# Patient Record
Sex: FEMALE | Race: BLACK OR AFRICAN AMERICAN | Employment: OTHER | URBAN - METROPOLITAN AREA
[De-identification: names, ages, dates, MRNs, and addresses within clinical notes are randomized per-mention and may not be internally consistent; named-entity substitution may affect disease eponyms.]

---

## 2017-01-17 ENCOUNTER — HOSPITAL ENCOUNTER (OUTPATIENT)
Dept: LAB | Age: 72
Discharge: HOME | End: 2017-01-17

## 2017-01-17 ENCOUNTER — LAB ONLY (OUTPATIENT)
Dept: INTERNAL MEDICINE CLINIC | Age: 72
End: 2017-01-17

## 2017-01-17 PROCEDURE — 99001 SPECIMEN HANDLING PT-LAB: CPT | Performed by: FAMILY MEDICINE

## 2017-01-25 ENCOUNTER — OFFICE VISIT (OUTPATIENT)
Dept: INTERNAL MEDICINE CLINIC | Age: 72
End: 2017-01-25

## 2017-01-25 VITALS
DIASTOLIC BLOOD PRESSURE: 81 MMHG | OXYGEN SATURATION: 98 % | TEMPERATURE: 96.7 F | BODY MASS INDEX: 25.94 KG/M2 | RESPIRATION RATE: 16 BRPM | WEIGHT: 146.4 LBS | HEART RATE: 63 BPM | HEIGHT: 63 IN | SYSTOLIC BLOOD PRESSURE: 124 MMHG

## 2017-01-25 DIAGNOSIS — E55.9 VITAMIN D DEFICIENCY: ICD-10-CM

## 2017-01-25 DIAGNOSIS — E66.3 OVERWEIGHT (BMI 25.0-29.9): ICD-10-CM

## 2017-01-25 DIAGNOSIS — E78.00 PURE HYPERCHOLESTEROLEMIA: ICD-10-CM

## 2017-01-25 DIAGNOSIS — K21.9 GASTROESOPHAGEAL REFLUX DISEASE WITHOUT ESOPHAGITIS: ICD-10-CM

## 2017-01-25 DIAGNOSIS — I10 ESSENTIAL HYPERTENSION, BENIGN: Primary | ICD-10-CM

## 2017-01-25 PROBLEM — Z90.710 ACQUIRED ABSENCE OF BOTH CERVIX AND UTERUS: Status: ACTIVE | Noted: 2017-01-25

## 2017-01-25 RX ORDER — HYDROCHLOROTHIAZIDE 25 MG/1
12.5 TABLET ORAL DAILY
Qty: 90 TAB | Refills: 1 | Status: SHIPPED | OUTPATIENT
Start: 2017-01-25 | End: 2017-04-26 | Stop reason: DRUGHIGH

## 2017-01-25 NOTE — MR AVS SNAPSHOT
Visit Information Date & Time Provider Department Dept. Phone Encounter #  
 1/25/2017 10:45 AM Willie Ormond, DO [facility] [employer] [phone] Follow-up Instructions Return for 3 month follow up w/ labs. Upcoming Health Maintenance Date Due DTaP/Tdap/Td series (1 - Tdap) 9/12/1968 Pneumococcal 65+ Low/Medium Risk (1 of 2 - PCV13) 9/12/2012 GLAUCOMA SCREENING Q2Y 3/3/2016 MEDICARE YEARLY EXAM 4/26/2017 BREAST CANCER SCRN MAMMOGRAM 5/20/2018 COLONOSCOPY 5/1/2025 Allergies as of 1/25/2017  Review Complete On: 1/25/2017 By: Alanna Chaves LPN Severity Noted Reaction Type Reactions Naproxen  04/06/2010    Palpitations Current Immunizations  Reviewed on 6/26/2014 No immunizations on file. Not reviewed this visit You Were Diagnosed With   
  
 Codes Comments Essential hypertension, benign    -  Primary ICD-10-CM: I10 
ICD-9-CM: 401.1 Gastroesophageal reflux disease without esophagitis     ICD-10-CM: K21.9 ICD-9-CM: 530.81 Overweight (BMI 25.0-29. 9)     ICD-10-CM: X98.6 ICD-9-CM: 278.02 Vitamin D deficiency     ICD-10-CM: E55.9 ICD-9-CM: 268.9 Pure hypercholesterolemia     ICD-10-CM: E78.00 ICD-9-CM: 272.0 Vitals BP Pulse Temp Resp Height(growth percentile) Weight(growth percentile) 124/81 63 96.7 °F (35.9 °C) (Oral) 16 5' 3\" (1.6 m) 146 lb 6.4 oz (66.4 kg) SpO2 BMI OB Status Smoking Status 98% 25.93 kg/m2 Hysterectomy Former Smoker Vitals History BMI and BSA Data Body Mass Index Body Surface Area  
 25.93 kg/m 2 1.72 m 2 Preferred Pharmacy Pharmacy Name Phone [facility] [address] Your Updated Medication List  
  
   
This list is accurate as of: 1/25/17 12:04 PM.  Always use your most recent med list.  
  
  
  
  
 aspirin delayed-release 81 mg tablet Take 81 mg by mouth every other day. atenolol 100 mg tablet Commonly known as:  TENORMIN Take 1 Tab by mouth daily. FISH OIL 1,000 mg Cap Generic drug:  omega-3 fatty acids-vitamin e Take 1 Cap by mouth daily. hydroCHLOROthiazide 25 mg tablet Commonly known as:  HYDRODIURIL Take 0.5 Tabs by mouth daily. PriLOSEC 20 mg capsule Generic drug:  omeprazole Take 20 mg by mouth daily. simvastatin 20 mg tablet Commonly known as:  ZOCOR  
TAKE 1 TABLET  ONE  TIME EVERY NIGHT  
  
 VITAMIN D 2,000 unit Cap capsule Generic drug:  Cholecalciferol (Vitamin D3) Take  by mouth daily. Prescriptions Sent to Pharmacy Refills  
 hydroCHLOROthiazide (HYDRODIURIL) 25 mg tablet 1 Sig: Take 0.5 Tabs by mouth daily. Class: Normal  
 Pharmacy: [id number] #: [phone] Route: Oral  
  
Follow-up Instructions Return for 3 month follow up w/ labs. Patient Instructions Learning About High Blood Pressure What is high blood pressure? Blood pressure is a measure of how hard the blood pushes against the walls of your arteries. It's normal for blood pressure to go up and down throughout the day, but if it stays up, you have high blood pressure. Another name for high blood pressure is hypertension. Two numbers tell you your blood pressure. The first number is the systolic pressure. It shows how hard the blood pushes when your heart is pumping. The second number is the diastolic pressure. It shows how hard the blood pushes between heartbeats, when your heart is relaxed and filling with blood. A blood pressure of less than 120/80 (say \"120 over 80\") is ideal for an adult. High blood pressure is 140/90 or higher. You have high blood pressure if your top number is 140 or higher or your bottom number is 90 or higher, or both.  Many people fall into the category in between, called prehypertension. People with prehypertension need to make lifestyle changes to bring their blood pressure down and help prevent or delay high blood pressure. What happens when you have high blood pressure? · Blood flows through your arteries with too much force. Over time, this damages the walls of your arteries. But you can't feel it. High blood pressure usually doesn't cause symptoms. · Fat and calcium start to build up in your arteries. This buildup is called plaque. Plaque makes your arteries narrower and stiffer. Blood can't flow through them as easily. · This lack of good blood flow starts to damage some of the organs in your body. This can lead to problems such as coronary artery disease and heart attack, heart failure, stroke, kidney failure, and eye damage. How can you prevent high blood pressure? · Stay at a healthy weight. · Try to limit how much sodium you eat to less than 2,300 milligrams (mg) a day. If you limit your sodium to 1,500 mg a day, you can lower your blood pressure even more. ¨ Buy foods that are labeled \"unsalted,\" \"sodium-free,\" or \"low-sodium. \" Foods labeled \"reduced-sodium\" and \"light sodium\" may still have too much sodium. ¨ Flavor your food with garlic, lemon juice, onion, vinegar, herbs, and spices instead of salt. Do not use soy sauce, steak sauce, onion salt, garlic salt, mustard, or ketchup on your food. ¨ Use less salt (or none) when recipes call for it. You can often use half the salt a recipe calls for without losing flavor. · Be physically active. Get at least 30 minutes of exercise on most days of the week. Walking is a good choice. You also may want to do other activities, such as running, swimming, cycling, or playing tennis or team sports. · Limit alcohol to 2 drinks a day for men and 1 drink a day for women. · Eat plenty of fruits, vegetables, and low-fat dairy products. Eat less saturated and total fats. How is high blood pressure treated? · Your doctor will suggest making lifestyle changes. For example, your doctor may ask you to eat healthy foods, quit smoking, lose extra weight, and be more active. · If lifestyle changes don't help enough or your blood pressure is very high, you will have to take medicine every day. Follow-up care is a key part of your treatment and safety. Be sure to make and go to all appointments, and call your doctor if you are having problems. It's also a good idea to know your test results and keep a list of the medicines you take. Where can you learn more? Go to http://rad-batool.info/. Enter P501 in the search box to learn more about \"Learning About High Blood Pressure. \" Current as of: March 23, 2016 Content Version: 11.1 © 5147-2133 [employer]. Care instructions adapted under license by [employer] (which disclaims liability or warranty for this information). If you have questions about a medical condition or this instruction, always ask your healthcare professional. [place] [address] any warranty or liability for your use of this information. Introducing [facility]! Dear Ren Craft: Thank you for requesting a Ataxion account. Our records indicate that you already have an active Ataxion account. You can access your account anytime at [email or web address] Did you know that you can access your hospital and ER discharge instructions at any time in Ataxion? You can also review all of your test results from your hospital stay or ER visit. Additional Information If you have questions, please visit the Frequently Asked Questions section of the Ataxion website at [email or web address] Remember, Ataxion is NOT to be used for urgent needs. For medical emergencies, dial 911. Now available from your iPhone and Android! Please provide this summary of care documentation to your next provider. Your primary care clinician is listed as Glez Diss. If you have any questions after today's visit, please call [phone].

## 2017-01-25 NOTE — PROGRESS NOTES
Pt is here for 3 month follow up. HTN, Gerd, Chol, Vit D Def, Labs done    Do you have an advance directive yes  Request Pt bring a copy of advance directive for scanning. Elza Zaragoza Pt aware to bring copy of ACD to be scanned in.       1. Have you been to the ER, urgent care clinic since your last visit? Hospitalized since your last visit? No    2. Have you seen or consulted any other health care providers outside of the [address] [place] [address] since your last visit? Include any pap smears or colon screening.  No

## 2017-01-25 NOTE — ACP (ADVANCE CARE PLANNING)
Pt has ACP but has not brought copy in to be scanned into her chart. Pt has been reminded previously byt has forgotten.  States she will mail the ACP for us to have scanned & then we can mail back her orginial.

## 2017-01-25 NOTE — PATIENT INSTRUCTIONS
Learning About High Blood Pressure  What is high blood pressure? Blood pressure is a measure of how hard the blood pushes against the walls of your arteries. It's normal for blood pressure to go up and down throughout the day, but if it stays up, you have high blood pressure. Another name for high blood pressure is hypertension. Two numbers tell you your blood pressure. The first number is the systolic pressure. It shows how hard the blood pushes when your heart is pumping. The second number is the diastolic pressure. It shows how hard the blood pushes between heartbeats, when your heart is relaxed and filling with blood. A blood pressure of less than 120/80 (say \"120 over 80\") is ideal for an adult. High blood pressure is 140/90 or higher. You have high blood pressure if your top number is 140 or higher or your bottom number is 90 or higher, or both. Many people fall into the category in between, called prehypertension. People with prehypertension need to make lifestyle changes to bring their blood pressure down and help prevent or delay high blood pressure. What happens when you have high blood pressure? · Blood flows through your arteries with too much force. Over time, this damages the walls of your arteries. But you can't feel it. High blood pressure usually doesn't cause symptoms. · Fat and calcium start to build up in your arteries. This buildup is called plaque. Plaque makes your arteries narrower and stiffer. Blood can't flow through them as easily. · This lack of good blood flow starts to damage some of the organs in your body. This can lead to problems such as coronary artery disease and heart attack, heart failure, stroke, kidney failure, and eye damage. How can you prevent high blood pressure? · Stay at a healthy weight. · Try to limit how much sodium you eat to less than 2,300 milligrams (mg) a day.  If you limit your sodium to 1,500 mg a day, you can lower your blood pressure even more.  ¨ Buy foods that are labeled \"unsalted,\" \"sodium-free,\" or \"low-sodium. \" Foods labeled \"reduced-sodium\" and \"light sodium\" may still have too much sodium. ¨ Flavor your food with garlic, lemon juice, onion, vinegar, herbs, and spices instead of salt. Do not use soy sauce, steak sauce, onion salt, garlic salt, mustard, or ketchup on your food. ¨ Use less salt (or none) when recipes call for it. You can often use half the salt a recipe calls for without losing flavor. · Be physically active. Get at least 30 minutes of exercise on most days of the week. Walking is a good choice. You also may want to do other activities, such as running, swimming, cycling, or playing tennis or team sports. · Limit alcohol to 2 drinks a day for men and 1 drink a day for women. · Eat plenty of fruits, vegetables, and low-fat dairy products. Eat less saturated and total fats. How is high blood pressure treated? · Your doctor will suggest making lifestyle changes. For example, your doctor may ask you to eat healthy foods, quit smoking, lose extra weight, and be more active. · If lifestyle changes don't help enough or your blood pressure is very high, you will have to take medicine every day. Follow-up care is a key part of your treatment and safety. Be sure to make and go to all appointments, and call your doctor if you are having problems. It's also a good idea to know your test results and keep a list of the medicines you take. Where can you learn more? Go to http://rad-batool.info/. Enter P501 in the search box to learn more about \"Learning About High Blood Pressure. \"  Current as of: March 23, 2016  Content Version: 11.1  © 6737-4183 [employer]. Care instructions adapted under license by [employer] (which disclaims liability or warranty for this information).  If you have questions about a medical condition or this instruction, always ask your healthcare professional. [employer] disclaims any warranty or liability for your use of this information.

## 2017-01-31 NOTE — PROGRESS NOTES
HISTORY OF PRESENT ILLNESS  Huber Clark is a 73 y.o. female. HPI Comments: 79-year-old female in today for three-month followup for hypertension, GERD, hypercholesterolemia and vitamin D deficiency. The patient declined the Tdap and pneumococcal vaccine. Patient says she walks 1.5 miles daily for the past few months. She has not been following a low salt diet, she says she has been indulging in fried chicken. 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 She is taking her medications with no adverse side effects, she is requesting refills today. She denies CP, SOB, dyspnea, edema, N/T or myalgias. Allergies   Allergen Reactions    Naproxen Palpitations       Past Medical History   Diagnosis Date    Fibrocystic disease of breast     GERD (gastroesophageal reflux disease)     Hx of dizziness     Hypercholesterolemia     Hypertension     PND (paroxysmal nocturnal dyspnea)      Evaluated by Dr. Stephanie Park (March 2012)    Vaginitis atrophic 7-29-09    Vitamin D deficiency 4-2009       Family History   Problem Relation Age of Onset    Hypertension Mother     Arthritis-osteo Mother     Heart Attack Father     Hypertension Sister     High Cholesterol Sister     Hypertension Brother        Social History   Substance Use Topics    Smoking status: Former Smoker     Packs/day: 0.50     Years: 15.00     Quit date: 12/13/1979    Smokeless tobacco: Never Used    Alcohol use No        Current Outpatient Prescriptions   Medication Sig    hydroCHLOROthiazide (HYDRODIURIL) 25 mg tablet Take 0.5 Tabs by mouth daily.  atenolol (TENORMIN) 100 mg tablet Take 1 Tab by mouth daily.  simvastatin (ZOCOR) 20 mg tablet TAKE 1 TABLET  ONE  TIME EVERY NIGHT    omeprazole (PRILOSEC) 20 mg capsule Take 20 mg by mouth daily.     aspirin delayed-release 81 mg tablet Take 81 mg by mouth every other day.  omega-3 fatty acids-vitamin e (FISH OIL) 1,000 mg Cap Take 1 Cap by mouth daily.  Cholecalciferol, Vitamin D3, (VITAMIN D) 2,000 unit Cap Take  by mouth daily. No current facility-administered medications for this visit. Past Surgical History   Procedure Laterality Date    Pr colonoscopy flx dx w/collj spec when pfrmd  2004     normal ; Dr. Charol Bence Hx hysterectomy  1626'U       ROSConstitutional: Negative for fever and chills. HENT: Negative for tinnitus. Eyes: Negative for blurred vision and double vision. Respiratory: Negative for cough and shortness of breath. Cardiovascular: Negative for chest pain and leg swelling. Gastrointestinal: Negative for nausea, vomiting and diarrhea. Genitourinary: Negative for dysuria and flank pain. Musculoskeletal: Negative for myalgias and joint pain. Neurological: Negative for dizziness, tremors, sensory change, speech change, focal weakness and headaches. Psychiatric/Behavioral: Negative for depression and suicidal ideas. Visit Vitals    /81    Pulse 63    Temp 96.7 °F (35.9 °C) (Oral)    Resp 16    Ht 5' 3\" (1.6 m)    Wt 146 lb 6.4 oz (66.4 kg)    SpO2 98%    BMI 25.93 kg/m2     Physical Exam  Constitutional: She is oriented to person, place, and time and well-developed, well-nourished, and in no distress. Head: Normocephalic and atraumatic. Right Ear: External ear normal.   Left Ear: External ear normal.   Eyes: Pupils are equal, round, and reactive to light. Neck: Normal range of motion. Cardiovascular: Normal rate, regular rhythm, normal heart sounds and intact distal pulses. No murmur heard. Pulmonary/Chest: Effort normal and breath sounds normal. No respiratory distress. Musculoskeletal: Normal range of motion. She exhibits no edema. Neurological: She is alert and oriented to person, place, and time. Gait normal.   Skin: Skin is warm and dry.    Psychiatric: Mood, memory, affect and judgment normal.     Lab Results   Component Value Date/Time    WBC 7.8 01/17/2017 08:08 AM    HGB 12.3 01/17/2017 08:08 AM    HCT 38.6 01/17/2017 08:08 AM    PLATELET 072 28/09/2505 08:08 AM    MCV 84 01/17/2017 08:08 AM     Lab Results   Component Value Date/Time    Sodium 140 01/17/2017 08:08 AM    Potassium 3.5 01/17/2017 08:08 AM    Chloride 96 01/17/2017 08:08 AM    CO2 26 01/17/2017 08:08 AM    Anion gap 6 04/07/2016 06:00 PM    Glucose 71 01/17/2017 08:08 AM    BUN 13 01/17/2017 08:08 AM    Creatinine 0.82 01/17/2017 08:08 AM    BUN/Creatinine ratio 16 01/17/2017 08:08 AM    GFR est AA 84 01/17/2017 08:08 AM    GFR est non-AA 73 01/17/2017 08:08 AM    Calcium 9.1 01/17/2017 08:08 AM    Bilirubin, total 0.3 01/17/2017 08:08 AM    ALT 9 01/17/2017 08:08 AM    AST 17 01/17/2017 08:08 AM    Alk. phosphatase 42 01/17/2017 08:08 AM    Protein, total 6.4 01/17/2017 08:08 AM    Albumin 3.9 01/17/2017 08:08 AM    Globulin 3.5 03/11/2010 08:44 AM    A-G Ratio 1.6 01/17/2017 08:08 AM     Lab Results   Component Value Date/Time    Cholesterol, total 200 01/17/2017 08:08 AM    HDL Cholesterol 55 01/17/2017 08:08 AM    LDL, calculated 132 01/17/2017 08:08 AM    VLDL, calculated 13 01/17/2017 08:08 AM    Triglyceride 64 01/17/2017 08:08 AM    CHOL/HDL Ratio 3.0 08/04/2010 06:07 PM     Lab Results   Component Value Date/Time    TSH 1.110 01/17/2017 08:08 AM    T4, Free 1.14 06/19/2014 08:11 AM     Lab Results   Component Value Date/Time    Vitamin D 25-Hydroxy 62.1 09/06/2011 09:03 AM    VITAMIN D, 25-HYDROXY 61.6 01/17/2017 08:08 AM                        ASSESSMENT and PLAN    ICD-10-CM ICD-9-CM    1. Essential hypertension, benign I10 401.1 hydroCHLOROthiazide (HYDRODIURIL) 25 mg tablet      CBC WITH AUTOMATED DIFF      METABOLIC PANEL, COMPREHENSIVE      TSH 3RD GENERATION      MICROALBUMIN, UR, RAND W/ MICROALBUMIN/CREA RATIO      URINALYSIS W/MICROSCOPIC   2.  Gastroesophageal reflux disease without esophagitis K21.9 530.81 CBC WITH AUTOMATED DIFF      METABOLIC PANEL, COMPREHENSIVE   3. Overweight (BMI 25.0-29. 9) E66.3 278.02    4. Vitamin D deficiency E55.9 268.9 VITAMIN D, 25 HYDROXY   5. Pure hypercholesterolemia E78.00 272.0 LIPID PANEL     -Labs reviewed with patient in detail.  -Ctn current therapeutic treatment regimen. No medication changes currently. -RTC prn for well woman exam.    -RTC 3 months w/ labs    -Patient classified as overweight  -Advised continued exercise and dietary modifications.    -Patient agrees with assessment and plan    According to the CDC an increased BMI can lead to \"all-causes of death (mortality), High blood pressure (Hypertension), High LDL cholesterol, low HDL cholesterol, or high levels of triglycerides (Dyslipidemia), Type 2 diabetes, Coronary heart disease, Stroke, Gallbladder disease, Osteoarthritis (a breakdown of cartilage and bone within a joint), Sleep apnea and breathing problems, Chronic inflammation and increased oxidative stress, some cancers (endometrial, breast, colon, kidney, gallbladder, and liver), Low quality of life, Mental illness such as clinical depression, anxiety, and other mental disorders and Body pain and difficulty with physical functioning. \"    BMI Weight Status   Below 18.5 Underweight   18.5  24.9 Normal or Healthy Weight   25.0  29.9 Overweight   30.0 and Above Obese   40.0 and Above Morbid Obesity     Additional Instructions: The patient understands that they should contact the office at any time if any questions or concerns develop. They are also aware that they can call our main office number at [phone] at any time if they would like to address any concerns with the physician. They also understand that they should dial 911 if any acute emergency arises. The patient understands that they should give us a minimum of 48 hours to complete prescription refills once they are requested.   The patient has also been instructed to contact us by calling the main office number if they have not received feedback within 2 weeks of having any tests completed. The patient is a aware that they should read all package insert information when picking up the medications and that they should consult the pharmacist of a physician if they have any questions or concerns regarding the prescribed medications. Discussed with the patient new medications given and patient instructed to read pharmacy literature regarding side effects and drug interactions. Instructions for taking the medications were provided to the patient and the consequences of not taking it. Follow-up Disposition:   Return if symptoms worsen or fail to improve. Risk and benefits of new medication discussed in detail, patient was given the opportunity to ask questions  AVS provided  reviewed diet, exercise and weight control   Alarm signals discussed. ER precautions   Plan of care reviewed with patient. Understanding verbalized and they are in agreement with plan of care. Please note that this document was created with voice recognition software.     Anton Levy DO

## 2017-04-17 ENCOUNTER — LAB ONLY (OUTPATIENT)
Dept: INTERNAL MEDICINE CLINIC | Age: 72
End: 2017-04-17

## 2017-04-17 ENCOUNTER — HOSPITAL ENCOUNTER (OUTPATIENT)
Dept: LAB | Age: 72
Discharge: HOME | End: 2017-04-17

## 2017-04-17 PROCEDURE — 99001 SPECIMEN HANDLING PT-LAB: CPT | Performed by: FAMILY MEDICINE

## 2017-04-18 LAB
25(OH)D3+25(OH)D2 SERPL-MCNC: 78.6 NG/ML (ref 30–100)
ALBUMIN SERPL-MCNC: 4.1 G/DL (ref 3.6–4.8)
ALBUMIN/CREAT UR: <2.5 MG/G CREAT (ref 0–30)
ALBUMIN/GLOB SERPL: 1.6 {RATIO} (ref 1.2–2.2)
ALP SERPL-CCNC: 40 IU/L (ref 39–117)
ALT SERPL-CCNC: 10 IU/L (ref 0–32)
APPEARANCE UR: CLEAR
AST SERPL-CCNC: 13 IU/L (ref 0–40)
BASOPHILS # BLD AUTO: 0 X10E3/UL (ref 0–0.2)
BASOPHILS NFR BLD AUTO: 0 %
BILIRUB SERPL-MCNC: 0.3 MG/DL (ref 0–1.2)
BILIRUB UR QL STRIP: NEGATIVE
BUN SERPL-MCNC: 21 MG/DL (ref 8–27)
BUN/CREAT SERPL: 21 (ref 12–28)
CALCIUM SERPL-MCNC: 9.1 MG/DL (ref 8.7–10.3)
CHLORIDE SERPL-SCNC: 102 MMOL/L (ref 96–106)
CHOLEST SERPL-MCNC: 181 MG/DL (ref 100–199)
CO2 SERPL-SCNC: 25 MMOL/L (ref 18–29)
COLOR UR: YELLOW
CREAT SERPL-MCNC: 1.01 MG/DL (ref 0.57–1)
CREAT UR-MCNC: 120.6 MG/DL
EOSINOPHIL # BLD AUTO: 0.1 X10E3/UL (ref 0–0.4)
EOSINOPHIL NFR BLD AUTO: 2 %
ERYTHROCYTE [DISTWIDTH] IN BLOOD BY AUTOMATED COUNT: 14.3 % (ref 12.3–15.4)
GLOBULIN SER CALC-MCNC: 2.5 G/DL (ref 1.5–4.5)
GLUCOSE SERPL-MCNC: 80 MG/DL (ref 65–99)
GLUCOSE UR QL: NEGATIVE
HCT VFR BLD AUTO: 39.9 % (ref 34–46.6)
HDLC SERPL-MCNC: 60 MG/DL
HGB BLD-MCNC: 12.6 G/DL (ref 11.1–15.9)
HGB UR QL STRIP: NEGATIVE
IMM GRANULOCYTES # BLD: 0 X10E3/UL (ref 0–0.1)
IMM GRANULOCYTES NFR BLD: 0 %
INTERPRETATION, 910389: NORMAL
INTERPRETATION: NORMAL
KETONES UR QL STRIP: NEGATIVE
LDLC SERPL CALC-MCNC: 114 MG/DL (ref 0–99)
LEUKOCYTE ESTERASE UR QL STRIP: NEGATIVE
LYMPHOCYTES # BLD AUTO: 2 X10E3/UL (ref 0.7–3.1)
LYMPHOCYTES NFR BLD AUTO: 29 %
MCH RBC QN AUTO: 25.9 PG (ref 26.6–33)
MCHC RBC AUTO-ENTMCNC: 31.6 G/DL (ref 31.5–35.7)
MCV RBC AUTO: 82 FL (ref 79–97)
MICRO URNS: NORMAL
MICROALBUMIN UR-MCNC: <3 UG/ML
MONOCYTES # BLD AUTO: 0.5 X10E3/UL (ref 0.1–0.9)
MONOCYTES NFR BLD AUTO: 7 %
NEUTROPHILS # BLD AUTO: 4.4 X10E3/UL (ref 1.4–7)
NEUTROPHILS NFR BLD AUTO: 62 %
NITRITE UR QL STRIP: NEGATIVE
PDF IMAGE, 910387: NORMAL
PH UR STRIP: 6 [PH] (ref 5–7.5)
PLATELET # BLD AUTO: 300 X10E3/UL (ref 150–379)
POTASSIUM SERPL-SCNC: 3.5 MMOL/L (ref 3.5–5.2)
PROT SERPL-MCNC: 6.6 G/DL (ref 6–8.5)
PROT UR QL STRIP: NEGATIVE
RBC # BLD AUTO: 4.87 X10E6/UL (ref 3.77–5.28)
SODIUM SERPL-SCNC: 144 MMOL/L (ref 134–144)
SP GR UR: 1.02 (ref 1–1.03)
TRIGL SERPL-MCNC: 36 MG/DL (ref 0–149)
TSH SERPL DL<=0.005 MIU/L-ACNC: 1.4 UIU/ML (ref 0.45–4.5)
UROBILINOGEN UR STRIP-MCNC: 0.2 MG/DL (ref 0.2–1)
VLDLC SERPL CALC-MCNC: 7 MG/DL (ref 5–40)
WBC # BLD AUTO: 7 X10E3/UL (ref 3.4–10.8)

## 2017-04-26 ENCOUNTER — OFFICE VISIT (OUTPATIENT)
Dept: INTERNAL MEDICINE CLINIC | Age: 72
End: 2017-04-26

## 2017-04-26 VITALS
RESPIRATION RATE: 16 BRPM | HEIGHT: 63 IN | OXYGEN SATURATION: 100 % | BODY MASS INDEX: 26.29 KG/M2 | DIASTOLIC BLOOD PRESSURE: 84 MMHG | SYSTOLIC BLOOD PRESSURE: 132 MMHG | TEMPERATURE: 97.8 F | WEIGHT: 148.4 LBS | HEART RATE: 56 BPM

## 2017-04-26 DIAGNOSIS — R06.00 PND (PAROXYSMAL NOCTURNAL DYSPNEA): Primary | Chronic | ICD-10-CM

## 2017-04-26 DIAGNOSIS — I10 ESSENTIAL HYPERTENSION, BENIGN: ICD-10-CM

## 2017-04-26 DIAGNOSIS — E78.00 PURE HYPERCHOLESTEROLEMIA: ICD-10-CM

## 2017-04-26 DIAGNOSIS — E55.9 VITAMIN D DEFICIENCY: ICD-10-CM

## 2017-04-26 DIAGNOSIS — K21.9 GASTROESOPHAGEAL REFLUX DISEASE WITHOUT ESOPHAGITIS: ICD-10-CM

## 2017-04-26 DIAGNOSIS — E66.3 OVERWEIGHT (BMI 25.0-29.9): ICD-10-CM

## 2017-04-26 RX ORDER — HYDROCHLOROTHIAZIDE 12.5 MG/1
12.5 TABLET ORAL DAILY
Qty: 90 TAB | Refills: 1 | Status: SHIPPED | OUTPATIENT
Start: 2017-04-26 | End: 2017-10-06 | Stop reason: SDUPTHER

## 2017-04-26 NOTE — PATIENT INSTRUCTIONS

## 2017-04-26 NOTE — MR AVS SNAPSHOT
Visit Information Date & Time Provider Department Dept. Phone Encounter #  
 4/26/2017  8:45 AM Turner Mendez DO [facility] 8 [id number] Follow-up Instructions Return for 6 month follow up w/ labs. Upcoming Health Maintenance Date Due DTaP/Tdap/Td series (1 - Tdap) 9/12/1968 GLAUCOMA SCREENING Q2Y 3/3/2016 MEDICARE YEARLY EXAM 4/26/2017 Pneumococcal 65+ Low/Medium Risk (2 of 2 - PPSV23) 4/26/2018 BREAST CANCER SCRN MAMMOGRAM 5/20/2018 COLONOSCOPY 5/1/2025 Allergies as of 4/26/2017  Review Complete On: 4/26/2017 By: Turner Mendez DO Severity Noted Reaction Type Reactions Naproxen  04/06/2010    Palpitations Current Immunizations  Reviewed on 6/26/2014 No immunizations on file. Not reviewed this visit You Were Diagnosed With   
  
 Codes Comments PND (paroxysmal nocturnal dyspnea)    -  Primary ICD-10-CM: R06.00 
ICD-9-CM: 786.09 Overweight (BMI 25.0-29. 9)     ICD-10-CM: H07.9 ICD-9-CM: 278.02 Vitamin D deficiency     ICD-10-CM: E55.9 ICD-9-CM: 268.9 Essential hypertension, benign     ICD-10-CM: I10 
ICD-9-CM: 401.1 Pure hypercholesterolemia     ICD-10-CM: E78.00 ICD-9-CM: 272.0 Gastroesophageal reflux disease without esophagitis     ICD-10-CM: K21.9 ICD-9-CM: 530.81 Vitals BP Pulse Temp Resp Height(growth percentile) Weight(growth percentile) 132/84 (!) 56 97.8 °F (36.6 °C) (Oral) 16 5' 3\" (1.6 m) 148 lb 6.4 oz (67.3 kg) SpO2 BMI OB Status Smoking Status 100% 26.29 kg/m2 Hysterectomy Former Smoker Vitals History BMI and BSA Data Body Mass Index Body Surface Area  
 26.29 kg/m 2 1.73 m 2 Preferred Pharmacy Pharmacy Name Phone [facility] [address] [place], [address] Your Updated Medication List  
  
   
This list is accurate as of: 4/26/17  9:53 AM.  Always use your most recent med list.  
  
  
  
  
 aspirin delayed-release 81 mg tablet Take 81 mg by mouth every other day. atenolol 100 mg tablet Commonly known as:  TENORMIN  
TAKE ONE TABLET BY MOUTH ONCE DAILY FISH OIL 1,000 mg Cap Generic drug:  omega-3 fatty acids-vitamin e Take 1 Cap by mouth daily. hydroCHLOROthiazide 12.5 mg tablet Commonly known as:  HYDRODIURIL Take 1 Tab by mouth daily. PriLOSEC 20 mg capsule Generic drug:  omeprazole Take 20 mg by mouth daily. simvastatin 20 mg tablet Commonly known as:  ZOCOR  
TAKE 1 TABLET  ONE  TIME EVERY NIGHT  
  
 VITAMIN D 2,000 unit Cap capsule Generic drug:  Cholecalciferol (Vitamin D3) Take  by mouth daily. Prescriptions Sent to Pharmacy Refills  
 hydroCHLOROthiazide (HYDRODIURIL) 12.5 mg tablet 1 Sig: Take 1 Tab by mouth daily. Class: Normal  
 Pharmacy: [id number] [address] [place] [address] #: [phone] Route: Oral  
  
Follow-up Instructions Return for 6 month follow up w/ labs. Patient Instructions DASH Diet: Care Instructions Your Care Instructions The DASH diet is an eating plan that can help lower your blood pressure. DASH stands for Dietary Approaches to Stop Hypertension. Hypertension is high blood pressure. The DASH diet focuses on eating foods that are high in calcium, potassium, and magnesium. These nutrients can lower blood pressure. The foods that are highest in these nutrients are fruits, vegetables, low-fat dairy products, nuts, seeds, and legumes. But taking calcium, potassium, and magnesium supplements instead of eating foods that are high in those nutrients does not have the same effect. The DASH diet also includes whole grains, fish, and poultry. The DASH diet is one of several lifestyle changes your doctor may recommend to lower your high blood pressure.  Your doctor may also want you to decrease the amount of sodium in your diet. Lowering sodium while following the DASH diet can lower blood pressure even further than just the DASH diet alone. Follow-up care is a key part of your treatment and safety. Be sure to make and go to all appointments, and call your doctor if you are having problems. It's also a good idea to know your test results and keep a list of the medicines you take. How can you care for yourself at home? Following the DASH diet · Eat 4 to 5 servings of fruit each day. A serving is 1 medium-sized piece of fruit, ½ cup chopped or canned fruit, 1/4 cup dried fruit, or 4 ounces (½ cup) of fruit juice. Choose fruit more often than fruit juice. · Eat 4 to 5 servings of vegetables each day. A serving is 1 cup of lettuce or raw leafy vegetables, ½ cup of chopped or cooked vegetables, or 4 ounces (½ cup) of vegetable juice. Choose vegetables more often than vegetable juice. · Get 2 to 3 servings of low-fat and fat-free dairy each day. A serving is 8 ounces of milk, 1 cup of yogurt, or 1 ½ ounces of cheese. · Eat 6 to 8 servings of grains each day. A serving is 1 slice of bread, 1 ounce of dry cereal, or ½ cup of cooked rice, pasta, or cooked cereal. Try to choose whole-grain products as much as possible. · Limit lean meat, poultry, and fish to 2 servings each day. A serving is 3 ounces, about the size of a deck of cards. · Eat 4 to 5 servings of nuts, seeds, and legumes (cooked dried beans, lentils, and split peas) each week. A serving is 1/3 cup of nuts, 2 tablespoons of seeds, or ½ cup of cooked beans or peas. · Limit fats and oils to 2 to 3 servings each day. A serving is 1 teaspoon of vegetable oil or 2 tablespoons of salad dressing. · Limit sweets and added sugars to 5 servings or less a week. A serving is 1 tablespoon jelly or jam, ½ cup sorbet, or 1 cup of lemonade. · Eat less than 2,300 milligrams (mg) of sodium a day.  If you limit your sodium to 1,500 mg a day, you can lower your blood pressure even more. Tips for success · Start small. Do not try to make dramatic changes to your diet all at once. You might feel that you are missing out on your favorite foods and then be more likely to not follow the plan. Make small changes, and stick with them. Once those changes become habit, add a few more changes. · Try some of the following: ¨ Make it a goal to eat a fruit or vegetable at every meal and at snacks. This will make it easy to get the recommended amount of fruits and vegetables each day. ¨ Try yogurt topped with fruit and nuts for a snack or healthy dessert. ¨ Add lettuce, tomato, cucumber, and onion to sandwiches. ¨ Combine a ready-made pizza crust with low-fat mozzarella cheese and lots of vegetable toppings. Try using tomatoes, squash, spinach, broccoli, carrots, cauliflower, and onions. ¨ Have a variety of cut-up vegetables with a low-fat dip as an appetizer instead of chips and dip. ¨ Sprinkle sunflower seeds or chopped almonds over salads. Or try adding chopped walnuts or almonds to cooked vegetables. ¨ Try some vegetarian meals using beans and peas. Add garbanzo or kidney beans to salads. Make burritos and tacos with mashed machuca beans or black beans. Where can you learn more? Go to http://rad-batool.info/. Enter R683 in the search box to learn more about \"DASH Diet: Care Instructions. \" Current as of: March 23, 2016 Content Version: 11.2 © 0314-9370 [employer]. Care instructions adapted under license by [employer] (which disclaims liability or warranty for this information). If you have questions about a medical condition or this instruction, always ask your healthcare professional. Radha Black any warranty or liability for your use of this information. Introducing [facility]! Dear Haseeb Minus: Thank you for requesting a [employer] account. Our records indicate that you already have an active [employer] account. You can access your account anytime at [email or web address] Did you know that you can access your hospital and ER discharge instructions at any time in [employer]? You can also review all of your test results from your hospital stay or ER visit. Additional Information If you have questions, please visit the Frequently Asked Questions section of the [employer] website at [email or web address] Remember, [employer] is NOT to be used for urgent needs. For medical emergencies, dial 911. Now available from your iPhone and Android! Please provide this summary of care documentation to your next provider. Your primary care clinician is listed as Mikael Martínez. If you have any questions after today's visit, please call [phone].

## 2017-04-26 NOTE — PROGRESS NOTES
Pt is here for 3 month follow up. HTN, Gerd, Cholesterol, Vit D Def   Labs done    Do you have an advance directive yes  Request Pt bring a copy of advance directive for scanning. 1. Have you been to the ER, urgent care clinic since your last visit? Hospitalized since your last visit? No    2. Have you seen or consulted any other health care providers outside of the [address] [place] [address] since your last visit? Include any pap smears or colon screening. No      Pt states she is scheduled for an eye exam on 5/10/17.

## 2017-04-30 NOTE — PROGRESS NOTES
HISTORY OF PRESENT ILLNESS  Bruce Mathew is a 73 y.o. female. HPI Comments: 72-year-old female in today for three-month followup for hypertension, GERD, hypercholesterolemia and vitamin D deficiency. The patient declined the Tdap and pneumococcal vaccine. Optho appointment 5/10/17    Patient says she walks 1.5 miles daily for the past few months. She has not been following a low salt diet, she says she has been indulging in fried chicken. She has had some home stressors    She has no concerns and reports that she is doing well. She is taking her medications with no adverse side effects, she is requesting refills today. She denies CP, SOB, dyspnea, edema, N/T or myalgias. Allergies   Allergen Reactions    Naproxen Palpitations       Past Medical History:   Diagnosis Date    Fibrocystic disease of breast     GERD (gastroesophageal reflux disease)     Hx of dizziness     Hypercholesterolemia     Hypertension     PND (paroxysmal nocturnal dyspnea)     Evaluated by Dr. Anushka Clemens (March 2012)    Vaginitis atrophic 7-29-09    Vitamin D deficiency 4-2009       Family History   Problem Relation Age of Onset    Hypertension Mother     Arthritis-osteo Mother     Heart Attack Father     Hypertension Sister     High Cholesterol Sister     Hypertension Brother        Social History   Substance Use Topics    Smoking status: Former Smoker     Packs/day: 0.50     Years: 15.00     Quit date: 12/13/1979    Smokeless tobacco: Never Used    Alcohol use No        Current Outpatient Prescriptions   Medication Sig    hydroCHLOROthiazide (HYDRODIURIL) 12.5 mg tablet Take 1 Tab by mouth daily.  atenolol (TENORMIN) 100 mg tablet TAKE ONE TABLET BY MOUTH ONCE DAILY    simvastatin (ZOCOR) 20 mg tablet TAKE 1 TABLET  ONE  TIME EVERY NIGHT    omeprazole (PRILOSEC) 20 mg capsule Take 20 mg by mouth daily.  aspirin delayed-release 81 mg tablet Take 81 mg by mouth every other day.     omega-3 fatty acids-vitamin e (FISH OIL) 1,000 mg Cap Take 1 Cap by mouth daily.  Cholecalciferol, Vitamin D3, (VITAMIN D) 2,000 unit Cap Take  by mouth daily. No current facility-administered medications for this visit. Past Surgical History:   Procedure Laterality Date    HX HYSTERECTOMY  1960's    [place] COLONOSCOPY FLX DX W/COLLJ SPEC WHEN PFRMD  2004    normal ; Dr. Jose Merino: Negative for fever and chills. HENT: Negative for tinnitus. Eyes: Negative for blurred vision and double vision. Respiratory: Negative for cough and shortness of breath. Cardiovascular: Negative for chest pain and leg swelling. Gastrointestinal: Negative for nausea, vomiting and diarrhea. Genitourinary: Negative for dysuria and flank pain. Musculoskeletal: Negative for myalgias and joint pain. Neurological: Negative for dizziness, tremors, sensory change, speech change, focal weakness and headaches. Psychiatric/Behavioral: Negative for depression and suicidal ideas. Visit Vitals    /84    Pulse (!) 56    Temp 97.8 °F (36.6 °C) (Oral)    Resp 16    Ht 5' 3\" (1.6 m)    Wt 148 lb 6.4 oz (67.3 kg)    SpO2 100%    BMI 26.29 kg/m2     Physical Exam  Constitutional: She is oriented to person, place, and time and well-developed, well-nourished, and in no distress. Head: Normocephalic and atraumatic. Right Ear: External ear normal.   Left Ear: External ear normal.   Eyes: Pupils are equal, round, and reactive to light. Neck: Normal range of motion. Cardiovascular: Normal rate, regular rhythm, normal heart sounds and intact distal pulses. No murmur heard. Pulmonary/Chest: Effort normal and breath sounds normal. No respiratory distress. Musculoskeletal: Normal range of motion. She exhibits no edema. Neurological: She is alert and oriented to person, place, and time. Gait normal.   Skin: Skin is warm and dry.    Psychiatric: Mood, memory, affect and judgment normal.     Lab Results   Component Value Date/Time    WBC 7.0 04/17/2017 08:14 AM    HGB 12.6 04/17/2017 08:14 AM    HCT 39.9 04/17/2017 08:14 AM    PLATELET 418 50/80/5372 08:14 AM    MCV 82 04/17/2017 08:14 AM     Lab Results   Component Value Date/Time    Sodium 144 04/17/2017 08:14 AM    Potassium 3.5 04/17/2017 08:14 AM    Chloride 102 04/17/2017 08:14 AM    CO2 25 04/17/2017 08:14 AM    Anion gap 6 04/07/2016 06:00 PM    Glucose 80 04/17/2017 08:14 AM    BUN 21 04/17/2017 08:14 AM    Creatinine 1.01 04/17/2017 08:14 AM    BUN/Creatinine ratio 21 04/17/2017 08:14 AM    GFR est AA 66 04/17/2017 08:14 AM    GFR est non-AA 57 04/17/2017 08:14 AM    Calcium 9.1 04/17/2017 08:14 AM    Bilirubin, total 0.3 04/17/2017 08:14 AM    AST (SGOT) 13 04/17/2017 08:14 AM    Alk. phosphatase 40 04/17/2017 08:14 AM    Protein, total 6.6 04/17/2017 08:14 AM    Albumin 4.1 04/17/2017 08:14 AM    Globulin 3.5 03/11/2010 08:44 AM    A-G Ratio 1.6 04/17/2017 08:14 AM    ALT (SGPT) 10 04/17/2017 08:14 AM     Lab Results   Component Value Date/Time    Cholesterol, total 181 04/17/2017 08:14 AM    HDL Cholesterol 60 04/17/2017 08:14 AM    LDL, calculated 114 04/17/2017 08:14 AM    VLDL, calculated 7 04/17/2017 08:14 AM    Triglyceride 36 04/17/2017 08:14 AM    CHOL/HDL Ratio 3.0 08/04/2010 06:07 PM     Lab Results   Component Value Date/Time    TSH 1.400 04/17/2017 08:14 AM    T4, Free 1.14 06/19/2014 08:11 AM     Lab Results   Component Value Date/Time    Vitamin D 25-Hydroxy 62.1 09/06/2011 09:03 AM    VITAMIN D, 25-HYDROXY 78.6 04/17/2017 08:14 AM                        ASSESSMENT and PLAN    ICD-10-CM ICD-9-CM    1. PND (paroxysmal nocturnal dyspnea) R06.00 786.09    2. Overweight (BMI 25.0-29. 9) E66.3 278.02    3. Vitamin D deficiency E55.9 268.9 VITAMIN D, 25 HYDROXY   4. Essential hypertension, benign I10 401.1 CBC WITH AUTOMATED DIFF      METABOLIC PANEL, COMPREHENSIVE      TSH 3RD GENERATION   5.  Pure hypercholesterolemia E78.00 272.0 LIPID PANEL   6. Gastroesophageal reflux disease without esophagitis K21.9 530.81 CBC WITH AUTOMATED DIFF      METABOLIC PANEL, COMPREHENSIVE     -Labs reviewed with patient in detail.  -Ctn current therapeutic treatment regimen. No medication changes currently. -RTC prn for well woman exam.    -RTC 6 months w/ labs    -Patient classified as overweight  -Advised continued exercise and dietary modifications.    -Patient agrees with assessment and plan    According to the CDC an increased BMI can lead to \"all-causes of death (mortality), High blood pressure (Hypertension), High LDL cholesterol, low HDL cholesterol, or high levels of triglycerides (Dyslipidemia), Type 2 diabetes, Coronary heart disease, Stroke, Gallbladder disease, Osteoarthritis (a breakdown of cartilage and bone within a joint), Sleep apnea and breathing problems, Chronic inflammation and increased oxidative stress, some cancers (endometrial, breast, colon, kidney, gallbladder, and liver), Low quality of life, Mental illness such as clinical depression, anxiety, and other mental disorders and Body pain and difficulty with physical functioning. \"    BMI Weight Status   Below 18.5 Underweight   18.5 - 24.9 Normal or Healthy Weight   25.0 - 29.9 Overweight   30.0 and Above Obese   40.0 and Above Morbid Obesity     Additional Instructions: The patient understands that they should contact the office at any time if any questions or concerns develop. They are also aware that they can call our main office number at [phone] at any time if they would like to address any concerns with the physician. They also understand that they should dial 911 if any acute emergency arises. The patient understands that they should give us a minimum of 48 hours to complete prescription refills once they are requested.   The patient has also been instructed to contact us by calling the main office number if they have not received feedback within 2 weeks of having any tests completed. The patient is a aware that they should read all package insert information when picking up the medications and that they should consult the pharmacist of a physician if they have any questions or concerns regarding the prescribed medications. Discussed with the patient new medications given and patient instructed to read pharmacy literature regarding side effects and drug interactions. Instructions for taking the medications were provided to the patient and the consequences of not taking it. Follow-up Disposition:   Return if symptoms worsen or fail to improve. Risk and benefits of new medication discussed in detail, patient was given the opportunity to ask questions  AVS provided  reviewed diet, exercise and weight control   Alarm signals discussed. ER precautions   Plan of care reviewed with patient. Understanding verbalized and they are in agreement with plan of care. Please note that this document was created with voice recognition software.     Wayne Eli DO

## 2017-05-22 ENCOUNTER — HOSPITAL ENCOUNTER (OUTPATIENT)
Dept: MAMMOGRAPHY | Age: 72
Discharge: HOME | End: 2017-05-22
Attending: FAMILY MEDICINE
Payer: MEDICARE

## 2017-05-22 DIAGNOSIS — Z12.31 VISIT FOR SCREENING MAMMOGRAM: ICD-10-CM

## 2017-05-22 PROCEDURE — 77063 BREAST TOMOSYNTHESIS BI: CPT

## 2017-05-23 NOTE — PROGRESS NOTES
I called the patient regarding her results but there was no answer. So i left a message that requested a call back.

## 2017-05-31 ENCOUNTER — OFFICE VISIT (OUTPATIENT)
Dept: INTERNAL MEDICINE CLINIC | Age: 72
End: 2017-05-31

## 2017-05-31 VITALS
RESPIRATION RATE: 16 BRPM | HEART RATE: 70 BPM | TEMPERATURE: 97.8 F | WEIGHT: 146.6 LBS | OXYGEN SATURATION: 100 % | DIASTOLIC BLOOD PRESSURE: 83 MMHG | BODY MASS INDEX: 25.98 KG/M2 | SYSTOLIC BLOOD PRESSURE: 117 MMHG | HEIGHT: 63 IN

## 2017-05-31 DIAGNOSIS — J02.9 SORE THROAT: Primary | ICD-10-CM

## 2017-05-31 LAB
S PYO AG THROAT QL: NEGATIVE
VALID INTERNAL CONTROL?: YES

## 2017-05-31 RX ORDER — CETIRIZINE HCL 10 MG
10 TABLET ORAL
Qty: 30 TAB | Refills: 0 | Status: SHIPPED | OUTPATIENT
Start: 2017-05-31 | End: 2018-01-24

## 2017-05-31 RX ORDER — BENZONATATE 200 MG/1
200 CAPSULE ORAL
Qty: 45 CAP | Refills: 1 | Status: SHIPPED | OUTPATIENT
Start: 2017-05-31 | End: 2017-06-07

## 2017-05-31 RX ORDER — IPRATROPIUM BROMIDE 42 UG/1
2 SPRAY, METERED NASAL 4 TIMES DAILY
Qty: 15 ML | Refills: 0 | Status: SHIPPED | OUTPATIENT
Start: 2017-05-31 | End: 2017-06-03

## 2017-05-31 NOTE — PATIENT INSTRUCTIONS
Sore Throat: Care Instructions  Your Care Instructions    Infection by bacteria or a virus causes most sore throats. Cigarette smoke, dry air, air pollution, allergies, and yelling can also cause a sore throat. Sore throats can be painful and annoying. Fortunately, most sore throats go away on their own. If you have a bacterial infection, your doctor may prescribe antibiotics. Follow-up care is a key part of your treatment and safety. Be sure to make and go to all appointments, and call your doctor if you are having problems. It's also a good idea to know your test results and keep a list of the medicines you take. How can you care for yourself at home? · If your doctor prescribed antibiotics, take them as directed. Do not stop taking them just because you feel better. You need to take the full course of antibiotics. · Gargle with warm salt water once an hour to help reduce swelling and relieve discomfort. Use 1 teaspoon of salt mixed in 1 cup of warm water. · Take an over-the-counter pain medicine, such as acetaminophen (Tylenol), ibuprofen (Advil, Motrin), or naproxen (Aleve). Read and follow all instructions on the label. · Be careful when taking over-the-counter cold or flu medicines and Tylenol at the same time. Many of these medicines have acetaminophen, which is Tylenol. Read the labels to make sure that you are not taking more than the recommended dose. Too much acetaminophen (Tylenol) can be harmful. · Drink plenty of fluids. Fluids may help soothe an irritated throat. Hot fluids, such as tea or soup, may help decrease throat pain. · Use over-the-counter throat lozenges to soothe pain. Regular cough drops or hard candy may also help. These should not be given to young children because of the risk of choking. · Do not smoke or allow others to smoke around you. If you need help quitting, talk to your doctor about stop-smoking programs and medicines.  These can increase your chances of quitting for good. · Use a vaporizer or humidifier to add moisture to your bedroom. Follow the directions for cleaning the machine. When should you call for help? Call your doctor now or seek immediate medical care if:  · You have new or worse trouble swallowing. · Your sore throat gets much worse on one side. Watch closely for changes in your health, and be sure to contact your doctor if you do not get better as expected. Where can you learn more? Go to http://rad-batool.info/. Enter 062 441 80 19 in the search box to learn more about \"Sore Throat: Care Instructions. \"  Current as of: July 29, 2016  Content Version: 11.2  © 5176-5047 [employer]. Care instructions adapted under license by [employer] (which disclaims liability or warranty for this information). If you have questions about a medical condition or this instruction, always ask your healthcare professional. [address] any warranty or liability for your use of this information.

## 2017-05-31 NOTE — PROGRESS NOTES
HISTORY OF PRESENT ILLNESS  Katty Kimball is a 73 y.o. female. HPI  Upper Respiratory Infection  Patient complains of symptoms of a URI. Symptoms include congestion, coryza, sore throat and cough. Onset of symptoms was 1 week ago, unchanged since that time. She also c/o cough described as without wheezing, dyspnea or hemoptysis and post nasal drip and sneezing . She is not drinking much. . Evaluation to date: none. Treatment to date: OTC products:ivet seltzer + and tylenol. Allergies   Allergen Reactions    Naproxen Palpitations       Past Medical History:   Diagnosis Date    Fibrocystic disease of breast     GERD (gastroesophageal reflux disease)     Hx of dizziness     Hypercholesterolemia     Hypertension     PND (paroxysmal nocturnal dyspnea)     Evaluated by Dr. Arina Bhagat (March 2012)    Vaginitis atrophic 7-29-09    Vitamin D deficiency 4-2009       Family History   Problem Relation Age of Onset    Hypertension Mother     Arthritis-osteo Mother     Heart Attack Father     Hypertension Sister     High Cholesterol Sister     Hypertension Brother        Social History   Substance Use Topics    Smoking status: Former Smoker     Packs/day: 0.50     Years: 15.00     Quit date: 12/13/1979    Smokeless tobacco: Never Used    Alcohol use No        Current Outpatient Prescriptions   Medication Sig    benzonatate (TESSALON) 200 mg capsule Take 1 Cap by mouth three (3) times daily as needed for Cough for up to 7 days.  ipratropium (ATROVENT) 0.06 % nasal spray 2 Sprays by Both Nostrils route four (4) times daily for 3 days.  cetirizine (ZYRTEC) 10 mg tablet Take 1 Tab by mouth daily as needed for Allergies.  hydroCHLOROthiazide (HYDRODIURIL) 12.5 mg tablet Take 1 Tab by mouth daily.     atenolol (TENORMIN) 100 mg tablet TAKE ONE TABLET BY MOUTH ONCE DAILY    simvastatin (ZOCOR) 20 mg tablet TAKE 1 TABLET  ONE  TIME EVERY NIGHT    omeprazole (PRILOSEC) 20 mg capsule Take 20 mg by mouth daily.  aspirin delayed-release 81 mg tablet Take 81 mg by mouth every other day.  omega-3 fatty acids-vitamin e (FISH OIL) 1,000 mg Cap Take 1 Cap by mouth daily.  Cholecalciferol, Vitamin D3, (VITAMIN D) 2,000 unit Cap Take  by mouth daily. No current facility-administered medications for this visit. Past Surgical History:   Procedure Laterality Date    HX HYSTERECTOMY  1960's    FL COLONOSCOPY FLX DX W/COLLJ SPEC WHEN PFRMD  2004    normal ; Dr. Eladia Mayers       ROS  See HPI    Visit Vitals    /83 (BP 1 Location: Left arm)    Pulse 70    Temp 97.8 °F (36.6 °C) (Oral)    Resp 16    Ht 5' 3\" (1.6 m)    Wt 146 lb 9.6 oz (66.5 kg)    SpO2 100%    BMI 25.97 kg/m2     Physical Exam   Constitutional: She appears well-developed and well-nourished. HENT:   Right Ear: No middle ear effusion. Left Ear:  No middle ear effusion. Nose: Rhinorrhea present. No mucosal edema. Mouth/Throat: Uvula is midline. Mucous membranes are dry. Posterior oropharyngeal erythema present. Cardiovascular: Normal rate and regular rhythm. Pulmonary/Chest: Effort normal and breath sounds normal.       ASSESSMENT and PLAN    ICD-10-CM ICD-9-CM    1. Sore throat J02.9 462 AMB POC RAPID STREP A      benzonatate (TESSALON) 200 mg capsule      ipratropium (ATROVENT) 0.06 % nasal spray      cetirizine (ZYRTEC) 10 mg tablet   negative rapid strep  Advised symptomatic care  -RTC prn    Additional Instructions: The patient understands that they should contact the office at any time if any questions or concerns develop. They are also aware that they can call our main office number at [phone] at any time if they would like to address any concerns with the physician. They also understand that they should dial 911 if any acute emergency arises. The patient understands that they should give us a minimum of 48 hours to complete prescription refills once they are requested.   The patient has also been instructed to contact us by calling the main office number if they have not received feedback within 2 weeks of having any tests completed. The patient is a aware that they should read all package insert information when picking up the medications and that they should consult the pharmacist of a physician if they have any questions or concerns regarding the prescribed medications. Discussed with the patient new medications given and patient instructed to read pharmacy literature regarding side effects and drug interactions. Instructions for taking the medications were provided to the patient and the consequences of not taking it. Follow-up Disposition:   Return if symptoms worsen or fail to improve. Risk and benefits of new medication discussed in detail when indicated, patient was given the opportunity to ask questions   AVS provided  reviewed diet, exercise and weight control when indicated  Alarm signals discussed. ER precautions reviewed when indicated  Plan of care reviewed with patient. Understanding verbalized and they are in agreement with plan of care.      Pilar Carver, DO

## 2017-05-31 NOTE — PROGRESS NOTES
Pt is here for sore throat, cough, sneezing  x 1 week. Denies fever    1. Have you been to the ER, urgent care clinic since your last visit? Hospitalized since your last visit? No    2. Have you seen or consulted any other health care providers outside of the [address] [place] [address] since your last visit? Include any pap smears or colon screening.  No

## 2017-05-31 NOTE — MR AVS SNAPSHOT
Visit Information Date & Time Provider Department Dept. Phone Encounter #  
 5/31/2017 11:30 AM Jacqui Ash DO [phone] Follow-up Instructions Return if symptoms worsen or fail to improve. Your Appointments 10/18/2017  8:10 AM  
LAB with Jacqui Ash DO [facility] (--) Appt Note: 6 mo f/u lab [address] [address]  
[address] [place] [address] [address] [phone]  
  
    
 10/25/2017  8:45 AM  
ROUTINE CARE with Jacqui Ash DO [facility] (--) Appt Note: 6 mo f/u  
 [address] [address] [phone]  
  
   
 [address] [phone] Upcoming Health Maintenance Date Due DTaP/Tdap/Td series (1 - Tdap) 9/12/1966 MEDICARE YEARLY EXAM 4/26/2017 Pneumococcal 65+ Low/Medium Risk (2 of 2 - PPSV23) 4/26/2018 GLAUCOMA SCREENING Q2Y 4/28/2018 BREAST CANCER SCRN MAMMOGRAM 5/22/2019 COLONOSCOPY 5/1/2025 Allergies as of 5/31/2017  Review Complete On: 5/31/2017 By: Мария Gilmore LPN Severity Noted Reaction Type Reactions Naproxen  04/06/2010    Palpitations Current Immunizations  Reviewed on 6/26/2014 No immunizations on file. Not reviewed this visit You Were Diagnosed With   
  
 Codes Comments Sore throat    -  Primary ICD-10-CM: J02.9 ICD-9-CM: 146 Vitals BP Pulse Temp Resp Height(growth percentile) Weight(growth percentile) 117/83 (BP 1 Location: Left arm) 70 97.8 °F (36.6 °C) (Oral) 16 5' 3\" (1.6 m) 146 lb 9.6 oz (66.5 kg) SpO2 BMI OB Status Smoking Status 100% 25.97 kg/m2 Hysterectomy Former Smoker Vitals History BMI and BSA Data Body Mass Index Body Surface Area  
 25.97 kg/m 2 1.72 m 2 Preferred Pharmacy Pharmacy Name Phone [facility] [address] [place], [place] [address] Your Updated Medication List  
  
   
This list is accurate as of: 17 12:14 PM.  Always use your most recent med list.  
  
  
  
  
 aspirin delayed-release 81 mg tablet Take 81 mg by mouth every other day. atenolol 100 mg tablet Commonly known as:  TENORMIN  
TAKE ONE TABLET BY MOUTH ONCE DAILY  
  
 benzonatate 200 mg capsule Commonly known as:  TESSALON Take 1 Cap by mouth three (3) times daily as needed for Cough for up to 7 days. cetirizine 10 mg tablet Commonly known as:  ZYRTEC Take 1 Tab by mouth daily as needed for Allergies. FISH OIL 1,000 mg Cap Generic drug:  omega-3 fatty acids-vitamin e Take 1 Cap by mouth daily. hydroCHLOROthiazide 12.5 mg tablet Commonly known as:  HYDRODIURIL Take 1 Tab by mouth daily. ipratropium 0.06 % nasal spray Commonly known as:  ATROVENT  
2 Sprays by Both Nostrils route four (4) times daily for 3 days. PriLOSEC 20 mg capsule Generic drug:  omeprazole Take 20 mg by mouth daily. simvastatin 20 mg tablet Commonly known as:  ZOCOR  
TAKE 1 TABLET  ONE  TIME EVERY NIGHT  
  
 VITAMIN D 2,000 unit Cap capsule Generic drug:  Cholecalciferol (Vitamin D3) Take  by mouth daily. Prescriptions Sent to Pharmacy Refills  
 benzonatate (TESSALON) 200 mg capsule 1 Sig: Take 1 Cap by mouth three (3) times daily as needed for Cough for up to 7 days. Class: Normal  
 Pharmacy: [address], [facility] Ph #: [phone] Route: Oral  
 ipratropium (ATROVENT) 0.06 % nasal spray 0 Si Sprays by Both Nostrils route four (4) times daily for 3 days. Class: Normal  
 Pharmacy: [address], [facility] Ph #: [phone] Route:  Both Nostrils  
 cetirizine (ZYRTEC) 10 mg tablet 0  
 Sig: Take 1 Tab by mouth daily as needed for Allergies. Class: Normal  
 Pharmacy: [facility] [address] [place] [address] [address] #: [phone] Route: Oral  
  
We Performed the Following AMB POC RAPID STREP A [67585 CPT(R)] Follow-up Instructions Return if symptoms worsen or fail to improve. Patient Instructions Sore Throat: Care Instructions Your Care Instructions Infection by bacteria or a virus causes most sore throats. Cigarette smoke, dry air, air pollution, allergies, and yelling can also cause a sore throat. Sore throats can be painful and annoying. Fortunately, most sore throats go away on their own. If you have a bacterial infection, your doctor may prescribe antibiotics. Follow-up care is a key part of your treatment and safety. Be sure to make and go to all appointments, and call your doctor if you are having problems. It's also a good idea to know your test results and keep a list of the medicines you take. How can you care for yourself at home? · If your doctor prescribed antibiotics, take them as directed. Do not stop taking them just because you feel better. You need to take the full course of antibiotics. · Gargle with warm salt water once an hour to help reduce swelling and relieve discomfort. Use 1 teaspoon of salt mixed in 1 cup of warm water. · Take an over-the-counter pain medicine, such as acetaminophen (Tylenol), ibuprofen (Advil, Motrin), or naproxen (Aleve). Read and follow all instructions on the label. · Be careful when taking over-the-counter cold or flu medicines and Tylenol at the same time. Many of these medicines have acetaminophen, which is Tylenol. Read the labels to make sure that you are not taking more than the recommended dose. Too much acetaminophen (Tylenol) can be harmful. · Drink plenty of fluids. Fluids may help soothe an irritated throat.  Hot fluids, such as tea or soup, may help decrease throat pain. · Use over-the-counter throat lozenges to soothe pain. Regular cough drops or hard candy may also help. These should not be given to young children because of the risk of choking. · Do not smoke or allow others to smoke around you. If you need help quitting, talk to your doctor about stop-smoking programs and medicines. These can increase your chances of quitting for good. · Use a vaporizer or humidifier to add moisture to your bedroom. Follow the directions for cleaning the machine. When should you call for help? Call your doctor now or seek immediate medical care if: 
· You have new or worse trouble swallowing. · Your sore throat gets much worse on one side. Watch closely for changes in your health, and be sure to contact your doctor if you do not get better as expected. Where can you learn more? Go to http://rad-batool.info/. Enter [phone] in the search box to learn more about \"Sore Throat: Care Instructions. \" Current as of: July 29, 2016 Content Version: 11.2 © 2728-3059 [employer]. Care instructions adapted under license by [employer] (which disclaims liability or warranty for this information). If you have questions about a medical condition or this instruction, always ask your healthcare professional. [address] any warranty or liability for your use of this information. Introducing [facility]! Dear Maricarmen Alvares: Thank you for requesting a [employer] account. Our records indicate that you already have an active [employer] account. You can access your account anytime at [email or web address] Did you know that you can access your hospital and ER discharge instructions at any time in [employer]? You can also review all of your test results from your hospital stay or ER visit. Additional Information If you have questions, please visit the Frequently Asked Questions section of the [employer] website at [email or web address] Remember, [employer] is NOT to be used for urgent needs. For medical emergencies, dial 911. Now available from your iPhone and Android! Please provide this summary of care documentation to your next provider. Your primary care clinician is listed as Marcos Schulte. If you have any questions after today's visit, please call [phone].

## 2017-06-28 RX ORDER — ATENOLOL 100 MG/1
TABLET ORAL
Qty: 30 TAB | Refills: 0 | Status: SHIPPED | OUTPATIENT
Start: 2017-06-28 | End: 2017-07-15 | Stop reason: SDUPTHER

## 2017-07-17 RX ORDER — ATENOLOL 100 MG/1
TABLET ORAL
Qty: 30 TAB | Refills: 0 | Status: SHIPPED | OUTPATIENT
Start: 2017-07-17 | End: 2017-07-26 | Stop reason: ALTCHOICE

## 2017-07-24 ENCOUNTER — TELEPHONE (OUTPATIENT)
Dept: INTERNAL MEDICINE CLINIC | Age: 72
End: 2017-07-24

## 2017-07-24 NOTE — TELEPHONE ENCOUNTER
Pt states that the atenolol is not making the medication any longer and would need something else to be called in to take it's place. Pt would like to be notified of the medication before a new medication is sent to the pharmacy.

## 2017-07-26 RX ORDER — METOPROLOL SUCCINATE 100 MG/1
100 TABLET, EXTENDED RELEASE ORAL DAILY
Qty: 30 TAB | Refills: 0 | Status: SHIPPED | OUTPATIENT
Start: 2017-07-26 | End: 2017-08-29 | Stop reason: SDUPTHER

## 2017-07-26 NOTE — TELEPHONE ENCOUNTER
Please let patient know I sent in new medication. She should schedule an appointment to be seen within a couple days of starting new medication or take her BP at home for several days to monitor is change in dosage is needed.

## 2017-07-27 NOTE — TELEPHONE ENCOUNTER
I called Pt in regards Medication for Blood Pressure. Informed Pt that a New Medication was submitted to the pharmacy. And she will need to schedule a F/U appt in 2 days after taking the medication and monitor her blood pressure at home. Pt verbalized understanding.

## 2017-10-10 RX ORDER — HYDROCHLOROTHIAZIDE 12.5 MG/1
TABLET ORAL
Qty: 90 TAB | Refills: 0 | Status: SHIPPED | OUTPATIENT
Start: 2017-10-10 | End: 2018-01-11 | Stop reason: SDUPTHER

## 2017-10-18 ENCOUNTER — HOSPITAL ENCOUNTER (OUTPATIENT)
Dept: LAB | Age: 72
Discharge: HOME | End: 2017-10-18
Payer: MEDICARE

## 2017-10-18 DIAGNOSIS — E78.00 PURE HYPERCHOLESTEROLEMIA: ICD-10-CM

## 2017-10-18 DIAGNOSIS — I10 ESSENTIAL HYPERTENSION, BENIGN: ICD-10-CM

## 2017-10-18 DIAGNOSIS — K21.9 GASTROESOPHAGEAL REFLUX DISEASE WITHOUT ESOPHAGITIS: ICD-10-CM

## 2017-10-18 DIAGNOSIS — E55.9 VITAMIN D DEFICIENCY: ICD-10-CM

## 2017-10-18 LAB
ALBUMIN SERPL-MCNC: 3.1 G/DL (ref 3.4–5)
ALBUMIN/GLOB SERPL: 0.9 {RATIO} (ref 0.8–1.7)
ALP SERPL-CCNC: 52 U/L (ref 45–117)
ALT SERPL-CCNC: 14 U/L (ref 13–56)
ANION GAP SERPL CALC-SCNC: 8 MMOL/L (ref 3–18)
AST SERPL-CCNC: 15 U/L (ref 15–37)
BASOPHILS # BLD: 0 K/UL (ref 0–0.06)
BASOPHILS NFR BLD: 0 % (ref 0–2)
BILIRUB SERPL-MCNC: 0.2 MG/DL (ref 0.2–1)
BUN SERPL-MCNC: 15 MG/DL (ref 7–18)
BUN/CREAT SERPL: 16 (ref 12–20)
CALCIUM SERPL-MCNC: 8.7 MG/DL (ref 8.5–10.1)
CHLORIDE SERPL-SCNC: 103 MMOL/L (ref 100–108)
CHOLEST SERPL-MCNC: 190 MG/DL
CO2 SERPL-SCNC: 30 MMOL/L (ref 21–32)
CREAT SERPL-MCNC: 0.95 MG/DL (ref 0.6–1.3)
DIFFERENTIAL METHOD BLD: NORMAL
EOSINOPHIL # BLD: 0.1 K/UL (ref 0–0.4)
EOSINOPHIL NFR BLD: 2 % (ref 0–5)
ERYTHROCYTE [DISTWIDTH] IN BLOOD BY AUTOMATED COUNT: 14.3 % (ref 11.6–14.5)
GLOBULIN SER CALC-MCNC: 3.4 G/DL (ref 2–4)
GLUCOSE SERPL-MCNC: 71 MG/DL (ref 74–99)
HCT VFR BLD AUTO: 39.7 % (ref 35–45)
HDLC SERPL-MCNC: 62 MG/DL (ref 40–60)
HDLC SERPL: 3.1 {RATIO} (ref 0–5)
HGB BLD-MCNC: 12.7 G/DL (ref 12–16)
LDLC SERPL CALC-MCNC: 118.8 MG/DL (ref 0–100)
LIPID PROFILE,FLP: ABNORMAL
LYMPHOCYTES # BLD: 1.9 K/UL (ref 0.9–3.6)
LYMPHOCYTES NFR BLD: 28 % (ref 21–52)
MCH RBC QN AUTO: 26.3 PG (ref 24–34)
MCHC RBC AUTO-ENTMCNC: 32 G/DL (ref 31–37)
MCV RBC AUTO: 82.2 FL (ref 74–97)
MONOCYTES # BLD: 0.6 K/UL (ref 0.05–1.2)
MONOCYTES NFR BLD: 9 % (ref 3–10)
NEUTS SEG # BLD: 4.1 K/UL (ref 1.8–8)
NEUTS SEG NFR BLD: 61 % (ref 40–73)
PLATELET # BLD AUTO: 275 K/UL (ref 135–420)
PMV BLD AUTO: 10.3 FL (ref 9.2–11.8)
POTASSIUM SERPL-SCNC: 3.5 MMOL/L (ref 3.5–5.5)
PROT SERPL-MCNC: 6.5 G/DL (ref 6.4–8.2)
RBC # BLD AUTO: 4.83 M/UL (ref 4.2–5.3)
SODIUM SERPL-SCNC: 141 MMOL/L (ref 136–145)
TRIGL SERPL-MCNC: 46 MG/DL (ref ?–150)
TSH SERPL DL<=0.05 MIU/L-ACNC: 1.79 UIU/ML (ref 0.36–3.74)
VLDLC SERPL CALC-MCNC: 9.2 MG/DL
WBC # BLD AUTO: 6.8 K/UL (ref 4.6–13.2)

## 2017-10-18 PROCEDURE — 85025 COMPLETE CBC W/AUTO DIFF WBC: CPT | Performed by: FAMILY MEDICINE

## 2017-10-18 PROCEDURE — 80061 LIPID PANEL: CPT | Performed by: FAMILY MEDICINE

## 2017-10-18 PROCEDURE — 84443 ASSAY THYROID STIM HORMONE: CPT | Performed by: FAMILY MEDICINE

## 2017-10-18 PROCEDURE — 82306 VITAMIN D 25 HYDROXY: CPT | Performed by: FAMILY MEDICINE

## 2017-10-18 PROCEDURE — 36415 COLL VENOUS BLD VENIPUNCTURE: CPT | Performed by: FAMILY MEDICINE

## 2017-10-18 PROCEDURE — 80053 COMPREHEN METABOLIC PANEL: CPT | Performed by: FAMILY MEDICINE

## 2017-10-19 LAB — 25(OH)D3 SERPL-MCNC: 55.1 NG/ML (ref 30–100)

## 2017-10-23 RX ORDER — METOPROLOL SUCCINATE 100 MG/1
TABLET, EXTENDED RELEASE ORAL
Qty: 30 TAB | Refills: 0 | Status: SHIPPED | OUTPATIENT
Start: 2017-10-23 | End: 2017-10-30 | Stop reason: SDUPTHER

## 2017-10-23 RX ORDER — METOPROLOL SUCCINATE 100 MG/1
TABLET, EXTENDED RELEASE ORAL
Qty: 30 TAB | Refills: 0 | OUTPATIENT
Start: 2017-10-23

## 2017-10-25 ENCOUNTER — OFFICE VISIT (OUTPATIENT)
Dept: FAMILY MEDICINE CLINIC | Age: 72
End: 2017-10-25

## 2017-10-25 VITALS
HEART RATE: 70 BPM | HEIGHT: 63 IN | TEMPERATURE: 97.5 F | WEIGHT: 154.6 LBS | OXYGEN SATURATION: 98 % | SYSTOLIC BLOOD PRESSURE: 166 MMHG | DIASTOLIC BLOOD PRESSURE: 97 MMHG | BODY MASS INDEX: 27.39 KG/M2 | RESPIRATION RATE: 16 BRPM

## 2017-10-25 DIAGNOSIS — Z00.00 MEDICARE ANNUAL WELLNESS VISIT, SUBSEQUENT: Primary | ICD-10-CM

## 2017-10-25 DIAGNOSIS — H53.453 ABNORMAL PERIPHERAL VISION OF BOTH EYES: ICD-10-CM

## 2017-10-25 DIAGNOSIS — E16.2 HYPOGLYCEMIA: ICD-10-CM

## 2017-10-25 DIAGNOSIS — F43.9 SITUATIONAL STRESS: ICD-10-CM

## 2017-10-25 DIAGNOSIS — Z78.0 POST-MENOPAUSE: ICD-10-CM

## 2017-10-25 DIAGNOSIS — Z12.11 COLON CANCER SCREENING: ICD-10-CM

## 2017-10-25 DIAGNOSIS — Z23 ENCOUNTER FOR IMMUNIZATION: ICD-10-CM

## 2017-10-25 DIAGNOSIS — E78.00 PURE HYPERCHOLESTEROLEMIA: ICD-10-CM

## 2017-10-25 DIAGNOSIS — I10 ESSENTIAL HYPERTENSION, BENIGN: ICD-10-CM
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
## 2017-10-25 NOTE — PROGRESS NOTES
This is a Subsequent Medicare Annual Wellness Exam (AWV) (Performed 12 months after IPPE or effective date of Medicare Part B enrollment)    I have reviewed the patient's medical history in detail and updated the computerized patient record. History     Past Medical History:   Diagnosis Date    Fibrocystic disease of breast     GERD (gastroesophageal reflux disease)     Hx of dizziness     Hypercholesterolemia     Hypertension     PND (paroxysmal nocturnal dyspnea)     Evaluated by Dr. Edyta Squires (March 2012)    Vaginitis atrophic 7-29-09    Vitamin D deficiency 4-2009      Past Surgical History:   Procedure Laterality Date    HX HYSTERECTOMY  1960's    MA COLONOSCOPY FLX DX W/COLLJ SPEC WHEN PFRMD  2004    normal ; Dr. Hodan Lamb     Current Outpatient Prescriptions   Medication Sig Dispense Refill    metoprolol succinate (TOPROL-XL) 100 mg tablet TAKE ONE TABLET BY MOUTH ONCE DAILY 30 Tab 0    hydroCHLOROthiazide (HYDRODIURIL) 12.5 mg tablet TAKE ONE TABLET BY MOUTH ONCE DAILY 90 Tab 0    cetirizine (ZYRTEC) 10 mg tablet Take 1 Tab by mouth daily as needed for Allergies. 30 Tab 0    simvastatin (ZOCOR) 20 mg tablet TAKE 1 TABLET  ONE  TIME EVERY NIGHT 90 Tab 1    omeprazole (PRILOSEC) 20 mg capsule Take 20 mg by mouth daily.  aspirin delayed-release 81 mg tablet Take 81 mg by mouth every other day.  omega-3 fatty acids-vitamin e (FISH OIL) 1,000 mg Cap Take 1 Cap by mouth daily.  Cholecalciferol, Vitamin D3, (VITAMIN D) 2,000 unit Cap Take  by mouth daily.        Allergies   Allergen Reactions    Naproxen Palpitations     Family History   Problem Relation Age of Onset    Hypertension Mother     Arthritis-osteo Mother     Heart Attack Father     Hypertension Sister     High Cholesterol Sister     Hypertension Brother      Social History   Substance Use Topics    Smoking status: Former Smoker     Packs/day: 0.50     Years: 15.00     Quit date: 12/13/1979    Smokeless tobacco: Never Used    Alcohol use No     Patient Active Problem List   Diagnosis Code    Essential hypertension, benign I10    Pure hypercholesterolemia E78.00    GERD (gastroesophageal reflux disease) K21.9    Vitamin D deficiency E55.9    PND (paroxysmal nocturnal dyspnea) R06.00    Pain in heel M79.673    Situational stress F43.9    Influenza vaccination declined Z28.21    23-polyvalent pneumococcal polysaccharide vaccine declined Z28.21    Overweight (BMI 25.0-29. 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 Functional Ability and Level of Safety:   Hearing LossHearing is good. Activities of Daily Living  The home contains: no safety equipment. Patient does total self care    Fall Risk  Fall Risk Assessment, last 12 mths 10/25/2017   Able to walk? Yes   Fall in past 12 months? No       Abuse Screen  Patient states that she is not abused    Cognitive Screening   Evaluation of Cognitive Function:  Has your family/caregiver stated any concerns about your memory: no  Normal    Patient Care Team   Patient Care Team:  Racquel Alvarez PA-C as PCP - General (Physician Assistant)    Assessment/Plan   Education and counseling provided:  Are appropriate based on today's review and evaluation    Diagnoses and all orders for this visit:    Medicare annual wellness visit, subsequent      Encounter for immunization  -     Influenza virus vaccine ([address]) 65 years and older     Colon cancer screening  -     OCCULT BLOOD, IMMUNOASSAY (FIT); Future     Post-menopause  -     DEXA BONE DENSITY STUDY AXIAL;  Future      VORB: Order for Flu Injection/Rosettae Mevelyn Asp, PA-C [facility]   Patient received High Dose Fulu vaccine 0.5ml in right deltoid. Tolerated well. No signs or symptoms of distress noted. Most current VIS given and consent signed. she was observed for 10 min post injection. There were no reactions observed. Evert Jay MPA, PA-C  [facility]  I reviewed the patient's medical history, the physician assistant's findings on physical examination, the patient's diagnoses, and treatment plan as documented in the progress note. I concur with the treatment plan as documented. There are no additional recommendations at this time.     Malik Tena MD

## 2017-10-25 NOTE — PROGRESS NOTES
Chief Complaint   Patient presents with    Hypertension    Cholesterol Problem    Vitamin D Deficiency    Annual Wellness Visit    Results     lab        Patient is here today for F/U on labs. Pt has concerns with Cholesterol medications due to legs pain at night, sts that when she doesn't take the medication she feels better. Pt also has Left shoulder pain and GERD symptoms at night. Feels like it is cutting off breath. 1. Have you been to the ER, urgent care clinic since your last visit? Hospitalized since your last visit? No    2. Have you seen or consulted any other health care providers outside of the [address] [place] [address] since your last visit? Include any pap smears or colon screening.  No

## 2017-10-25 NOTE — ACP (ADVANCE CARE PLANNING)
Do you have an Advance Care Planning? YES  Would you like to receive some information about ACP? NO  Patient has paperwork.

## 2017-10-25 NOTE — MR AVS SNAPSHOT
Visit Information Date & Time Provider Department Dept. Phone Encounter #  
 10/25/2017  8:30 AM Heavenly England PA-C [address] [phone] [id number] Follow-up Instructions Return in about 3 months (around 1/25/2018), or if symptoms worsen or fail to improve, for routine exam one week prior for labs, pneumo vacc. Your Appointments 1/17/2018  8:10 AM  
LAB with [facility] (CHANTEL Roman) Appt Note: 3 mo f/u lab [facility] [address] [address]  
[phone]  
  
   
 [address], Located within [facility]  
  
    
 1/24/2018  8:30 AM  
Office Visit with Heavenly England PA-C [facility] ([place]) Appt Note: 3 mo f/u  
 [address], [place] [address] [address]  
[phone]  
  
   
 [address],  [address] Upcoming Health Maintenance Date Due DTaP/Tdap/Td series (1 - Tdap) 11/29/2017* Pneumococcal 65+ Low/Medium Risk (2 of 2 - PPSV23) 4/26/2018 GLAUCOMA SCREENING Q2Y 4/28/2018 MEDICARE YEARLY EXAM 10/26/2018 BREAST CANCER SCRN MAMMOGRAM 5/22/2019 COLONOSCOPY 5/1/2025 *Topic was postponed. The date shown is not the original due date. Allergies as of 10/25/2017  Review Complete On: 10/25/2017 By: Heavenly England PA-C Severity Noted Reaction Type Reactions Naproxen  04/06/2010    Palpitations Current Immunizations  Reviewed on 6/26/2014 Name Date Influenza High Dose Vaccine PF  Incomplete Not reviewed this visit You Were Diagnosed With   
  
 Codes Comments Medicare annual wellness visit, subsequent    -  Primary ICD-10-CM: Z00.00 ICD-9-CM: V70.0 Situational stress     ICD-10-CM: F43.9 ICD-9-CM: V62.89 Essential hypertension, benign     ICD-10-CM: I10 
ICD-9-CM: 401.1 Pure hypercholesterolemia     ICD-10-CM: E78.00 ICD-9-CM: 272.0 Hypoglycemia     ICD-10-CM: E16.2 ICD-9-CM: 251.2 Screening for alcoholism     ICD-10-CM: Z13.89 ICD-9-CM: V79.1 Screening for depression     ICD-10-CM: Z13.89 ICD-9-CM: V79.0 Encounter for immunization     ICD-10-CM: J13 ICD-9-CM: V03.89 Colon cancer screening     ICD-10-CM: Z12.11 ICD-9-CM: V76.51 Post-menopause     ICD-10-CM: Z78.0 ICD-9-CM: V49.81 Abnormal peripheral vision of both eyes     ICD-10-CM: H53.453 ICD-9-CM: 368.44 Vitals BP Pulse Temp Resp Height(growth percentile) Weight(growth percentile) (!) 166/97 (BP 1 Location: Left arm, BP Patient Position: Sitting) 70 97.5 °F (36.4 °C) (Oral) 16 5' 3\" (1.6 m) 154 lb 9.6 oz (70.1 kg) SpO2 BMI OB Status Smoking Status 98% 27.39 kg/m2 Hysterectomy Former Smoker BMI and BSA Data Body Mass Index Body Surface Area  
 27.39 kg/m 2 1.77 m 2 Preferred Pharmacy Pharmacy Name Phone [facility] [address] [place] [address] 32 Your Updated Medication List  
  
   
This list is accurate as of: 10/25/17 10:32 AM.  Always use your most recent med list.  
  
  
  
  
 aspirin delayed-release 81 mg tablet Take 81 mg by mouth every other day. cetirizine 10 mg tablet Commonly known as:  ZYRTEC Take 1 Tab by mouth daily as needed for Allergies. FISH OIL 1,000 mg Cap Generic drug:  omega-3 fatty acids-vitamin e Take 1 Cap by mouth daily. hydroCHLOROthiazide 12.5 mg tablet Commonly known as:  HYDRODIURIL  
TAKE ONE TABLET BY MOUTH ONCE DAILY  
  
 metoprolol succinate 100 mg tablet Commonly known as:  TOPROL-XL  
TAKE ONE TABLET BY MOUTH ONCE DAILY PriLOSEC 20 mg capsule Generic drug:  omeprazole Take 20 mg by mouth daily. simvastatin 20 mg tablet Commonly known as:  ZOCOR  
TAKE 1 TABLET  ONE  TIME EVERY NIGHT  
  
 VITAMIN D 2,000 unit Cap capsule Generic drug:  Cholecalciferol (Vitamin D3) Take  by mouth daily. We Performed the Following INFLUENZA VIRUS VACCINE, HIGH DOSE SEASONAL, PRESERVATIVE FREE [06441 CPT(R)] REFERRAL TO OPHTHALMOLOGY [REF57 Custom] Comments:  
 Please evaluate and treat for decreased peripheral vision. Follow-up Instructions Return in about 3 months (around 1/25/2018), or if symptoms worsen or fail to improve, for routine exam one week prior for labs, pneumo vacc. To-Do List   
 10/25/2017 Imaging:  DEXA BONE DENSITY STUDY AXIAL   
  
 10/25/2017 Lab:  OCCULT BLOOD, IMMUNOASSAY (FIT) Referral Information Referral ID Referred By Referred To [id number] Rachelle Alexander MD   
   [address] [place] [address], [address] Phone: [phone] Fax: [phone] Visits Status Start Date End Date 1 New Request 10/25/17 10/25/18 If your referral has a status of pending review or denied, additional information will be sent to support the outcome of this decision. Patient Instructions Learning About the [address] [facility] Diet What is the Mediterranean diet? The Mediterranean diet is a style of eating rather than a diet plan. It features foods eaten in [place], [place], [place] and [place], and other countries along the [facility]. It emphasizes eating foods like fish, fruits, vegetables, beans, high-fiber breads and whole grains, nuts, and olive oil. This style of eating includes limited red meat, cheese, and sweets. Why choose the Mediterranean diet? A Mediterranean-style diet may improve heart health. It contains more fat than other heart-healthy diets. But the fats are mainly from nuts, unsaturated oils (such as fish oils and olive oil), and certain nut or seed oils (such as canola, soybean, or flaxseed oil). These fats may help protect the heart and blood vessels. How can you get started on the Mediterranean diet? Here are some things you can do to switch to a more Mediterranean way of eating. What to eat · Eat a variety of fruits and vegetables each day, such as grapes, blueberries, tomatoes, broccoli, peppers, figs, olives, spinach, eggplant, beans, lentils, and chickpeas. · Eat a variety of whole-grain foods each day, such as oats, brown rice, and whole wheat bread, pasta, and couscous. · Eat fish at least 2 times a week. Try tuna, salmon, mackerel, lake trout, herring, or sardines. · Eat moderate amounts of low-fat dairy products, such as milk, cheese, or yogurt. · Eat moderate amounts of poultry and eggs. · Choose healthy (unsaturated) fats, such as nuts, olive oil, and certain nut or seed oils like canola, soybean, and flaxseed. · Limit unhealthy (saturated) fats, such as butter, palm oil, and coconut oil. And limit fats found in animal products, such as meat and dairy products made with whole milk. Try to eat red meat only a few times a month in very small amounts. · Limit sweets and desserts to only a few times a week. This includes sugar-sweetened drinks like soda. The Mediterranean diet may also include red wine with your meal1 glass each day for women and up to 2 glasses a day for men. Tips for eating at home · Use herbs, spices, garlic, lemon zest, and citrus juice instead of salt to add flavor to foods. · Add avocado slices to your sandwich instead of falcon. · Have fish for lunch or dinner instead of red meat. Brush the fish with olive oil, and broil or grill it. · Sprinkle your salad with seeds or nuts instead of cheese. · Cook with olive or canola oil instead of butter or oils that are high in saturated fat. · Switch from 2% milk or whole milk to 1% or fat-free milk. · Dip raw vegetables in a vinaigrette dressing or hummus instead of dips made from mayonnaise or sour cream. 
· Have a piece of fruit for dessert instead of a piece of cake. Try baked apples, or have some dried fruit. Tips for eating out · Try broiled, grilled, baked, or poached fish instead of having it fried or breaded. · Ask your  to have your meals prepared with olive oil instead of butter. · Order dishes made with marinara sauce or sauces made from olive oil. Avoid sauces made from cream or mayonnaise. · Choose whole-grain breads, whole wheat pasta and pizza crust, brown rice, beans, and lentils. · Cut back on butter or margarine on bread. Instead, you can dip your bread in a small amount of olive oil. · Ask for a side salad or grilled vegetables instead of french fries or chips. Where can you learn more? Go to http://rad-batool.info/. Enter [phone] in the search box to learn more about \"Learning About the Mediterranean Diet. \" Current as of: December 29, 2016 Content Version: 11.3 © 2631-0801 [employer]. Care instructions adapted under license by [employer] (which disclaims liability or warranty for this information). If you have questions about a medical condition or this instruction, always ask your healthcare professional. [place] [address] any warranty or liability for your use of this information. Hypoglycemia: Care Instructions Your Care Instructions Hypoglycemia means that your blood sugar is low and your body is not getting enough fuel. Some people get low blood sugar from not eating often enough. Some medicines to treat diabetes can cause low blood sugar. People who have had surgery on their stomachs or intestines may get hypoglycemia. Problems with the pancreas, kidneys, or liver also can cause low blood sugar. A snack or drink with sugar in it will raise your blood sugar and should ease your symptoms right away. Your doctor may recommend that you change or stop your medicines until you can get your blood sugar levels under control.  In the long run, you may need to change your diet and eating habits so that you get enough fuel for your body throughout the day. Follow-up care is a key part of your treatment and safety. Be sure to make and go to all appointments, and call your doctor if you are having problems. It's also a good idea to know your test results and keep a list of the medicines you take. How can you care for yourself at home? · Learn to recognize the early signs of low blood sugar. Signs include: 
¨ Nausea. ¨ Hunger. ¨ Feeling nervous, irritable, or shaky. ¨ Cold, clammy, wet skin. ¨ Sweating (when you are not exercising). ¨ A fast heartbeat. ¨ Numbness or tingling of the fingertips or lips. · If you feel an episode of low blood sugar coming on, drink fruit juice or sugared (not diet) soda, or eat sugar in the form of candy, cubes, or tablets. Vizimax are another American [employer].. · Eat small, frequent meals so that you do not get too hungry between meals. · Balance extra exercise with eating more. · Keep a written record of your low blood sugar episodes, including when you last ate and what you ate, so that you can learn what causes your blood sugar to drop. · Make sure your family, friends, and coworkers know the symptoms of low blood sugar and know what to do to get your sugar level up. · Wear medical alert jewelry that lists your condition. You can buy this at most drugstores. When should you call for help? Call 911 anytime you think you may need emergency care. For example, call if: 
· You passed out (lost consciousness). · You are confused or cannot think clearly. · Your blood sugar is very high or very low. Watch closely for changes in your health, and be sure to contact your doctor if: 
· Your blood sugar stays outside the level your doctor set for you. · You have any problems. Where can you learn more? Go to http://rad-batool.info/.  
Enter X737 in the search box to learn more about \"Hypoglycemia: Care Instructions. \" Current as of: March 13, 2017 Content Version: 11.3 © 8260-3338 [employer], [employer]ancement [employer]. Care instructions adapted under license by [employer] (which disclaims liability or warranty for this information). If you have questions about a medical condition or this instruction, always ask your healthcare professional. [address] any warranty or liability for your use of this information. Medicare Wellness Visit, Female The best way to live healthy is to have a healthy lifestyle by eating a well-balanced diet, exercising regularly, limiting alcohol and stopping smoking. Regular physical exams and screening tests are another way to keep healthy. Preventive exams provided by your health care provider can find health problems before they become diseases or illnesses. Preventive services including immunizations, screening tests, monitoring and exams can help you take care of your own health. All people over age 72 should have a pneumovax  and and a prevnar shot to prevent pneumonia. These are once in a lifetime unless you and your provider decide differently. All people over 65 should have a yearly flu shot and a tetanus vaccine every 10 years. A bone mass density to screen for osteoporosis or thinning of the bones should be done every 2 years after 65. Screening for diabetes mellitus with a blood sugar test should be done every year. Glaucoma is a disease of the eye due to increased ocular pressure that can lead to blindness and it should be done every year by an eye professional. 
 
Cardiovascular screening tests that check for elevated lipids (fatty part of blood) which can lead to heart disease and strokes should be done every 5 years. Colorectal screening that evaluates for blood or polyps in your colon should be done yearly as a stool test or every five years as a flexible sigmoidoscope or every 10 years as a colonoscopy up to age 76. Breast cancer screening with a mammogram is recommended biennially  for women age 54-69. Screening for cervical cancer with a pap smear and pelvic exam is recommended for women after age 72 years every 2 years up to age 79 or when the provider and patient decide to stop. If there is a history of cervical abnormalities or other increased risk for cancer then the test is recommended yearly. Hepatitis C screening is also recommended for anyone born between [birth year] through [address]. A shingles vaccine is also recommended once in a lifetime after age 61. Your Medicare Wellness Exam is recommended annually. Here is a list of your current Health Maintenance items with a due date: 
Health Maintenance Due Topic Date Due  
 DTaP/Tdap/Td  (1 - Tdap) 09/12/1968 [facility] Annual Well Visit  04/26/2017 Osteoporosis: Care Instructions Your Care Instructions Osteoporosis causes bones to become thin and weak. It is much more common in women than in men. Osteoporosis may be very advanced before you know you have it. Sometimes the first sign is a broken bone in the hip, spine, or wrist or sudden pain in your middle or lower back. Follow-up care is a key part of your treatment and safety. Be sure to make and go to all appointments, and call your doctor if you are having problems. It's also a good idea to know your test results and keep a list of the medicines you take. How can you care for yourself at home? · Your doctor may prescribe a bisphosphonate, such as risedronate (Actonel) or alendronate (Fosamax), for osteoporosis. If you are taking one of these medicines by mouth: 
¨ Take your medicine with a full glass of water when you first get up in the morning. ¨ Do not lie down, eat, drink a beverage, or take any other medicine for at least 30 minutes after taking the drug. This helps prevent stomach problems.  
¨ Do not take your medicine late in the day if you forgot to take it in the morning. Skip it, and take the usual dose the next morning. ¨ If you have side effects, tell your doctor. He or she may prescribe another medicine. · Get enough calcium and vitamin D. The [place] of Medicine recommends adults younger than age 46 need 1,000 mg of calcium and 600 IU of vitamin D each day. Women ages 46 to 79 need 1,200 mg of calcium and 600 IU of vitamin D each day. Men ages 46 to 79 need 1,000 mg of calcium and 600 IU of vitamin D each day. Adults 71 and older need 1,200 mg of calcium and 800 IU of vitamin D each day. ¨ Eat foods rich in calcium, like yogurt, cheese, milk, and dark green vegetables. This is a good way to get the calcium you need. You can get vitamin D from eggs, fatty fish, cereal, and milk. ¨ Talk to your doctor about taking a calcium plus vitamin D supplement. Be careful, though. Adults ages 23 to 48 should not get more than 2,500 mg of calcium and 4,000 IU of vitamin D each day, whether it is from supplements and/or food. Adults ages 46 and older should not get more than 2,000 mg of calcium and 4,000 IU of vitamin D each day from supplements and/or food. · Limit alcohol to 2 drinks a day for men and 1 drink a day for women. Too much alcohol can cause health problems. · Do not smoke. Smoking puts you at a much higher risk for osteoporosis. If you need help quitting, talk to your doctor about stop-smoking programs and medicines. These can increase your chances of quitting for good. · Get regular bone-building exercise. Weight-bearing and resistance exercises keep bones healthy by working the muscles and bones against gravity. Start out at an exercise level that feels right for you. Add a little at a time until you can do the following: ¨ Do 30 minutes of weight-bearing exercise on most days of the week. Walking, jogging, stair climbing, and dancing are good choices. ¨ Do resistance exercises with weights or elastic bands 2 to 3 days a week. · Reduce your risk of falls: 
¨ Wear supportive shoes with low heels and nonslip soles. ¨ Use a cane or walker, if you need it. Use shower chairs and bath benches. Put in handrails on stairways, around your shower or tub area, and near the toilet. ¨ Keep stairs, porches, and walkways well lit. Use night-lights. ¨ Remove throw rugs and other objects that are in the way. ¨ Avoid icy, wet, or slippery surfaces. ¨ Keep a cordless phone and a flashlight with new batteries by your bed. When should you call for help? Watch closely for changes in your health, and be sure to contact your doctor if you have any problems. Where can you learn more? Go to http://radG4Sbatool.info/. Enter K100 in the search box to learn more about \"Osteoporosis: Care Instructions. \" Current as of: August 9, 2016 Content Version: 11.3 © 7222-1702 [employer]. Care instructions adapted under license by [employer] (which disclaims liability or warranty for this information). If you have questions about a medical condition or this instruction, always ask your healthcare professional. [address] any warranty or liability for your use of this information. High Cholesterol: Care Instructions Your Care Instructions Cholesterol is a type of fat in your blood. It is needed for many body functions, such as making new cells. Cholesterol is made by your body. It also comes from food you eat. High cholesterol means that you have too much of the fat in your blood. This raises your risk of a heart attack and stroke. LDL and HDL are part of your total cholesterol. LDL is the \"bad\" cholesterol. High LDL can raise your risk for heart disease, heart attack, and stroke. HDL is the \"good\" cholesterol. It helps clear bad cholesterol from the body. High HDL is linked with a lower risk of heart disease, heart attack, and stroke. Your cholesterol levels help your doctor find out your risk for having a heart attack or stroke. You and your doctor can talk about whether you need to lower your risk and what treatment is best for you. A heart-healthy lifestyle along with medicines can help lower your cholesterol and your risk. The way you choose to lower your risk will depend on how high your risk is for heart attack and stroke. It will also depend on how you feel about taking medicines. Follow-up care is a key part of your treatment and safety. Be sure to make and go to all appointments, and call your doctor if you are having problems. It's also a good idea to know your test results and keep a list of the medicines you take. How can you care for yourself at home? · Eat a variety of foods every day. Good choices include fruits, vegetables, whole grains (like oatmeal), dried beans and peas, nuts and seeds, soy products (like tofu), and fat-free or low-fat dairy products. · Replace butter, margarine, and hydrogenated or partially hydrogenated oils with olive and canola oils. (Canola oil margarine without trans fat is fine.) · Replace red meat with fish, poultry, and soy protein (like tofu). · Limit processed and packaged foods like chips, crackers, and cookies. · Bake, broil, or steam foods. Don't purdy them. · Be physically active. Get at least 30 minutes of exercise on most days of the week. Walking is a good choice. You also may want to do other activities, such as running, swimming, cycling, or playing tennis or team sports. · Stay at a healthy weight or lose weight by making the changes in eating and physical activity listed above. Losing just a small amount of weight, even 5 to 10 pounds, can reduce your risk for having a heart attack or stroke. · Do not smoke. When should you call for help? Watch closely for changes in your health, and be sure to contact your doctor if: 
· You need help making lifestyle changes. · You have questions about your medicine. Where can you learn more? Go to http://rad-batool.info/. Enter V554 in the search box to learn more about \"High Cholesterol: Care Instructions. \" Current as of: April 3, 2017 Content Version: 11.3 © 3444-4258 Alle[employer]. Care instructions adapted under license by [employer] (which disclaims liability or warranty for this information). If you have questions about a medical condition or this instruction, always ask your healthcare professional. [address] any warranty or liability for your use of this information. Advance Care Planning: Care Instructions Your Care Instructions It can be hard to live with an illness that cannot be cured. But if your health is getting worse, you may want to make decisions about end-of-life care. Planning for the end of your life does not mean that you are giving up. It is a way to make sure that your wishes are met. Clearly stating your wishes can make it easier for your loved ones. Making plans while you are still able may also ease your mind and make your final days less stressful and more meaningful. Follow-up care is a key part of your treatment and safety. Be sure to make and go to all appointments, and call your doctor if you are having problems. It's also a good idea to know your test results and keep a list of the medicines you take. What can you do to plan for the end of life? · You can bring these issues up with your doctor. You do not need to wait until your doctor starts the conversation. You might start with \"I would not be willing to live with . Elza Gearing Elza Gearing Elza Gearing \" When you complete this sentence it helps your doctor understand your wishes. · Talk openly and honestly with your doctor. This is the best way to understand the decisions you will need to make as your health changes. Know that you can always change your mind. · Ask your doctor about commonly used life-support measures. These include tube feedings, breathing machines, and fluids given through a vein (IV). Understanding these treatments will help you decide whether you want them. · You may choose to have these life-supporting treatments for a limited time. This allows a trial period to see whether they will help you. You may also decide that you want your doctor to take only certain measures to keep you alive. It is important to spell out these conditions so that your doctor and family understand them. · Talk to your doctor about how long you are likely to live. He or she may be able to give you an idea of what usually happens with your specific illness. · Think about preparing papers that state your wishes. This way there will not be any confusion about what you want. You can change your instructions at any time. Which papers should you prepare? Advance directives are legal papers that tell doctors how you want to be cared for at the end of your life. You do not need a  to write these papers. Ask your doctor or your state [facility] department for information on how to write your advance directives. They may have the forms for each of these types of papers. Make sure your doctor has a copy of these on file, and give a copy to a family member or close friend. · Consider a do-not-resuscitate order (DNR). This order asks that no extra treatments be done if your heart stops or you stop breathing. Extra treatments may include cardiopulmonary resuscitation (CPR), electrical shock to restart your heart, or a machine to breathe for you. If you decide to have a DNR order, ask your doctor to explain and write it. Place the order in your home where everyone can easily see it. · Consider a living will.  A living will explains your wishes about life support and other treatments at the end of your life if you become unable to speak for yourself. Living hernandez tell doctors to use or not use treatments that would keep you alive. You must have one or two witnesses or a notary present when you sign this form. · Consider a durable power of  for health care. This allows you to name a person to make decisions about your care if you are not able to. Most people ask a close friend or family member. Talk to this person about the kinds of treatments you want and those that you do not want. Make sure this person understands your wishes. These legal papers are simple to change. Tell your doctor what you want to change, and ask him or her to make a note in your medical file. Give your family updated copies of the papers. Where can you learn more? Go to http://rad-batool.info/. Enter P184 in the search box to learn more about \"Advance Care Planning: Care Instructions. \" Current as of: November 17, 2016 Content Version: 11.3 © 9359-6829 [employer]. Care instructions adapted under license by [employer] (which disclaims liability or warranty for this information). If you have questions about a medical condition or this instruction, always ask your healthcare professional. [place] [address] any warranty or liability for your use of this information. Vaccine Information Statement Influenza (Flu) Vaccine (Inactivated or Recombinant): What you need to know Many Vaccine Information Statements are available in Malay and other languages. See www.immunize.org/vis Hojas de Información Sobre Vacunas están disponibles en Español y en muchos otros idiomas. Visite www.immunize.org/vis 1. Why get vaccinated? Influenza (flu) is a contagious disease that spreads around the [place] every year, usually between October and May. Flu is caused by influenza viruses, and is spread mainly by coughing, sneezing, and close contact. Anyone can get flu. Flu strikes suddenly and can last several days. Symptoms vary by age, but can include: 
 fever/chills  sore throat  muscle aches  fatigue  cough  headache  runny or stuffy nose Flu can also lead to pneumonia and blood infections, and cause diarrhea and seizures in children. If you have a medical condition, such as heart or lung disease, flu can make it worse. Flu is more dangerous for some people. Infants and young children, people 72years of age and older, pregnant women, and people with certain health conditions or a weakened immune system are at greatest risk. Each year thousands of people in the [facility] die from flu, and many more are hospitalized. Flu vaccine can: 
 keep you from getting flu, 
 make flu less severe if you do get it, and 
 keep you from spreading flu to your family and other people. 2. Inactivated and recombinant flu vaccines A dose of flu vaccine is recommended every flu season. Children 6 months through 6years of age may need two doses during the same flu season. Everyone else needs only one dose each flu season. Some inactivated flu vaccines contain a very small amount of a mercury-based preservative called thimerosal. Studies have not shown thimerosal in vaccines to be harmful, but flu vaccines that do not contain thimerosal are available. There is no live flu virus in flu shots. They cannot cause the flu. There are many flu viruses, and they are always changing. Each year a new flu vaccine is made to protect against three or four viruses that are likely to cause disease in the upcoming flu season. But even when the vaccine doesnt exactly match these viruses, it may still provide some protection Flu vaccine cannot prevent: 
 flu that is caused by a virus not covered by the vaccine, or 
 illnesses that look like flu but are not.  
 
It takes about 2 weeks for protection to develop after vaccination, and protection lasts through the flu season. 3. Some people should not get this vaccine Tell the person who is giving you the vaccine:  If you have any severe, life-threatening allergies. If you ever had a life-threatening allergic reaction after a dose of flu vaccine, or have a severe allergy to any part of this vaccine, you may be advised not to get vaccinated. Most, but not all, types of flu vaccine contain a small amount of egg protein.  If you ever had Guillain-Barré Syndrome (also called GBS). Some people with a history of GBS should not get this vaccine. This should be discussed with your doctor.  If you are not feeling well. It is usually okay to get flu vaccine when you have a mild illness, but you might be asked to come back when you feel better. 4. Risks of a vaccine reaction With any medicine, including vaccines, there is a chance of reactions. These are usually mild and go away on their own, but serious reactions are also possible. Most people who get a flu shot do not have any problems with it. Minor problems following a flu shot include:  
 soreness, redness, or swelling where the shot was given  hoarseness  sore, red or itchy eyes  cough  fever  aches  headache  itching  fatigue If these problems occur, they usually begin soon after the shot and last 1 or 2 days. More serious problems following a flu shot can include the following:  There may be a small increased risk of Guillain-Barré Syndrome (GBS) after inactivated flu vaccine. This risk has been estimated at 1 or 2 additional cases per million people vaccinated. This is much lower than the risk of severe complications from flu, which can be prevented by flu vaccine.    
 
 Young children who get the flu shot along with pneumococcal vaccine (PCV13) and/or DTaP vaccine at the same time might be slightly more likely to have a seizure caused by fever. Ask your doctor for more information. Tell your doctor if a child who is getting flu vaccine has ever had a seizure. Problems that could happen after any injected vaccine:  People sometimes faint after a medical procedure, including vaccination. Sitting or lying down for about 15 minutes can help prevent fainting, and injuries caused by a fall. Tell your doctor if you feel dizzy, or have vision changes or ringing in the ears.  Some people get severe pain in the shoulder and have difficulty moving the arm where a shot was given. This happens very rarely.  Any medication can cause a severe allergic reaction. Such reactions from a vaccine are very rare, estimated at about 1 in a million doses, and would happen within a few minutes to a few hours after the vaccination. As with any medicine, there is a very remote chance of a vaccine causing a serious injury or death. The safety of vaccines is always being monitored. For more information, visit: www.cdc.gov/vaccinesafety/ 
 
5. What if there is a serious reaction? What should I look for?  Look for anything that concerns you, such as signs of a severe allergic reaction, very high fever, or unusual behavior. Signs of a severe allergic reaction can include hives, swelling of the face and throat, difficulty breathing, a fast heartbeat, dizziness, and weakness  usually within a few minutes to a few hours after the vaccination. What should I do?  If you think it is a severe allergic reaction or other emergency that cant wait, call 9-1-1 and get the person to the nearest hospital. Otherwise, call your doctor.  Reactions should be reported to the Vaccine Adverse Event Reporting System (VAERS). Your doctor should file this report, or you can do it yourself through  the VAERS web site at www.vaers. [facility].gov, or by calling 4-786.366.8552. VAERS does not give medical advice. 6. The National Vaccine Injury Compensation Program 
 
The [facility] Vaccine Injury Compensation Program (VICP) is a federal program that was created to compensate people who may have been injured by certain vaccines. Persons who believe they may have been injured by a vaccine can learn about the program and about filing a claim by calling 9-647.909.5329 or visiting the [employer] website at www.[facility].gov/vaccinecompensation. There is a time limit to file a claim for compensation. 7. How can I learn more?  Ask your healthcare provider. He or she can give you the vaccine package insert or suggest other sources of information.  Call your local or state health department.  Contact the Centers for Disease Control and Prevention (CDC): 
- Call 1-596.984.6629 (1-800-CDC-INFO) or 
- Visit CDCs website at www.cdc.gov/flu Vaccine Information Statement Inactivated Influenza Vaccine 8/7/2015 
42 CECY Barrientos [id number] [facility] and [employer] Centers for Disease Control and Prevention Office Use Only Vaccine Information Statement Shingles Vaccine: What You Need to Know Many Vaccine Information Statements are available in Tamazight and other languages. See WorthScale.si. 1. What is shingles? Shingles is a painful skin rash, often with blisters. It is also called Herpes Zoster, or just Zoster. A shingles rash usually appears on one side of the face or body and lasts from 2 to 4 weeks. Its main symptom is pain, which can be quite severe. Other symptoms of shingles can include fever, headache, chills and upset stomach. Very rarely, a shingles infection can lead to pneumonia, hearing problems, blindness, brain inflammation (encephalitis) or death. For about 1 person in 5, severe pain can continue even long after the rash clears up. This is called  post-herpetic neuralgia.    
 
Shingles is caused by the Varicella Zoster virus, the same virus that causes chickenpox. Only someone who has had chickenpox  or, rarely, has gotten chickenpox vaccine  can get shingles. The virus stays in your body, and can cause shingles many years later. You cant catch shingles from another person with shingles. However, a person who has never had chickenpox (or chickenpox vaccine) could get chickenpox from someone with shingles. This is not very common. Shingles is far more common in people 48years of age and older than in younger people. It is also more common in people whose immune systems are weakened because of a disease such as cancer, or drugs such as steroids or chemotherapy. At least 1 million people a year in the [facility] get shingles. 2. Shingles vaccine A vaccine for shingles was licensed in 6805. In clinical trials, the vaccine reduced the risk of shingles by 50%. It can also reduce pain in people who still get shingles after being vaccinated. A single dose of shingles vaccine is recommended for adults 48years of age and older. 3. Some people should not get shingles vaccine or should wait A person should not get shingles vaccine who:  
 
 has ever had a life-threatening allergic reaction to gelatin, the antibiotic neomycin, or any other component of  shingles vaccine. Tell your doctor if you have any severe allergies.  has a weakened immune system because of current: - AIDS or another disease that affects the immune system,  
 - treatment with drugs that affect the immune system, such as prolonged use of high-dose steroids,  
 - cancer treatment such as radiation or chemotherapy, 
 - cancer affecting the bone marrow or lymphatic system, such as leukemia or  
  lymphoma. Keaton Jenny is pregnant, or might be pregnant. Women should not become pregnant until at least  
 4 weeks after getting shingles vaccine. Someone with a minor acute illness, such as a cold, may be vaccinated.  But anyone with a moderate or severe acute illness should usually wait until they recover before getting the vaccine. This includes anyone with a temperature of 101.3° F or higher. 4. What are the risks from shingles vaccine? A vaccine, like any medicine, could  possibly cause serious problems, such as severe 
allergic reactions. However, the risk of a vaccine causing serious harm, or death, is extremely small. No serious problems have been identified with shingles vaccine. Mild Problems  Redness, soreness, swelling, or itching  at the site of the injection (about 1person in 3).  Headache (about 1 person in 79). Like all vaccines, shingles vaccine is being closely monitored for unusual or severe problems. 5. What if there is a moderate or severe reaction? What should I look for? Any unusual condition, such as a severe allergic reaction or a high fever. If a severe allergic reaction occurred, it would be within a few minutes to an hour after the shot. Signs of a serious allergic reaction can include difficulty breathing, weakness, hoarseness or wheezing, a fast heart-beat, hives, dizziness, paleness, or swelling of the throat. What should I do?  Call a doctor, or get the person to a doctor right away.  Tell your doctor what happened, the date and time it happened, and when the vaccination was given.  Ask your provider to report the reaction by filing a Vaccine Adverse Event Reporting System (VAERS) form. Or you can file this report through the VAERS website at www.vaers. hhs.gov, or by calling 9-472.685.3455. VAERS does not provide medical advice. 6. How can I learn more?  Ask your doctor or other health care provider. They can give you the vaccine package insert or suggest other sources of information.  Contact the Centers for Disease Control and Prevention (CDC): 
 
 - Call 4-707.129.9567 (1-800-CDC-INFO) - Visit the CDCs website at www.cdc.gov/vaccines Vaccine Information Statement Shingles (10/6/2009) Department of Health and [employer] Centers for Disease Control and Prevention Medicare Wellness Visit, Female The best way to live healthy is to have a healthy lifestyle by eating a well-balanced diet, exercising regularly, limiting alcohol and stopping smoking. Regular physical exams and screening tests are another way to keep healthy. Preventive exams provided by your health care provider can find health problems before they become diseases or illnesses. Preventive services including immunizations, screening tests, monitoring and exams can help you take care of your own health. All people over age 72 should have a pneumovax  and and a prevnar shot to prevent pneumonia. These are once in a lifetime unless you and your provider decide differently. All people over 65 should have a yearly flu shot and a tetanus vaccine every 10 years. A bone mass density to screen for osteoporosis or thinning of the bones should be done every 2 years after 65. Screening for diabetes mellitus with a blood sugar test should be done every year. Glaucoma is a disease of the eye due to increased ocular pressure that can lead to blindness and it should be done every year by an eye professional. 
 
Cardiovascular screening tests that check for elevated lipids (fatty part of blood) which can lead to heart disease and strokes should be done every 5 years. Colorectal screening that evaluates for blood or polyps in your colon should be done yearly as a stool test or every five years as a flexible sigmoidoscope or every 10 years as a colonoscopy up to age 76. Breast cancer screening with a mammogram is recommended biennially  for women age 54-69.  
 
Screening for cervical cancer with a pap smear and pelvic exam is recommended for women after age 72 years every 2 years up to age 79 or when the provider and patient decide to stop. If there is a history of cervical abnormalities or other increased risk for cancer then the test is recommended yearly. Hepatitis C screening is also recommended for anyone born between 80 through [address]. A shingles vaccine is also recommended once in a lifetime after age 61. Your Medicare Wellness Exam is recommended annually. Here is a list of your current Health Maintenance items with a due date: There are no preventive care reminders to display for this patient. Well Visit, Over 72: Care Instructions Your Care Instructions Physical exams can help you stay healthy. Your doctor has checked your overall health and may have suggested ways to take good care of yourself. He or she also may have recommended tests. At home, you can help prevent illness with healthy eating, regular exercise, and other steps. Follow-up care is a key part of your treatment and safety. Be sure to make and go to all appointments, and call your doctor if you are having problems. It's also a good idea to know your test results and keep a list of the medicines you take. How can you care for yourself at home? · Reach and stay at a healthy weight. This will lower your risk for many problems, such as obesity, diabetes, heart disease, and high blood pressure. · Get at least 30 minutes of exercise on most days of the week. Walking is a good choice. You also may want to do other activities, such as running, swimming, cycling, or playing tennis or team sports. · Do not smoke. Smoking can make health problems worse. If you need help quitting, talk to your doctor about stop-smoking programs and medicines. These can increase your chances of quitting for good. · Protect your skin from too much sun. When you're outdoors from 10 a.m. to 4 p.m., stay in the shade or cover up with clothing and a hat with a wide brim. Wear sunglasses that block UV rays.  Even when it's cloudy, put broad-spectrum sunscreen (SPF 30 or higher) on any exposed skin. · See a dentist one or two times a year for checkups and to have your teeth cleaned. · Wear a seat belt in the car. · Limit alcohol to 2 drinks a day for men and 1 drink a day for women. Too much alcohol can cause health problems. Follow your doctor's advice about when to have certain tests. These tests can spot problems early. For men and women · Cholesterol. Your doctor will tell you how often to have this done based on your overall health and other things that can increase your risk for heart attack and stroke. · Blood pressure. Have your blood pressure checked during a routine doctor visit. Your doctor will tell you how often to check your blood pressure based on your age, your blood pressure results, and other factors. · Diabetes. Ask your doctor whether you should have tests for diabetes. · Vision. Experts recommend that you have yearly exams for glaucoma and other age-related eye problems. · Hearing. Tell your doctor if you notice any change in your hearing. You can have tests to find out how well you hear. · Colon cancer tests. Keep having colon cancer tests as your doctor recommends. You can have one of several types of tests. · Heart attack and stroke risk. At least every 4 to 6 years, you should have your risk for heart attack and stroke assessed. Your doctor uses factors such as your age, blood pressure, cholesterol, and whether you smoke or have diabetes to show what your risk for a heart attack or stroke is over the next 10 years. · Osteoporosis. Talk to your doctor about whether you should have a bone density test to find out whether you have thinning bones. Also ask your doctor about whether you should take calcium and vitamin D supplements. For women · Pap test and pelvic exam. You may no longer need a Pap test. Talk with your doctor about whether to stop or continue to have Pap tests. · Breast exam and mammogram. Ask how often you should have a mammogram, which is an X-ray of your breasts. A mammogram can spot breast cancer before it can be felt and when it is easiest to treat. · Thyroid disease. Talk to your doctor about whether to have your thyroid checked as part of a regular physical exam. Women have an increased chance of a thyroid problem. For men · Prostate exam. Talk to your doctor about whether you should have a blood test (called a PSA test) for prostate cancer. Experts disagree on whether men should have this test. Some experts recommend that you discuss the benefits and risks of the test with your doctor. · Abdominal aortic aneurysm. Ask your doctor whether you should have a test to check for an aneurysm. You may need a test if you ever smoked or if your parent, brother, sister, or child has had an aneurysm. When should you call for help? Watch closely for changes in your health, and be sure to contact your doctor if you have any problems or symptoms that concern you. Where can you learn more? Go to http://rad-batool.info/. Enter M535 in the search box to learn more about \"Well Visit, Over 65: Care Instructions. \" Current as of: July 19, 2016 Content Version: 11.3 © 8217-6864 [employer]. Care instructions adapted under license by [employer] (which disclaims liability or warranty for this information). If you have questions about a medical condition or this instruction, always ask your healthcare professional. [place] [address] any warranty or liability for your use of this information. Medicare Part B Preventive Services Limitations Recommendation Scheduled Bone Mass Measurement 
(age 72 & older, biennial) Requires diagnosis related to osteoporosis or estrogen deficiency.  Biennial benefit unless patient has history of long-term glucocorticoid tx or baseline is needed because initial test was by other method  7-15-15 Cardiovascular Screening Blood Tests (every 5 years) Total cholesterol, HDL, Triglycerides Order as a panel if possible  10-18-17 Colorectal Cancer Screening 
-Fecal occult blood test (annual) -Flexible sigmoidoscopy (5y) 
-Screening colonoscopy (10y) -Barium Enema   5-1-15 Counseling to Prevent Tobacco Use (up to 8 sessions per year) - Counseling greater than 3 and up to 10 minutes - Counseling greater than 10 minutes Patients must be asymptomatic of tobacco-related conditions to receive as preventive service  n/a Diabetes Screening Tests (at least every 3 years, Medicare covers annually or at 6-month intervals for prediabetic patients) Fasting blood sugar (FBS) or glucose tolerance test (GTT) Patient must be diagnosed with one of the following: 
-Hypertension, Dyslipidemia, obesity, previous impaired FBS or GTT 
Or any two of the following: overweight, FH of diabetes, age ? 72, history of gestational diabetes, birth of baby weighing more than 9 pounds  n/a Diabetes Self-Management Training (DSMT) (no USPSTF recommendation) Requires referral by treating physician for patient with diabetes or renal disease. 10 hours of initial DSMT session of no less than 30 minutes each in a continuous 12-month period. 2 hours of follow-up DSMT in subsequent years. Glaucoma Screening (no USPSTF recommendation) Diabetes mellitus, family history, , age 48 or over,  American, age 72 or over  4-28-16 Human Immunodeficiency Virus (HIV) Screening (annually for increased risk patients) HIV-1 and HIV-2 by EIA, AGNIESZKA, rapid antibody test, or oral mucosa transudate Patient must be at increased risk for HIV infection per USPSTF guidelines or pregnant. Tests covered annually for patients at increased risk. Pregnant patients may receive up to 3 test during pregnancy.     
Medical Nutrition Therapy (MNT) (for diabetes or renal disease not recommended schedule) Requires referral by treating physician for patient with diabetes or renal disease. Can be provided in same year as diabetes self-management training (DSMT), and CMS recommends medical nutrition therapy take place after DSMT. Up to 3 hours for initial year and 2 hours in subsequent years. Shingles Vaccination A shingles vaccine is also recommended once in a lifetime after age 61  ordered Seasonal Influenza Vaccination (annually)   25/ 10/2017 Pneumococcal Vaccination (once after 65)   Next visit Hepatitis B Vaccinations (if medium/high risk) Medium/high risk factors:  End-stage renal disease, Hemophiliacs who received Factor VIII or IX concentrates, Clients of institutions for the mentally retarded, Persons who live in the same house as a HepB virus carrier, Homosexual men, Illicit injectable drug abusers. Screening Mammography (biennial age 54-69) Annually (age 36 or over)  5-22-17 Screening Pap Tests and Pelvic Examination (up to age 79 and after 79 if unknown history or abnormal study last 10 years) Every 24 months except high risk  n/a Ultrasound Screening for Abdominal Aortic Aneurysm (AAA) (once) Patient must be referred through [facility] and not have had a screening for abdominal aortic aneurysm before under Medicare. Limited to patients who meet one of the following criteria: 
- Men who are 73-68 years old and have smoked more than 100 cigarettes in their lifetime. 
-Anyone with a FH of AAA 
-Anyone recommended for screening by [facility]  n/a Introducing [facility]! Dear Vishal Johnson: Thank you for requesting a [employer] account. Our records indicate that you already have an active [employer] account. You can access your account anytime at [email or web address] Did you know that you can access your hospital and ER discharge instructions at any time in [employer]? You can also review all of your test results from your hospital stay or ER visit. Additional Information If you have questions, please visit the Frequently Asked Questions section of the [email or web address] website at [email or web address] Remember, [facility] is NOT to be used for urgent needs. For medical emergencies, dial 911. Now available from your iPhone and Android! Please provide this summary of care documentation to your next provider. Your primary care clinician is listed as Claudette Romero. If you have any questions after today's visit, please call [phone].

## 2017-10-25 NOTE — PATIENT INSTRUCTIONS
Learning About the [address] [employer] [facility] Diet  What is the Mediterranean diet? The Mediterranean diet is a style of eating rather than a diet plan. It features foods eaten in [place], [place], [place] and [place], and other countries along the [facility]. It emphasizes eating foods like fish, fruits, vegetables, beans, high-fiber breads and whole grains, nuts, and olive oil. This style of eating includes limited red meat, cheese, and sweets. Why choose the Mediterranean diet? A Mediterranean-style diet may improve heart health. It contains more fat than other heart-healthy diets. But the fats are mainly from nuts, unsaturated oils (such as fish oils and olive oil), and certain nut or seed oils (such as canola, soybean, or flaxseed oil). These fats may help protect the heart and blood vessels. How can you get started on the Mediterranean diet? Here are some things you can do to switch to a more Mediterranean way of eating. What to eat  · Eat a variety of fruits and vegetables each day, such as grapes, blueberries, tomatoes, broccoli, peppers, figs, olives, spinach, eggplant, beans, lentils, and chickpeas. · Eat a variety of whole-grain foods each day, such as oats, brown rice, and whole wheat bread, pasta, and couscous. · Eat fish at least 2 times a week. Try tuna, salmon, mackerel, lake trout, herring, or sardines. · Eat moderate amounts of low-fat dairy products, such as milk, cheese, or yogurt. · Eat moderate amounts of poultry and eggs. · Choose healthy (unsaturated) fats, such as nuts, olive oil, and certain nut or seed oils like canola, soybean, and flaxseed. · Limit unhealthy (saturated) fats, such as butter, palm oil, and coconut oil. And limit fats found in animal products, such as meat and dairy products made with whole milk. Try to eat red meat only a few times a month in very small amounts. · Limit sweets and desserts to only a few times a week.  This includes sugar-sweetened drinks like soda. The Mediterranean diet may also include red wine with your meal1 glass each day for women and up to 2 glasses a day for men. Tips for eating at home  · Use herbs, spices, garlic, lemon zest, and citrus juice instead of salt to add flavor to foods. · Add avocado slices to your sandwich instead of falcon. · Have fish for lunch or dinner instead of red meat. Brush the fish with olive oil, and broil or grill it. · Sprinkle your salad with seeds or nuts instead of cheese. · Cook with olive or canola oil instead of butter or oils that are high in saturated fat. · Switch from 2% milk or whole milk to 1% or fat-free milk. · Dip raw vegetables in a vinaigrette dressing or hummus instead of dips made from mayonnaise or sour cream.  · Have a piece of fruit for dessert instead of a piece of cake. Try baked apples, or have some dried fruit. Tips for eating out  · Try broiled, grilled, baked, or poached fish instead of having it fried or breaded. · Ask your  to have your meals prepared with olive oil instead of butter. · Order dishes made with marinara sauce or sauces made from olive oil. Avoid sauces made from cream or mayonnaise. · Choose whole-grain breads, whole wheat pasta and pizza crust, brown rice, beans, and lentils. · Cut back on butter or margarine on bread. Instead, you can dip your bread in a small amount of olive oil. · Ask for a side salad or grilled vegetables instead of french fries or chips. Where can you learn more? Go to http://rad-batool.info/. Enter [phone] in the search box to learn more about \"Learning About the Mediterranean Diet. \"  Current as of: December 29, 2016  Content Version: 11.3  © 9979-4042 [employer]. Care instructions adapted under license by [employer] (which disclaims liability or warranty for this information).  If you have questions about a medical condition or this instruction, always ask your healthcare professional. [address] any warranty or liability for your use of this information. Hypoglycemia: Care Instructions  Your Care Instructions  Hypoglycemia means that your blood sugar is low and your body is not getting enough fuel. Some people get low blood sugar from not eating often enough. Some medicines to treat diabetes can cause low blood sugar. People who have had surgery on their stomachs or intestines may get hypoglycemia. Problems with the pancreas, kidneys, or liver also can cause low blood sugar. A snack or drink with sugar in it will raise your blood sugar and should ease your symptoms right away. Your doctor may recommend that you change or stop your medicines until you can get your blood sugar levels under control. In the long run, you may need to change your diet and eating habits so that you get enough fuel for your body throughout the day. Follow-up care is a key part of your treatment and safety. Be sure to make and go to all appointments, and call your doctor if you are having problems. It's also a good idea to know your test results and keep a list of the medicines you take. How can you care for yourself at home? · Learn to recognize the early signs of low blood sugar. Signs include:  ¨ Nausea. ¨ Hunger. ¨ Feeling nervous, irritable, or shaky. ¨ Cold, clammy, wet skin. ¨ Sweating (when you are not exercising). ¨ A fast heartbeat. ¨ Numbness or tingling of the fingertips or lips. · If you feel an episode of low blood sugar coming on, drink fruit juice or sugared (not diet) soda, or eat sugar in the form of candy, cubes, or tablets. [employer] are another [employer]. · Eat small, frequent meals so that you do not get too hungry between meals. · Balance extra exercise with eating more.   · Keep a written record of your low blood sugar episodes, including when you last ate and what you ate, so that you can learn what causes your blood sugar to drop.  · Make sure your family, friends, and coworkers know the symptoms of low blood sugar and know what to do to get your sugar level up. · Wear medical alert jewelry that lists your condition. You can buy this at most drugstores. When should you call for help? Call 911 anytime you think you may need emergency care. For example, call if:  · You passed out (lost consciousness). · You are confused or cannot think clearly. · Your blood sugar is very high or very low. Watch closely for changes in your health, and be sure to contact your doctor if:  · Your blood sugar stays outside the level your doctor set for you. · You have any problems. Where can you learn more? Go to http://rad-batool.info/. Enter R895 in the search box to learn more about \"Hypoglycemia: Care Instructions. \"  Current as of: March 13, 2017  Content Version: 11.3  © 5165-5896 [employer]. Care instructions adapted under license by [employer] (which disclaims liability or warranty for this information). If you have questions about a medical condition or this instruction, always ask your healthcare professional. [place] [address] any warranty or liability for your use of this information. Medicare Wellness Visit, Female    The best way to live healthy is to have a healthy lifestyle by eating a well-balanced diet, exercising regularly, limiting alcohol and stopping smoking. Regular physical exams and screening tests are another way to keep healthy. Preventive exams provided by your health care provider can find health problems before they become diseases or illnesses. Preventive services including immunizations, screening tests, monitoring and exams can help you take care of your own health. All people over age 72 should have a pneumovax  and and a prevnar shot to prevent pneumonia. These are once in a lifetime unless you and your provider decide differently.     All people over 65 should have a yearly flu shot and a tetanus vaccine every 10 years. A bone mass density to screen for osteoporosis or thinning of the bones should be done every 2 years after 65. Screening for diabetes mellitus with a blood sugar test should be done every year. Glaucoma is a disease of the eye due to increased ocular pressure that can lead to blindness and it should be done every year by an eye professional.    Cardiovascular screening tests that check for elevated lipids (fatty part of blood) which can lead to heart disease and strokes should be done every 5 years. Colorectal screening that evaluates for blood or polyps in your colon should be done yearly as a stool test or every five years as a flexible sigmoidoscope or every 10 years as a colonoscopy up to age 76. Breast cancer screening with a mammogram is recommended biennially  for women age 54-69. Screening for cervical cancer with a pap smear and pelvic exam is recommended for women after age 72 years every 2 years up to age 79 or when the provider and patient decide to stop. If there is a history of cervical abnormalities or other increased risk for cancer then the test is recommended yearly. Hepatitis C screening is also recommended for anyone born between 80 through [address]. A shingles vaccine is also recommended once in a lifetime after age 61. Your Medicare Wellness Exam is recommended annually. Here is a list of your current Health Maintenance items with a due date:  Health Maintenance Due   Topic Date Due    DTaP/Tdap/Td  (1 - Tdap) 09[birth year]/1968    Annual Well Visit  04/26/2017            Osteoporosis: Care Instructions  Your Care Instructions    Osteoporosis causes bones to become thin and weak. It is much more common in women than in men. Osteoporosis may be very advanced before you know you have it.  Sometimes the first sign is a broken bone in the hip, spine, or wrist or sudden pain in your middle or lower back.  Follow-up care is a key part of your treatment and safety. Be sure to make and go to all appointments, and call your doctor if you are having problems. It's also a good idea to know your test results and keep a list of the medicines you take. How can you care for yourself at home? · Your doctor may prescribe a bisphosphonate, such as risedronate (Actonel) or alendronate (Fosamax), for osteoporosis. If you are taking one of these medicines by mouth:  ¨ Take your medicine with a full glass of water when you first get up in the morning. ¨ Do not lie down, eat, drink a beverage, or take any other medicine for at least 30 minutes after taking the drug. This helps prevent stomach problems. ¨ Do not take your medicine late in the day if you forgot to take it in the morning. Skip it, and take the usual dose the next morning. ¨ If you have side effects, tell your doctor. He or she may prescribe another medicine. · Get enough calcium and vitamin D. The [place] of Medicine recommends adults younger than age 46 need 1,000 mg of calcium and 600 IU of vitamin D each day. Women ages 46 to 79 need 1,200 mg of calcium and 600 IU of vitamin D each day. Men ages 46 to 79 need 1,000 mg of calcium and 600 IU of vitamin D each day. Adults 71 and older need 1,200 mg of calcium and 800 IU of vitamin D each day. ¨ Eat foods rich in calcium, like yogurt, cheese, milk, and dark green vegetables. This is a good way to get the calcium you need. You can get vitamin D from eggs, fatty fish, cereal, and milk. ¨ Talk to your doctor about taking a calcium plus vitamin D supplement. Be careful, though. Adults ages 23 to 48 should not get more than 2,500 mg of calcium and 4,000 IU of vitamin D each day, whether it is from supplements and/or food. Adults ages 46 and older should not get more than 2,000 mg of calcium and 4,000 IU of vitamin D each day from supplements and/or food.   · Limit alcohol to 2 drinks a day for men and 1 drink a day for women. Too much alcohol can cause health problems. · Do not smoke. Smoking puts you at a much higher risk for osteoporosis. If you need help quitting, talk to your doctor about stop-smoking programs and medicines. These can increase your chances of quitting for good. · Get regular bone-building exercise. Weight-bearing and resistance exercises keep bones healthy by working the muscles and bones against gravity. Start out at an exercise level that feels right for you. Add a little at a time until you can do the following:  ¨ Do 30 minutes of weight-bearing exercise on most days of the week. Walking, jogging, stair climbing, and dancing are good choices. ¨ Do resistance exercises with weights or elastic bands 2 to 3 days a week. · Reduce your risk of falls:  ¨ Wear supportive shoes with low heels and nonslip soles. ¨ Use a cane or walker, if you need it. Use shower chairs and bath benches. Put in handrails on stairways, around your shower or tub area, and near the toilet. ¨ Keep stairs, porches, and walkways well lit. Use night-lights. ¨ Remove throw rugs and other objects that are in the way. ¨ Avoid icy, wet, or slippery surfaces. ¨ Keep a cordless phone and a flashlight with new batteries by your bed. When should you call for help? Watch closely for changes in your health, and be sure to contact your doctor if you have any problems. Where can you learn more? Go to http://rad-batool.info/. Enter K100 in the search box to learn more about \"Osteoporosis: Care Instructions. \"  Current as of: August 9, 2016  Content Version: 11.3  © 0113-4360 [employer]. Care instructions adapted under license by [employer] (which disclaims liability or warranty for this information).  If you have questions about a medical condition or this instruction, always ask your healthcare professional. [address]  any warranty or liability for your use of this information. High Cholesterol: Care Instructions  Your Care Instructions  Cholesterol is a type of fat in your blood. It is needed for many body functions, such as making new cells. Cholesterol is made by your body. It also comes from food you eat. High cholesterol means that you have too much of the fat in your blood. This raises your risk of a heart attack and stroke. LDL and HDL are part of your total cholesterol. LDL is the \"bad\" cholesterol. High LDL can raise your risk for heart disease, heart attack, and stroke. HDL is the \"good\" cholesterol. It helps clear bad cholesterol from the body. High HDL is linked with a lower risk of heart disease, heart attack, and stroke. Your cholesterol levels help your doctor find out your risk for having a heart attack or stroke. You and your doctor can talk about whether you need to lower your risk and what treatment is best for you. A heart-healthy lifestyle along with medicines can help lower your cholesterol and your risk. The way you choose to lower your risk will depend on how high your risk is for heart attack and stroke. It will also depend on how you feel about taking medicines. Follow-up care is a key part of your treatment and safety. Be sure to make and go to all appointments, and call your doctor if you are having problems. It's also a good idea to know your test results and keep a list of the medicines you take. How can you care for yourself at home? · Eat a variety of foods every day. Good choices include fruits, vegetables, whole grains (like oatmeal), dried beans and peas, nuts and seeds, soy products (like tofu), and fat-free or low-fat dairy products. · Replace butter, margarine, and hydrogenated or partially hydrogenated oils with olive and canola oils. (Canola oil margarine without trans fat is fine.)  · Replace red meat with fish, poultry, and soy protein (like tofu).   · Limit processed and packaged foods like chips, crackers, and cookies. · Bake, broil, or steam foods. Don't purdy them. · Be physically active. Get at least 30 minutes of exercise on most days of the week. Walking is a good choice. You also may want to do other activities, such as running, swimming, cycling, or playing tennis or team sports. · Stay at a healthy weight or lose weight by making the changes in eating and physical activity listed above. Losing just a small amount of weight, even 5 to 10 pounds, can reduce your risk for having a heart attack or stroke. · Do not smoke. When should you call for help? Watch closely for changes in your health, and be sure to contact your doctor if:  · You need help making lifestyle changes. · You have questions about your medicine. Where can you learn more? Go to http://rad-batool.info/. Enter A859 in the search box to learn more about \"High Cholesterol: Care Instructions. \"  Current as of: April 3, 2017  Content Version: 11.3  © 4518-2523 [employer]. Care instructions adapted under license by [employer] (which disclaims liability or warranty for this information). If you have questions about a medical condition or this instruction, always ask your healthcare professional. [address] any warranty or liability for your use of this information. Advance Care Planning: Care Instructions  Your Care Instructions  It can be hard to live with an illness that cannot be cured. But if your health is getting worse, you may want to make decisions about end-of-life care. Planning for the end of your life does not mean that you are giving up. It is a way to make sure that your wishes are met. Clearly stating your wishes can make it easier for your loved ones. Making plans while you are still able may also ease your mind and make your final days less stressful and more meaningful. Follow-up care is a key part of your treatment and safety.  Be sure to make and go to all appointments, and call your doctor if you are having problems. It's also a good idea to know your test results and keep a list of the medicines you take. What can you do to plan for the end of life? · You can bring these issues up with your doctor. You do not need to wait until your doctor starts the conversation. You might start with \"I would not be willing to live with . [place] \" When you complete this sentence it helps your doctor understand your wishes. · Talk openly and honestly with your doctor. This is the best way to understand the decisions you will need to make as your health changes. Know that you can always change your mind. · Ask your doctor about commonly used life-support measures. These include tube feedings, breathing machines, and fluids given through a vein (IV). Understanding these treatments will help you decide whether you want them. · You may choose to have these life-supporting treatments for a limited time. This allows a trial period to see whether they will help you. You may also decide that you want your doctor to take only certain measures to keep you alive. It is important to spell out these conditions so that your doctor and family understand them. · Talk to your doctor about how long you are likely to live. He or she may be able to give you an idea of what usually happens with your specific illness. · Think about preparing papers that state your wishes. This way there will not be any confusion about what you want. You can change your instructions at any time. Which papers should you prepare? Advance directives are legal papers that tell doctors how you want to be cared for at the end of your life. You do not need a  to write these papers. Ask your doctor or your state health department for information on how to write your advance directives. They may have the forms for each of these types of papers.  Make sure your doctor has a copy of these on file, and give a copy to a family member or close friend. · Consider a do-not-resuscitate order (DNR). This order asks that no extra treatments be done if your heart stops or you stop breathing. Extra treatments may include cardiopulmonary resuscitation (CPR), electrical shock to restart your heart, or a machine to breathe for you. If you decide to have a DNR order, ask your doctor to explain and write it. Place the order in your home where everyone can easily see it. · Consider a living will. A living will explains your wishes about life support and other treatments at the end of your life if you become unable to speak for yourself. Living hernandez tell doctors to use or not use treatments that would keep you alive. You must have one or two witnesses or a notary present when you sign this form. · Consider a durable power of  for health care. This allows you to name a person to make decisions about your care if you are not able to. Most people ask a close friend or family member. Talk to this person about the kinds of treatments you want and those that you do not want. Make sure this person understands your wishes. These legal papers are simple to change. Tell your doctor what you want to change, and ask him or her to make a note in your medical file. Give your family updated copies of the papers. Where can you learn more? Go to http://rad-batool.info/. Enter P184 in the search box to learn more about \"Advance Care Planning: Care Instructions. \"  Current as of: November 17, 2016  Content Version: 11.3  © 0651-4151 [employer]. Care instructions adapted under license by [employer] (which disclaims liability or warranty for this information). If you have questions about a medical condition or this instruction, always ask your healthcare professional. [place] [address] any warranty or liability for your use of this information.       Vaccine Information Statement    Influenza (Flu) Vaccine (Inactivated or Recombinant): What you need to know    Many Vaccine Information Statements are available in [place] and other languages. See www.immunize.org/vis  Hojas de Información Sobre Vacunas están disponibles en Español y en muchos otros idiomas. Visite www.immunize.org/vis    1. Why get vaccinated? Influenza (flu) is a contagious disease that spreads around the [facility] every year, usually between October and May. Flu is caused by influenza viruses, and is spread mainly by coughing, sneezing, and close contact. Anyone can get flu. Flu strikes suddenly and can last several days. Symptoms vary by age, but can include:   fever/chills   sore throat   muscle aches   fatigue   cough   headache    runny or stuffy nose    Flu can also lead to pneumonia and blood infections, and cause diarrhea and seizures in children. If you have a medical condition, such as heart or lung disease, flu can make it worse. Flu is more dangerous for some people. Infants and young children, people 72years of age and older, pregnant women, and people with certain health conditions or a weakened immune system are at greatest risk. Each year thousands of people in the [facility] die from flu, and many more are hospitalized. Flu vaccine can:   keep you from getting flu,   make flu less severe if you do get it, and   keep you from spreading flu to your family and other people. 2. Inactivated and recombinant flu vaccines    A dose of flu vaccine is recommended every flu season. Children 6 months through 6years of age may need two doses during the same flu season. Everyone else needs only one dose each flu season. Some inactivated flu vaccines contain a very small amount of a mercury-based preservative called thimerosal. Studies have not shown thimerosal in vaccines to be harmful, but flu vaccines that do not contain thimerosal are available.     There is no live flu virus in flu shots. They cannot cause the flu. There are many flu viruses, and they are always changing. Each year a new flu vaccine is made to protect against three or four viruses that are likely to cause disease in the upcoming flu season. But even when the vaccine doesnt exactly match these viruses, it may still provide some protection    Flu vaccine cannot prevent:   flu that is caused by a virus not covered by the vaccine, or   illnesses that look like flu but are not. It takes about 2 weeks for protection to develop after vaccination, and protection lasts through the flu season. 3. Some people should not get this vaccine    Tell the person who is giving you the vaccine:     If you have any severe, life-threatening allergies. If you ever had a life-threatening allergic reaction after a dose of flu vaccine, or have a severe allergy to any part of this vaccine, you may be advised not to get vaccinated. Most, but not all, types of flu vaccine contain a small amount of egg protein.  If you ever had Guillain-Barré Syndrome (also called GBS). Some people with a history of GBS should not get this vaccine. This should be discussed with your doctor.  If you are not feeling well. It is usually okay to get flu vaccine when you have a mild illness, but you might be asked to come back when you feel better. 4. Risks of a vaccine reaction    With any medicine, including vaccines, there is a chance of reactions. These are usually mild and go away on their own, but serious reactions are also possible. Most people who get a flu shot do not have any problems with it. Minor problems following a flu shot include:    soreness, redness, or swelling where the shot was given     hoarseness   sore, red or itchy eyes   cough   fever   aches   headache   itching   fatigue  If these problems occur, they usually begin soon after the shot and last 1 or 2 days.      More serious problems following a flu shot can include the following:     There may be a small increased risk of Guillain-Barré Syndrome (GBS) after inactivated flu vaccine. This risk has been estimated at 1 or 2 additional cases per million people vaccinated. This is much lower than the risk of severe complications from flu, which can be prevented by flu vaccine.  Young children who get the flu shot along with pneumococcal vaccine (PCV13) and/or DTaP vaccine at the same time might be slightly more likely to have a seizure caused by fever. Ask your doctor for more information. Tell your doctor if a child who is getting flu vaccine has ever had a seizure. Problems that could happen after any injected vaccine:      People sometimes faint after a medical procedure, including vaccination. Sitting or lying down for about 15 minutes can help prevent fainting, and injuries caused by a fall. Tell your doctor if you feel dizzy, or have vision changes or ringing in the ears.  Some people get severe pain in the shoulder and have difficulty moving the arm where a shot was given. This happens very rarely.  Any medication can cause a severe allergic reaction. Such reactions from a vaccine are very rare, estimated at about 1 in a million doses, and would happen within a few minutes to a few hours after the vaccination. As with any medicine, there is a very remote chance of a vaccine causing a serious injury or death. The safety of vaccines is always being monitored. For more information, visit: www.cdc.gov/vaccinesafety/    5. What if there is a serious reaction? What should I look for?  Look for anything that concerns you, such as signs of a severe allergic reaction, very high fever, or unusual behavior.     Signs of a severe allergic reaction can include hives, swelling of the face and throat, difficulty breathing, a fast heartbeat, dizziness, and weakness  usually within a few minutes to a few hours after the vaccination. What should I do?  If you think it is a severe allergic reaction or other emergency that cant wait, call 9-1-1 and get the person to the nearest hospital. Otherwise, call your doctor.  Reactions should be reported to the Vaccine Adverse Event Reporting System (VAERS). Your doctor should file this report, or you can do it yourself through  the VAERS web site at www.vaers. [facility].gov, or by calling 5-766.615.3130. VAERS does not give medical advice. 6. The National Vaccine Injury Compensation Program    The [facility] Vaccine Injury Compensation Program (VICP) is a federal program that was created to compensate people who may have been injured by certain vaccines. Persons who believe they may have been injured by a vaccine can learn about the program and about filing a claim by calling 9-309.223.1706 or visiting the [employer] website at www.[facility].gov/vaccinecompensation. There is a time limit to file a claim for compensation. 7. How can I learn more?  Ask your healthcare provider. He or she can give you the vaccine package insert or suggest other sources of information.  Call your local or state health department.  Contact the Centers for Disease Control and Prevention (CDC):  - Call 5-601.663.5973 (1-800-CDC-INFO) or  - Visit CDCs website at www.cdc.gov/flu    Vaccine Information Statement   Inactivated Influenza Vaccine   8/7/2015  42 Gonzalo AlstonRadha Sample [id number]    Department of Health and Human Services  Centers for Disease Control and Prevention    Office Use Only      Vaccine Information Statement    Shingles Vaccine: What You Need to Know    Many Vaccine Information Statements are available in Yoruba and other languages. See [employer].si. 1. What is shingles? Shingles is a painful skin rash, often with blisters. It is also called Herpes Zoster, or just Zoster. A shingles rash usually appears on one side of the face or body and lasts from 2 to 4 weeks. Its main symptom is pain, which can be quite severe. Other symptoms of shingles can include fever, headache, chills and upset stomach. Very rarely, a shingles infection can lead to pneumonia, hearing problems, blindness, brain inflammation (encephalitis) or death. For about 1 person in 5, severe pain can continue even long after the rash clears up. This is called  post-herpetic neuralgia. Shingles is caused by the Varicella Zoster virus, the same virus that causes chickenpox. Only someone who has had chickenpox  or, rarely, has gotten chickenpox vaccine  can get shingles. The virus stays in your body, and can cause shingles many years later. You cant catch shingles from another person with shingles. However, a person who has never had chickenpox (or chickenpox vaccine) could get chickenpox from someone with shingles. This is not very common. Shingles is far more common in people 48years of age and older than in younger people. It is also more common in people whose immune systems are weakened because of a disease such as cancer, or drugs such as steroids or chemotherapy. At least 1 million people a year in the [facility] get shingles. 2. Shingles vaccine    A vaccine for shingles was licensed in 9692. In clinical trials, the vaccine reduced the risk of shingles by 50%. It can also reduce pain in people who still get shingles after being vaccinated. A single dose of shingles vaccine is recommended for adults 48years of age and older. 3. Some people should not get shingles vaccine or should wait    A person should not get shingles vaccine who:      has ever had a life-threatening allergic reaction to gelatin, the antibiotic neomycin, or any other component of  shingles vaccine. Tell your doctor if you have any severe allergies.      has a weakened immune system because of current:   - AIDS or another disease that affects the immune system,    - treatment with drugs that affect the immune system, such as prolonged use of high-dose steroids,    - cancer treatment such as radiation or chemotherapy,   - cancer affecting the bone marrow or lymphatic system, such as leukemia or     lymphoma. Ivan Thorne is pregnant, or might be pregnant. Women should not become pregnant until at least    4 weeks after getting shingles vaccine. Someone with a minor acute illness, such as a cold, may be vaccinated. But anyone with a moderate or severe acute illness should usually wait until they recover before getting the vaccine. This includes anyone with a temperature of 101.3° F or higher. 4. What are the risks from shingles vaccine? A vaccine, like any medicine, could  possibly cause serious problems, such as severe  allergic reactions. However, the risk of a vaccine causing serious harm, or death, is extremely small. No serious problems have been identified with shingles vaccine. Mild Problems      Redness, soreness, swelling, or itching  at the site of the injection (about 1person in 3).  Headache (about 1 person in 79). Like all vaccines, shingles vaccine is being closely monitored for unusual or severe problems. 5. What if there is a moderate or severe reaction? What should I look for? Any unusual condition, such as a severe allergic reaction or a high fever. If a severe allergic reaction occurred, it would be within a few minutes to an hour after the shot. Signs of a serious allergic reaction can include difficulty breathing, weakness, hoarseness or wheezing, a fast heart-beat, hives, dizziness, paleness, or swelling of the throat. What should I do?  Call a doctor, or get the person to a doctor right away.  Tell your doctor what happened, the date and time it happened, and when the vaccination was given.  Ask your provider to report the reaction by filing a Vaccine Adverse Event Reporting System (VAERS) form.  Or you can file this report through the [employer] website at www.[employer]VO. [employer].gov, or by calling 6-504.176.2570. [employer] does not provide medical advice. 6. How can I learn more?  Ask your doctor or other health care provider. They can give you the vaccine package insert or suggest other sources of information.  Contact the Centers for Disease Control and Prevention (CDC):     - Call 3-486.256.6690 (2-945-CKN-INFO)     - Visit the CDCs website at [address] (10/6/2009)         Department of Health and Human Services  Centers for Disease Control and Prevention      Medicare Wellness Visit, Female    The best way to live healthy is to have a healthy lifestyle by eating a well-balanced diet, exercising regularly, limiting alcohol and stopping smoking. Regular physical exams and screening tests are another way to keep healthy. Preventive exams provided by your health care provider can find health problems before they become diseases or illnesses. Preventive services including immunizations, screening tests, monitoring and exams can help you take care of your own health. All people over age 72 should have a pneumovax  and and a prevnar shot to prevent pneumonia. These are once in a lifetime unless you and your provider decide differently. All people over 65 should have a yearly flu shot and a tetanus vaccine every 10 years. A bone mass density to screen for osteoporosis or thinning of the bones should be done every 2 years after 65. Screening for diabetes mellitus with a blood sugar test should be done every year. Glaucoma is a disease of the eye due to increased ocular pressure that can lead to blindness and it should be done every year by an eye professional.    Cardiovascular screening tests that check for elevated lipids (fatty part of blood) which can lead to heart disease and strokes should be done every 5 years.     Colorectal screening that evaluates for blood or polyps in your colon should be done yearly as a stool test or every five years as a flexible sigmoidoscope or every 10 years as a colonoscopy up to age 76. Breast cancer screening with a mammogram is recommended biennially  for women age 54-69. Screening for cervical cancer with a pap smear and pelvic exam is recommended for women after age 72 years every 2 years up to age 79 or when the provider and patient decide to stop. If there is a history of cervical abnormalities or other increased risk for cancer then the test is recommended yearly. Hepatitis C screening is also recommended for anyone born between 80 through [address]. A shingles vaccine is also recommended once in a lifetime after age 61. Your Medicare Wellness Exam is recommended annually. Here is a list of your current Health Maintenance items with a due date: There are no preventive care reminders to display for this patient. Well Visit, Over 72: Care Instructions  Your Care Instructions  Physical exams can help you stay healthy. Your doctor has checked your overall health and may have suggested ways to take good care of yourself. He or she also may have recommended tests. At home, you can help prevent illness with healthy eating, regular exercise, and other steps. Follow-up care is a key part of your treatment and safety. Be sure to make and go to all appointments, and call your doctor if you are having problems. It's also a good idea to know your test results and keep a list of the medicines you take. How can you care for yourself at home? · Reach and stay at a healthy weight. This will lower your risk for many problems, such as obesity, diabetes, heart disease, and high blood pressure. · Get at least 30 minutes of exercise on most days of the week. Walking is a good choice. You also may want to do other activities, such as running, swimming, cycling, or playing tennis or team sports. · Do not smoke.  Smoking can make health problems worse. If you need help quitting, talk to your doctor about stop-smoking programs and medicines. These can increase your chances of quitting for good. · Protect your skin from too much sun. When you're outdoors from 10 a.m. to 4 p.m., stay in the shade or cover up with clothing and a hat with a wide brim. Wear sunglasses that block UV rays. Even when it's cloudy, put broad-spectrum sunscreen (SPF 30 or higher) on any exposed skin. · See a dentist one or two times a year for checkups and to have your teeth cleaned. · Wear a seat belt in the car. · Limit alcohol to 2 drinks a day for men and 1 drink a day for women. Too much alcohol can cause health problems. Follow your doctor's advice about when to have certain tests. These tests can spot problems early. For men and women  · Cholesterol. Your doctor will tell you how often to have this done based on your overall health and other things that can increase your risk for heart attack and stroke. · Blood pressure. Have your blood pressure checked during a routine doctor visit. Your doctor will tell you how often to check your blood pressure based on your age, your blood pressure results, and other factors. · Diabetes. Ask your doctor whether you should have tests for diabetes. · Vision. Experts recommend that you have yearly exams for glaucoma and other age-related eye problems. · Hearing. Tell your doctor if you notice any change in your hearing. You can have tests to find out how well you hear. · Colon cancer tests. Keep having colon cancer tests as your doctor recommends. You can have one of several types of tests. · Heart attack and stroke risk. At least every 4 to 6 years, you should have your risk for heart attack and stroke assessed. Your doctor uses factors such as your age, blood pressure, cholesterol, and whether you smoke or have diabetes to show what your risk for a heart attack or stroke is over the next 10 years. · Osteoporosis. Talk to your doctor about whether you should have a bone density test to find out whether you have thinning bones. Also ask your doctor about whether you should take calcium and vitamin D supplements. For women  · Pap test and pelvic exam. You may no longer need a Pap test. Talk with your doctor about whether to stop or continue to have Pap tests. · Breast exam and mammogram. Ask how often you should have a mammogram, which is an X-ray of your breasts. A mammogram can spot breast cancer before it can be felt and when it is easiest to treat. · Thyroid disease. Talk to your doctor about whether to have your thyroid checked as part of a regular physical exam. Women have an increased chance of a thyroid problem. For men  · Prostate exam. Talk to your doctor about whether you should have a blood test (called a PSA test) for prostate cancer. Experts disagree on whether men should have this test. Some experts recommend that you discuss the benefits and risks of the test with your doctor. · Abdominal aortic aneurysm. Ask your doctor whether you should have a test to check for an aneurysm. You may need a test if you ever smoked or if your parent, brother, sister, or child has had an aneurysm. When should you call for help? Watch closely for changes in your health, and be sure to contact your doctor if you have any problems or symptoms that concern you. Where can you learn more? Go to http://rad-batool.info/. Enter M285 in the search box to learn more about \"Well Visit, Over 65: Care Instructions. \"  Current as of: July 19, 2016  Content Version: 11.3  © 3592-9224 [employer]. Care instructions adapted under license by [employer] (which disclaims liability or warranty for this information).  If you have questions about a medical condition or this instruction, always ask your healthcare professional. [place] [address] any warranty or liability for your use of this information. Medicare Part B Preventive Services Limitations Recommendation Scheduled   Bone Mass Measurement  (age 72 & older, biennial) Requires diagnosis related to osteoporosis or estrogen deficiency. Biennial benefit unless patient has history of long-term glucocorticoid tx or baseline is needed because initial test was by other method  7-15-15   Cardiovascular Screening Blood Tests (every 5 years)  Total cholesterol, HDL, Triglycerides Order as a panel if possible  10-18-17   Colorectal Cancer Screening  -Fecal occult blood test (annual)  -Flexible sigmoidoscopy (5y)  -Screening colonoscopy (10y)  -Barium Enema   5-1-15   Counseling to Prevent Tobacco Use (up to 8 sessions per year)  - Counseling greater than 3 and up to 10 minutes  - Counseling greater than 10 minutes Patients must be asymptomatic of tobacco-related conditions to receive as preventive service  n/a   Diabetes Screening Tests (at least every 3 years, Medicare covers annually or at 6-month intervals for prediabetic patients)    Fasting blood sugar (FBS) or glucose tolerance test (GTT) Patient must be diagnosed with one of the following:  -Hypertension, Dyslipidemia, obesity, previous impaired FBS or GTT  Or any two of the following: overweight, FH of diabetes, age ? 72, history of gestational diabetes, birth of baby weighing more than 9 pounds  n/a   Diabetes Self-Management Training (DSMT) (no USPSTF recommendation) Requires referral by treating physician for patient with diabetes or renal disease. 10 hours of initial DSMT session of no less than 30 minutes each in a continuous 12-month period. 2 hours of follow-up DSMT in subsequent years.      Glaucoma Screening (no USPSTF recommendation) Diabetes mellitus, family history, , age 48 or over,  American, age 72 or over  4-28-16   Human Immunodeficiency Virus (HIV) Screening (annually for increased risk patients)  HIV-1 and HIV-2 by EIA, AGNIESZKA, rapid antibody test, or oral mucosa transudate Patient must be at increased risk for HIV infection per USPSTF guidelines or pregnant. Tests covered annually for patients at increased risk. Pregnant patients may receive up to 3 test during pregnancy. Medical Nutrition Therapy (MNT) (for diabetes or renal disease not recommended schedule) Requires referral by treating physician for patient with diabetes or renal disease. Can be provided in same year as diabetes self-management training (DSMT), and CMS recommends medical nutrition therapy take place after DSMT. Up to 3 hours for initial year and 2 hours in subsequent years. Shingles Vaccination A shingles vaccine is also recommended once in a lifetime after age 61  ordered   Seasonal Influenza Vaccination (annually)   25/ 10/2017   Pneumococcal Vaccination (once after 72)   Next visit   Hepatitis B Vaccinations (if medium/high risk) Medium/high risk factors:  End-stage renal disease,  Hemophiliacs who received Factor VIII or IX concentrates, Clients of institutions for the mentally retarded, Persons who live in the same house as a HepB virus carrier, Homosexual men, Illicit injectable drug abusers. Screening Mammography (biennial age 54-69) Annually (age 36 or over)  5-22-17   Screening Pap Tests and Pelvic Examination (up to age 79 and after 79 if unknown history or abnormal study last 10 years) Every 25 months except high risk  n/a   Ultrasound Screening for Abdominal Aortic Aneurysm (AAA) (once) Patient must be referred through IPPE and not have had a screening for abdominal aortic aneurysm before under Medicare.   Limited to patients who meet one of the following criteria:  - Men who are 73-68 years old and have smoked more than 100 cigarettes in their lifetime.  -Anyone with a FH of AAA  -Anyone recommended for screening by USPSTF  n/a

## 2017-10-30 RX ORDER — METOPROLOL SUCCINATE 100 MG/1
TABLET, EXTENDED RELEASE ORAL
Qty: 30 TAB | Refills: 0 | Status: SHIPPED | OUTPATIENT
Start: 2017-10-30 | End: 2017-12-19 | Stop reason: SDUPTHER

## 2017-10-31 ENCOUNTER — HOSPITAL ENCOUNTER (OUTPATIENT)
Dept: BONE DENSITY | Age: 72
Discharge: HOME | End: 2017-10-31
Attending: PHYSICIAN ASSISTANT
Payer: MEDICARE

## 2017-10-31 DIAGNOSIS — Z78.0 POST-MENOPAUSE: ICD-10-CM

## 2017-10-31 PROCEDURE — 77080 DXA BONE DENSITY AXIAL: CPT

## 2017-10-31 NOTE — PROGRESS NOTES
Please let patient know that her imaging shows that her bone is still considered \"thinning\" but that it has increased in mass, this is good news! So, keep taking the Vitamin D with the calcium and make sure she is getting at least 30 minutes daily of walking or weight lifting (weight bearing activity) to help increase bone density.

## 2017-11-01 ENCOUNTER — HOSPITAL ENCOUNTER (OUTPATIENT)
Dept: LAB | Age: 72
Discharge: HOME | End: 2017-11-01
Payer: MEDICARE

## 2017-11-01 DIAGNOSIS — Z12.11 COLON CANCER SCREENING: ICD-10-CM

## 2017-11-01 PROCEDURE — 82274 ASSAY TEST FOR BLOOD FECAL: CPT | Performed by: PHYSICIAN ASSISTANT

## 2017-11-02 ENCOUNTER — TELEPHONE (OUTPATIENT)
Dept: FAMILY MEDICINE CLINIC | Age: 72
End: 2017-11-02

## 2017-11-02 NOTE — TELEPHONE ENCOUNTER
----- Message from Merlinda Fontana, PA-C sent at 10/31/2017  5:07 PM EDT -----  Please let patient know that her imaging shows that her bone is still considered \"thinning\" but that it has increased in mass, this is good news! So, keep taking the Vitamin D with the calcium and make sure she is getting at least 30 minutes daily of walking or weight lifting (weight bearing activity) to help increase bone density.

## 2017-11-08 LAB — HEMOCCULT STL QL IA: NEGATIVE

## 2017-11-08 NOTE — PROGRESS NOTES
Great news! Your test to see if there is blood in your stool, is negative. We should repeat this test annually.

## 2017-12-19 RX ORDER — METOPROLOL SUCCINATE 100 MG/1
TABLET, EXTENDED RELEASE ORAL
Qty: 30 TAB | Refills: 0 | Status: SHIPPED | OUTPATIENT
Start: 2017-12-19 | End: 2018-01-19 | Stop reason: SDUPTHER

## 2018-01-11 RX ORDER — HYDROCHLOROTHIAZIDE 12.5 MG/1
TABLET ORAL
Qty: 90 TAB | Refills: 0 | Status: SHIPPED | OUTPATIENT
Start: 2018-01-11 | End: 2018-04-19 | Stop reason: SDUPTHER

## 2018-01-17 ENCOUNTER — LAB ONLY (OUTPATIENT)
Dept: FAMILY MEDICINE CLINIC | Age: 73
End: 2018-01-17

## 2018-01-17 ENCOUNTER — HOSPITAL ENCOUNTER (OUTPATIENT)
Dept: LAB | Age: 73
Discharge: HOME | End: 2018-01-17

## 2018-01-17 DIAGNOSIS — E55.9 VITAMIN D DEFICIENCY: ICD-10-CM

## 2018-01-17 DIAGNOSIS — I10 ESSENTIAL HYPERTENSION, BENIGN: ICD-10-CM

## 2018-01-17 DIAGNOSIS — E78.00 PURE HYPERCHOLESTEROLEMIA: Primary | ICD-10-CM

## 2018-01-17 PROCEDURE — 99001 SPECIMEN HANDLING PT-LAB: CPT | Performed by: PHYSICIAN ASSISTANT

## 2018-01-18 LAB
25(OH)D3+25(OH)D2 SERPL-MCNC: 61.8 NG/ML (ref 30–100)
ALBUMIN SERPL-MCNC: 3.7 G/DL (ref 3.5–4.8)
ALBUMIN/GLOB SERPL: 1.4 {RATIO} (ref 1.2–2.2)
ALP SERPL-CCNC: 49 IU/L (ref 39–117)
ALT SERPL-CCNC: 12 IU/L (ref 0–32)
AST SERPL-CCNC: 14 IU/L (ref 0–40)
BASOPHILS # BLD AUTO: 0 X10E3/UL (ref 0–0.2)
BASOPHILS NFR BLD AUTO: 1 %
BILIRUB SERPL-MCNC: 0.3 MG/DL (ref 0–1.2)
BUN SERPL-MCNC: 13 MG/DL (ref 8–27)
BUN/CREAT SERPL: 15 (ref 12–28)
CALCIUM SERPL-MCNC: 9.2 MG/DL (ref 8.7–10.3)
CHLORIDE SERPL-SCNC: 98 MMOL/L (ref 96–106)
CHOLEST SERPL-MCNC: 208 MG/DL (ref 100–199)
CO2 SERPL-SCNC: 25 MMOL/L (ref 18–29)
CREAT SERPL-MCNC: 0.87 MG/DL (ref 0.57–1)
EOSINOPHIL # BLD AUTO: 0.2 X10E3/UL (ref 0–0.4)
EOSINOPHIL NFR BLD AUTO: 3 %
ERYTHROCYTE [DISTWIDTH] IN BLOOD BY AUTOMATED COUNT: 14.2 % (ref 12.3–15.4)
GLOBULIN SER CALC-MCNC: 2.7 G/DL (ref 1.5–4.5)
GLUCOSE SERPL-MCNC: 82 MG/DL (ref 65–99)
HCT VFR BLD AUTO: 41.4 % (ref 34–46.6)
HDLC SERPL-MCNC: 54 MG/DL
HGB BLD-MCNC: 13 G/DL (ref 11.1–15.9)
IMM GRANULOCYTES # BLD: 0 X10E3/UL (ref 0–0.1)
IMM GRANULOCYTES NFR BLD: 0 %
LDLC SERPL CALC-MCNC: 141 MG/DL (ref 0–99)
LYMPHOCYTES # BLD AUTO: 1.8 X10E3/UL (ref 0.7–3.1)
LYMPHOCYTES NFR BLD AUTO: 30 %
MCH RBC QN AUTO: 26.1 PG (ref 26.6–33)
MCHC RBC AUTO-ENTMCNC: 31.4 G/DL (ref 31.5–35.7)
MCV RBC AUTO: 83 FL (ref 79–97)
MONOCYTES # BLD AUTO: 0.6 X10E3/UL (ref 0.1–0.9)
MONOCYTES NFR BLD AUTO: 9 %
NEUTROPHILS # BLD AUTO: 3.5 X10E3/UL (ref 1.4–7)
NEUTROPHILS NFR BLD AUTO: 57 %
PLATELET # BLD AUTO: 286 X10E3/UL (ref 150–379)
POTASSIUM SERPL-SCNC: 3.9 MMOL/L (ref 3.5–5.2)
PROT SERPL-MCNC: 6.4 G/DL (ref 6–8.5)
RBC # BLD AUTO: 4.98 X10E6/UL (ref 3.77–5.28)
SODIUM SERPL-SCNC: 139 MMOL/L (ref 134–144)
TRIGL SERPL-MCNC: 63 MG/DL (ref 0–149)
TSH SERPL DL<=0.005 MIU/L-ACNC: 1.5 UIU/ML (ref 0.45–4.5)
VLDLC SERPL CALC-MCNC: 13 MG/DL (ref 5–40)
WBC # BLD AUTO: 6 X10E3/UL (ref 3.4–10.8)

## 2018-01-19 RX ORDER — METOPROLOL SUCCINATE 100 MG/1
TABLET, EXTENDED RELEASE ORAL
Qty: 30 TAB | Refills: 0 | Status: SHIPPED | OUTPATIENT
Start: 2018-01-19 | End: 2018-02-21 | Stop reason: SDUPTHER

## 2018-01-24 ENCOUNTER — OFFICE VISIT (OUTPATIENT)
Dept: FAMILY MEDICINE CLINIC | Age: 73
End: 2018-01-24

## 2018-01-24 VITALS
BODY MASS INDEX: 28.14 KG/M2 | SYSTOLIC BLOOD PRESSURE: 134 MMHG | TEMPERATURE: 98.1 F | WEIGHT: 158.8 LBS | DIASTOLIC BLOOD PRESSURE: 84 MMHG | HEART RATE: 76 BPM | RESPIRATION RATE: 16 BRPM | HEIGHT: 63 IN | OXYGEN SATURATION: 98 %

## 2018-01-24 DIAGNOSIS — E78.00 PURE HYPERCHOLESTEROLEMIA: ICD-10-CM

## 2018-01-24 DIAGNOSIS — M85.80 OSTEOPENIA, UNSPECIFIED LOCATION: ICD-10-CM

## 2018-01-24 DIAGNOSIS — K21.9 GASTROESOPHAGEAL REFLUX DISEASE WITHOUT ESOPHAGITIS: ICD-10-CM

## 2018-01-24 DIAGNOSIS — F43.9 SITUATIONAL STRESS: ICD-10-CM

## 2018-01-24 DIAGNOSIS — E66.3 OVERWEIGHT (BMI 25.0-29.9): ICD-10-CM

## 2018-01-24 DIAGNOSIS — I10 ESSENTIAL HYPERTENSION, BENIGN: Primary | ICD-10-CM

## 2018-01-24 DIAGNOSIS — E55.9 VITAMIN D DEFICIENCY: ICD-10-CM

## 2018-01-24 PROBLEM — M85.89 OSTEOPENIA OF MULTIPLE SITES: Status: ACTIVE | Noted: 2018-01-24

## 2018-01-24 RX ORDER — SIMVASTATIN 5 MG/1
5 TABLET, FILM COATED ORAL
Qty: 30 TAB | Refills: 1 | Status: SHIPPED | OUTPATIENT
Start: 2018-01-24 | End: 2018-04-15 | Stop reason: SDUPTHER

## 2018-01-24 NOTE — PATIENT INSTRUCTIONS
Hyperlipidemia: After Your Visit  Your Care Instructions  Hyperlipidemia is too much fat in your blood. The body has several kinds of fat, including cholesterol and triglycerides. Your body needs fat for many things, such as making new cells. But too much fat in your blood increases your chances of having a heart attack or stroke. You may be able to lower your cholesterol and triglycerides with a heart-healthy diet, exercise, and if needed, medicine. Your doctor may want you to try lifestyle changes first to see whether they lower the fat in your blood. You may need to take medicine if lifestyle changes do not lower the fat in your blood enough. Follow-up care is a key part of your treatment and safety. Be sure to make and go to all appointments, and call your doctor if you are having problems. Its also a good idea to know your test results and keep a list of the medicines you take. How can you care for yourself at home? Take your medicines  · Take your medicines exactly as prescribed. Call your doctor if you think you are having a problem with your medicine. · If you take medicine to lower your cholesterol, go to follow-up visits. You will need to have blood tests. · Do not take large doses of niacin, which is a B vitamin, while taking medicine called statins. It may increase the chance of muscle pain and liver problems. · Talk to your doctor about avoiding grapefruit juice if you are taking statins. Grapefruit juice can raise the level of this medicine in your blood. This could increase side effects. Eat more fruits, vegetables, and fiber  · Fruits and vegetables have lots of nutrients that help protect against heart disease, and they have little--if any--fat. Try to eat at least five servings a day. Dark green, deep orange, or yellow fruits and vegetables are healthy choices. · Keep carrots, celery, and other veggies handy for snacks.  Buy fruit that is in season and store it where you can see it so that you will be tempted to eat it. Cook dishes that have a lot of veggies in them, such as stir-fries and soups. · Foods high in fiber may reduce your cholesterol and provide important vitamins and minerals. High-fiber foods include whole-grain cereals and breads, oatmeal, beans, brown rice, citrus fruits, and apples. · Buy whole-grain breads and cereals instead of white bread and pastries. Limit saturated fat  · Read food labels and try to avoid saturated fat and trans fat. They increase your risk of heart disease. · Use olive or canola oil when you cook. Try cholesterol-lowering spreads, such as Benecol or Take Control. · Bake, broil, grill, or steam foods instead of frying them. · Limit the amount of high-fat meats you eat, including hot dogs and sausages. Cut out all visible fat when you prepare meat. · Eat fish, skinless poultry, and soy products such as tofu instead of high-fat meats. Soybeans may be especially good for your heart. Eat at least two servings of fish a week. Certain fish, such as salmon, contain omega-3 fatty acids, which may help reduce your risk of heart attack. · Choose low-fat or fat-free milk and dairy products. Get exercise, limit alcohol, and quit smoking  · Get more exercise. Work with your doctor to set up an exercise program. Even if you can do only a small amount, exercise will help you get stronger, have more energy, and manage your weight and your stress. Walking is an easy way to get exercise. Gradually increase the amount you walk every day. Aim for at least 30 minutes on most days of the week. You also may want to swim, bike, or do other activities. · Limit alcohol to no more than 2 drinks a day for men and 1 drink a day for women. · Do not smoke. If you need help quitting, talk to your doctor about stop-smoking programs and medicines. These can increase your chances of quitting for good. When should you call for help?   Call 911 anytime you think you may need emergency care. For example, call if:  · You have symptoms of a heart attack. These may include:  ¨ Chest pain or pressure, or a strange feeling in the chest.  ¨ Sweating. ¨ Shortness of breath. ¨ Nausea or vomiting. ¨ Pain, pressure, or a strange feeling in the back, neck, jaw, or upper belly or in one or both shoulders or arms. ¨ Lightheadedness or sudden weakness. ¨ A fast or irregular heartbeat. After you call 911, the  may tell you to chew 1 adult-strength or 2 to 4 low-dose aspirin. Wait for an ambulance. Do not try to drive yourself. · You have signs of a stroke. These may include:  ¨ Sudden numbness, paralysis, or weakness in your face, arm, or leg, especially on only one side of your body. ¨ New problems with walking or balance. ¨ Sudden vision changes. ¨ Drooling or slurred speech. ¨ New problems speaking or understanding simple statements, or feeling confused. ¨ A sudden, severe headache that is different from past headaches. · You passed out (lost consciousness). Call your doctor now or seek immediate medical care if:  · You have muscle pain or weakness. Watch closely for changes in your health, and be sure to contact your doctor if:  · You are very tired. · You have an upset stomach, gas, constipation, or belly pain or cramps. Where can you learn more? Go to [employer]  Enter C406 in the search box to learn more about \"Hyperlipidemia: After Your Visit. \"   © 9866-0221 Healthwise, Inc[employer]. Care instructions adapted under license by [employer] (which disclaims liability or warranty for this information). This care instruction is for use with your licensed healthcare professional. If you have questions about a medical condition or this instruction, always ask your healthcare professional. [place] [address] any warranty or liability for your use of this information.   Content Version: 9.8.006093; Last Revised: October 13, 2011                 Dementia: Care Instructions  Your Care Instructions    Dementia is a loss of mental skills that affects your daily life. It is different than the occasional trouble with memory that is part of aging. You may find it hard to remember things that you feel you should be able to remember. Or you may feel that your mind is just not working as well as usual.  Finding out that you have dementia is a shock. You may be afraid and worried about how the condition will change your life. Although there is no cure at this time, medicine may slow memory loss and improve thinking for a while. Other medicines may be able to help you sleep or cope with depression and behavior changes. Dementia often gets worse slowly. But it can get worse quickly. As dementia gets worse, it may become harder to do common things that take planning, like making a list and going shopping. Over time, the disease may make it hard for you to take care of yourself. Some people with dementia need others to help care for them. Dementia is different for everyone. You may be able to function well for a long time. In the early stage of the condition, you can do things at home to make life easier and safer. You also can keep doing your hobbies and other activities. Many people find comfort in planning now for their future needs. Follow-up care is a key part of your treatment and safety. Be sure to make and go to all appointments, and call your doctor if you are having problems. It's also a good idea to know your test results and keep a list of the medicines you take. How can you care for yourself at home? · Take your medicines exactly as prescribed. Call your doctor if you think you are having a problem with your medicine. · Eat healthy foods. Eat lots of whole grains, fruits, and vegetables every day.  If you are not hungry, try snacks or nutritional drinks such as Boost, Ensure, or Sustacal.  · If you have problems sleeping:  ¨ Try not to nap too close to your bedtime. ¨ Exercise regularly. Walking is a good choice. ¨ Try a glass of warm milk or caffeine-free herbal tea before bed. · Do tasks and activities during the time of day when you feel your best. It may help to develop a daily routine. · Post labels, lists, and sticky notes to help you remember things. Write your activities on a calendar you can easily find. Put your clock where you can easily see it. · Stay active. Take walks in familiar places, or with friends or loved ones. Try to stay active mentally too. Read and work crossword puzzles if you enjoy these activities. · Do not drive unless you can pass an on-road driving test. If you are not sure if you are safe to drive, your state 's license bureau can test you. · Keep a cordless phone and a flashlight with new batteries by your bed. If possible, put a phone in each of the main rooms of your house, or carry a cell phone in case you fall and cannot reach a phone. Or, you can wear a device around your neck or wrist. You push a button that sends a signal for help. Acknowledge your emotions and plan for the future  · Talk openly and honestly with your doctor. · Let yourself grieve. It is common to feel angry, scared, frustrated, anxious, or depressed. · Get emotional support from family, friends, a support group, or a counselor experienced in working with people who have dementia. · Ask for help if you need it. · Plan for the future. ¨ Talk to your family and doctor about preparing a living will and other important papers while you can make decisions. These papers tell your doctors how to care for you at the end of your life. ¨ Consider naming a person to make decisions about your care if you are not able to. When should you call for help? Call 911 anytime you think you may need emergency care. For example, call if:  ? · You are lost and do not know whom to call. ? · You are injured and do not know whom to call. ?Call your doctor now or seek immediate medical care if:  ? · You are more confused or upset than usual.   ? · You feel like you could hurt yourself because your mind is not working well. ? Watch closely for changes in your health, and be sure to contact your doctor if you have any problems. Where can you learn more? Go to http://rad-batool.info/. Enter V025 in the search box to learn more about \"Dementia: Care Instructions. \"  Current as of: May 12, 2017  Content Version: 11.4  © 8300-7210 [employer]. Care instructions adapted under license by [email or web address] (which disclaims liability or warranty for this information). If you have questions about a medical condition or this instruction, always ask your healthcare professional. [address] any warranty or liability for your use of this information. Learning About the [address] [facility] [employer] Diet  What is the Mediterranean diet? The Mediterranean diet is a style of eating rather than a diet plan. It features foods eaten in [place], [place], [place] and [place], and other countries along the [facility]. It emphasizes eating foods like fish, fruits, vegetables, beans, high-fiber breads and whole grains, nuts, and olive oil. This style of eating includes limited red meat, cheese, and sweets. Why choose the Mediterranean diet? A Mediterranean-style diet may improve heart health. It contains more fat than other heart-healthy diets. But the fats are mainly from nuts, unsaturated oils (such as fish oils and olive oil), and certain nut or seed oils (such as canola, soybean, or flaxseed oil). These fats may help protect the heart and blood vessels. How can you get started on the Mediterranean diet? Here are some things you can do to switch to a more Mediterranean way of eating.   What to eat  · Eat a variety of fruits and vegetables each day, such as grapes, blueberries, tomatoes, broccoli, peppers, figs, olives, spinach, eggplant, beans, lentils, and chickpeas. · Eat a variety of whole-grain foods each day, such as oats, brown rice, and whole wheat bread, pasta, and couscous. · Eat fish at least 2 times a week. Try tuna, salmon, mackerel, lake trout, herring, or sardines. · Eat moderate amounts of low-fat dairy products, such as milk, cheese, or yogurt. · Eat moderate amounts of poultry and eggs. · Choose healthy (unsaturated) fats, such as nuts, olive oil, and certain nut or seed oils like canola, soybean, and flaxseed. · Limit unhealthy (saturated) fats, such as butter, palm oil, and coconut oil. And limit fats found in animal products, such as meat and dairy products made with whole milk. Try to eat red meat only a few times a month in very small amounts. · Limit sweets and desserts to only a few times a week. This includes sugar-sweetened drinks like soda. The Mediterranean diet may also include red wine with your meal-1 glass each day for women and up to 2 glasses a day for men. Tips for eating at home  · Use herbs, spices, garlic, lemon zest, and citrus juice instead of salt to add flavor to foods. · Add avocado slices to your sandwich instead of falcon. · Have fish for lunch or dinner instead of red meat. Brush the fish with olive oil, and broil or grill it. · Sprinkle your salad with seeds or nuts instead of cheese. · Cook with olive or canola oil instead of butter or oils that are high in saturated fat. · Switch from 2% milk or whole milk to 1% or fat-free milk. · Dip raw vegetables in a vinaigrette dressing or hummus instead of dips made from mayonnaise or sour cream.  · Have a piece of fruit for dessert instead of a piece of cake. Try baked apples, or have some dried fruit. Tips for eating out  · Try broiled, grilled, baked, or poached fish instead of having it fried or breaded. · Ask your  to have your meals prepared with olive oil instead of butter.   · Order dishes made with marinara sauce or sauces made from olive oil. Avoid sauces made from cream or mayonnaise. · Choose whole-grain breads, whole wheat pasta and pizza crust, brown rice, beans, and lentils. · Cut back on butter or margarine on bread. Instead, you can dip your bread in a small amount of olive oil. · Ask for a side salad or grilled vegetables instead of french fries or chips. Where can you learn more? Go to http://rad-batool.info/. Enter [phone] in the search box to learn more about \"Learning About the Mediterranean Diet. \"  Current as of: May 12, 2017  Content Version: 11.4  © 3737-7201 Healthwise, [employer]. Care instructions adapted under license by [employer] (which disclaims liability or warranty for this information). If you have questions about a medical condition or this instruction, always ask your healthcare professional. [place] [address] any warranty or liability for your use of this information.

## 2018-01-24 NOTE — PROGRESS NOTES
HPI:    Isaiah Olivo  is a 81 y.o.  female  patient who comes in today for routine care and results of lab work. She voices no complaints except her difficulty with her 's dementia. She has had no changes in medical health since last seen in the office. She has had no family health changes. Patient states [place] not exercise regularly right now due to the cold weather. Patient states that  She does not follow a particular diet plan. Patient states that She is compliant with medications. She has gained 12 pounds in a year. Patient denies SOB, CP, dizziness, headache. Current Outpatient Prescriptions   Medication Sig Dispense Refill    simvastatin (ZOCOR) 5 mg tablet Take 1 Tab by mouth nightly. Indications: hypercholesterolemia 30 Tab 1    metoprolol succinate (TOPROL-XL) 100 mg tablet TAKE ONE TABLET BY MOUTH ONCE DAILY 30 Tab 0    hydroCHLOROthiazide (HYDRODIURIL) 12.5 mg tablet TAKE ONE TABLET BY MOUTH ONCE DAILY 90 Tab 0    omeprazole (PRILOSEC) 20 mg capsule Take 20 mg by mouth daily.  omega-3 fatty acids-vitamin e (FISH OIL) 1,000 mg Cap Take 1 Cap by mouth daily.  Cholecalciferol, Vitamin D3, (VITAMIN D) 2,000 unit Cap Take  by mouth daily.  aspirin delayed-release 81 mg tablet Take 81 mg by mouth every other day.         Allergies   Allergen Reactions    Naproxen Palpitations      Past Medical History:   Diagnosis Date    Fibrocystic disease of breast     GERD (gastroesophageal reflux disease)     Hx of dizziness     Hypercholesterolemia     Hypertension     PND (paroxysmal nocturnal dyspnea)     Evaluated by Dr. Patience Suazo (March 2012)    Vaginitis atrophic 7-29-09    Vitamin D deficiency 4-2009      Family History   Problem Relation Age of Onset    Hypertension Mother     Arthritis-osteo Mother     Heart Attack Father     Hypertension Sister     High Cholesterol Sister     Hypertension Brother       Patient Active Problem List   Diagnosis Code    Essential hypertension, benign I10    Pure hypercholesterolemia E78.00    GERD (gastroesophageal reflux disease) K21.9    Vitamin D deficiency E55.9    PND (paroxysmal nocturnal dyspnea) R06.00    Pain in heel M79.673    Situational stress F43.9    Influenza vaccination declined Z28.21    23-polyvalent pneumococcal polysaccharide vaccine declined Z28.21    Overweight (BMI 25.0-29. 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 HENT: Negative for hearing loss. Eyes: Negative for blurred vision and double vision. Respiratory: Negative for shortness of breath. Cardiovascular: Negative for chest pain and palpitations. Gastrointestinal: Negative for abdominal pain, blood in stool, constipation, diarrhea, heartburn, nausea and vomiting. Genitourinary: Negative for dysuria and urgency. Musculoskeletal: Positive for falls ( one event, no fractures). Neurological: Negative for dizziness and headaches. Psychiatric/Behavioral: Negative for depression and memory loss. The patient is not nervous/anxious. Visit Vitals    /84    Pulse 76    Temp 98.1 °F (36.7 °C) (Oral)    Resp 16    Ht 5' 3\" (1.6 m)    Wt 158 lb 12.8 oz (72 kg)    SpO2 98%    BMI 28.13 kg/m2        Physical Exam   Constitutional: She is oriented to person, place, and time and well-developed, well-nourished, and in no distress. HENT:   Head: Normocephalic and atraumatic. Eyes: Conjunctivae are normal.   Neck: Normal range of motion. Neck supple. No thyromegaly present. Cardiovascular: Normal rate, regular rhythm, normal heart sounds and intact distal pulses. Pulmonary/Chest: Effort normal and breath sounds normal.   Abdominal: Soft. Bowel sounds are normal. She exhibits no distension and no mass. There is no tenderness. Musculoskeletal: She exhibits no edema. Lymphadenopathy:     She has no cervical adenopathy. Neurological: She is alert and oriented to person, place, and time. Vitals reviewed. There are no preventive care reminders to display for this patient. Assessment/Plan:    Diagnoses and all orders for this visit:    1. Essential hypertension, benign - stable, cont current regimen    2. Pure hypercholesterolemia - patient wants to take 6 weeks to change her diet but has also agreed to start very low dose statin. She has had difficulty tolerating them in the past.  Recommend reducing sat fats, increasing fiber, exercise. -     simvastatin (ZOCOR) 5 mg tablet; Take 1 Tab by mouth nightly. Indications: hypercholesterolemia    3. Gastroesophageal reflux disease without esophagitis - stable, cont current regimen    4. Vitamin D deficiency - patient to supplement with vitamin d    5. Situational stress - dealing with her 's dementia is taking a toll on her but she declines Rx management at this time. 6. Overweight (BMI 25.0-29.9) - recommended Mediterranean diet, exercise. 7.  Osteopenia - vitamin d, calcium, weight bearing activity. Information given. Follow-up Disposition:  Return in about 6 weeks (around 3/7/2018) for labs one week prior. Additional Notes: Discussed today's diagnosis, treatment plans. Discussed medication indications and side effects. Preventive Medicine:   Weight Management:  Mediterranean diet recommended as well as exercise for 30 minutes daily most days of the week. DASH diet info given. After Visit Summary: Provided and discussed printed patient instructions. Answered all questions and patient acknowledged understanding. Jose Olivia PA-C     I reviewed the patient's medical history, the physician assistant's findings on physical examination, the patient's diagnoses, and treatment plan as documented in the progress note. I concur with the treatment plan as documented.  There are no additional recommendations at this time.     Cally Hurt MD

## 2018-01-24 NOTE — MR AVS SNAPSHOT
[facility] 
 
 
 [address] [id number] [place] [id number] 
[phone] Patient: Bailey Salazar MRN: [id number] IDI:[id number] Visit Information Date & Time Provider Department Dept. Phone Encounter #  
 1/24/2018  8:30 AM Mora Simon PA-C [address] [phone] [id number] Follow-up Instructions Return in about 6 weeks (around 3/7/2018) for labs one week prior. Upcoming Health Maintenance Date Due Pneumococcal 65+ Low/Medium Risk (2 of 2 - PPSV23) 4/26/2018 GLAUCOMA SCREENING Q2Y 4/28/2018 MEDICARE YEARLY EXAM 10/26/2018 BREAST CANCER SCRN MAMMOGRAM 5/22/2019 COLONOSCOPY 5/1/2025 DTaP/Tdap/Td series (2 - Td) 1/24/2028 Allergies as of 1/24/2018  Review Complete On: 1/24/2018 By: Mora Simon PA-C Severity Noted Reaction Type Reactions Naproxen  04/06/2010    Palpitations Current Immunizations  Reviewed on 6/26/2014 Name Date Influenza High Dose Vaccine PF 10/25/2017 Not reviewed this visit You Were Diagnosed With   
  
 Codes Comments Essential hypertension, benign    -  Primary ICD-10-CM: I10 
ICD-9-CM: 401.1 Pure hypercholesterolemia     ICD-10-CM: E78.00 ICD-9-CM: 272.0 Gastroesophageal reflux disease without esophagitis     ICD-10-CM: K21.9 ICD-9-CM: 530.81 Vitamin D deficiency     ICD-10-CM: E55.9 ICD-9-CM: 268.9 Situational stress     ICD-10-CM: F43.9 ICD-9-CM: V62.89 Overweight (BMI 25.0-29. 9)     ICD-10-CM: R81.3 ICD-9-CM: 278.02 Osteopenia, unspecified location     ICD-10-CM: M85.80 ICD-9-CM: 733.90 Vitals BP Pulse Temp Resp Height(growth percentile) Weight(growth percentile) 134/84 76 98.1 °F (36.7 °C) (Oral) 16 5' 3\" (1.6 m) 158 lb 12.8 oz (72 kg) SpO2 BMI OB Status Smoking Status 98% 28.13 kg/m2 Hysterectomy Former Smoker BMI and BSA Data Body Mass Index Body Surface Area 28.13 kg/m 2 1.79 m 2 Preferred Pharmacy Pharmacy Name Phone Henrietta Ann [address] [facility], [address] [place] [address],# [phone] Your Updated Medication List  
  
   
This list is accurate as of: 1/24/18  9:34 AM.  Always use your most recent med list.  
  
  
  
  
 aspirin delayed-release 81 mg tablet Take 81 mg by mouth every other day. FISH OIL 1,000 mg Cap Generic drug:  omega-3 fatty acids-vitamin e Take 1 Cap by mouth daily. hydroCHLOROthiazide 12.5 mg tablet Commonly known as:  HYDRODIURIL  
TAKE ONE TABLET BY MOUTH ONCE DAILY  
  
 metoprolol succinate 100 mg tablet Commonly known as:  TOPROL-XL  
TAKE ONE TABLET BY MOUTH ONCE DAILY PriLOSEC 20 mg capsule Generic drug:  omeprazole Take 20 mg by mouth daily. simvastatin 5 mg tablet Commonly known as:  ZOCOR Take 1 Tab by mouth nightly. Indications: hypercholesterolemia VITAMIN D 2,000 unit Cap capsule Generic drug:  Cholecalciferol (Vitamin D3) Take  by mouth daily. Prescriptions Sent to Pharmacy Refills  
 simvastatin (ZOCOR) 5 mg tablet 1 Sig: Take 1 Tab by mouth nightly. Indications: hypercholesterolemia Class: Normal  
 Pharmacy: [facility] [address] CORTEZ PAGE [address] [facility], [address] [place] [address] #: [phone] Route: Oral  
  
Follow-up Instructions Return in about 6 weeks (around 3/7/2018) for labs one week prior. Patient Instructions Hyperlipidemia: After Your Visit Your Care Instructions Hyperlipidemia is too much fat in your blood. The body has several kinds of fat, including cholesterol and triglycerides. Your body needs fat for many things, such as making new cells. But too much fat in your blood increases your chances of having a heart attack or stroke. You may be able to lower your cholesterol and triglycerides with a heart-healthy diet, exercise, and if needed, medicine.  Your doctor may want you to try lifestyle changes first to see whether they lower the fat in your blood. You may need to take medicine if lifestyle changes do not lower the fat in your blood enough. Follow-up care is a key part of your treatment and safety. Be sure to make and go to all appointments, and call your doctor if you are having problems. Its also a good idea to know your test results and keep a list of the medicines you take. How can you care for yourself at home? Take your medicines · Take your medicines exactly as prescribed. Call your doctor if you think you are having a problem with your medicine. · If you take medicine to lower your cholesterol, go to follow-up visits. You will need to have blood tests. · Do not take large doses of niacin, which is a B vitamin, while taking medicine called statins. It may increase the chance of muscle pain and liver problems. · Talk to your doctor about avoiding grapefruit juice if you are taking statins. Grapefruit juice can raise the level of this medicine in your blood. This could increase side effects. Eat more fruits, vegetables, and fiber · Fruits and vegetables have lots of nutrients that help protect against heart disease, and they have littleif anyfat. Try to eat at least five servings a day. Dark green, deep orange, or yellow fruits and vegetables are healthy choices. · Keep carrots, celery, and other veggies handy for snacks. Buy fruit that is in season and store it where you can see it so that you will be tempted to eat it. Cook dishes that have a lot of veggies in them, such as stir-fries and soups. · Foods high in fiber may reduce your cholesterol and provide important vitamins and minerals. High-fiber foods include whole-grain cereals and breads, oatmeal, beans, brown rice, citrus fruits, and apples. · Buy whole-grain breads and cereals instead of white bread and pastries. Limit saturated fat · Read food labels and try to avoid saturated fat and trans fat. They increase your risk of heart disease. · Use olive or canola oil when you cook. Try cholesterol-lowering spreads, such as Benecol or Take Control. · Bake, broil, grill, or steam foods instead of frying them. · Limit the amount of high-fat meats you eat, including hot dogs and sausages. Cut out all visible fat when you prepare meat. · Eat fish, skinless poultry, and soy products such as tofu instead of high-fat meats. Soybeans may be especially good for your heart. Eat at least two servings of fish a week. Certain fish, such as salmon, contain omega-3 fatty acids, which may help reduce your risk of heart attack. · Choose low-fat or fat-free milk and dairy products. Get exercise, limit alcohol, and quit smoking · Get more exercise. Work with your doctor to set up an exercise program. Even if you can do only a small amount, exercise will help you get stronger, have more energy, and manage your weight and your stress. Walking is an easy way to get exercise. Gradually increase the amount you walk every day. Aim for at least 30 minutes on most days of the week. You also may want to swim, bike, or do other activities. · Limit alcohol to no more than 2 drinks a day for men and 1 drink a day for women. · Do not smoke. If you need help quitting, talk to your doctor about stop-smoking programs and medicines. These can increase your chances of quitting for good. When should you call for help? Call 911 anytime you think you may need emergency care. For example, call if: 
· You have symptoms of a heart attack. These may include: ¨ Chest pain or pressure, or a strange feeling in the chest. 
¨ Sweating. ¨ Shortness of breath. ¨ Nausea or vomiting. ¨ Pain, pressure, or a strange feeling in the back, neck, jaw, or upper belly or in one or both shoulders or arms. ¨ Lightheadedness or sudden weakness. ¨ A fast or irregular heartbeat. After you call 911, the  may tell you to chew 1 adult-strength or 2 to 4 low-dose aspirin. Wait for an ambulance. Do not try to drive yourself. · You have signs of a stroke. These may include: 
¨ Sudden numbness, paralysis, or weakness in your face, arm, or leg, especially on only one side of your body. ¨ New problems with walking or balance. ¨ Sudden vision changes. ¨ Drooling or slurred speech. ¨ New problems speaking or understanding simple statements, or feeling confused. ¨ A sudden, severe headache that is different from past headaches. · You passed out (lost consciousness). Call your doctor now or seek immediate medical care if: 
· You have muscle pain or weakness. Watch closely for changes in your health, and be sure to contact your doctor if: 
· You are very tired. · You have an upset stomach, gas, constipation, or belly pain or cramps. Where can you learn more? Go to [employer] (CircleMe).be Enter C406 in the search box to learn more about \"Hyperlipidemia: After Your Visit. \"  
© 3205-6466 [employer]wise[employer]. Care instructions adapted under license by Neeraj Mason (which disclaims liability or warranty for this information). This care instruction is for use with your licensed healthcare professional. If you have questions about a medical condition or this instruction, always ask your healthcare professional. [address] any warranty or liability for your use of this information. Content Version: 3.1.419263; Last Revised: October 13, 2011 Dementia: Care Instructions Your Care Instructions Dementia is a loss of mental skills that affects your daily life. It is different than the occasional trouble with memory that is part of aging. You may find it hard to remember things that you feel you should be able to remember.  Or you may feel that your mind is just not working as well as usual. 
 Finding out that you have dementia is a shock. You may be afraid and worried about how the condition will change your life. Although there is no cure at this time, medicine may slow memory loss and improve thinking for a while. Other medicines may be able to help you sleep or cope with depression and behavior changes. Dementia often gets worse slowly. But it can get worse quickly. As dementia gets worse, it may become harder to do common things that take planning, like making a list and going shopping. Over time, the disease may make it hard for you to take care of yourself. Some people with dementia need others to help care for them. Dementia is different for everyone. You may be able to function well for a long time. In the early stage of the condition, you can do things at home to make life easier and safer. You also can keep doing your hobbies and other activities. Many people find comfort in planning now for their future needs. Follow-up care is a key part of your treatment and safety. Be sure to make and go to all appointments, and call your doctor if you are having problems. It's also a good idea to know your test results and keep a list of the medicines you take. How can you care for yourself at home? · Take your medicines exactly as prescribed. Call your doctor if you think you are having a problem with your medicine. · Eat healthy foods. Eat lots of whole grains, fruits, and vegetables every day. If you are not hungry, try snacks or nutritional drinks such as Boost, Ensure, or Sustacal. 
· If you have problems sleeping: ¨ Try not to nap too close to your bedtime. ¨ Exercise regularly. Walking is a good choice. ¨ Try a glass of warm milk or caffeine-free herbal tea before bed. · Do tasks and activities during the time of day when you feel your best. It may help to develop a daily routine. · Post labels, lists, and sticky notes to help you remember things.  Write your activities on a calendar you can easily find. Put your clock where you can easily see it. · Stay active. Take walks in familiar places, or with friends or loved ones. Try to stay active mentally too. Read and work crossword puzzles if you enjoy these activities. · Do not drive unless you can pass an on-road driving test. If you are not sure if you are safe to drive, your state 's license bureau can test you. · Keep a cordless phone and a flashlight with new batteries by your bed. If possible, put a phone in each of the main rooms of your house, or carry a cell phone in case you fall and cannot reach a phone. Or, you can wear a device around your neck or wrist. You push a button that sends a signal for help. Acknowledge your emotions and plan for the future · Talk openly and honestly with your doctor. · Let yourself grieve. It is common to feel angry, scared, frustrated, anxious, or depressed. · Get emotional support from family, friends, a support group, or a counselor experienced in working with people who have dementia. · Ask for help if you need it. · Plan for the future. ¨ Talk to your family and doctor about preparing a living will and other important papers while you can make decisions. These papers tell your doctors how to care for you at the end of your life. ¨ Consider naming a person to make decisions about your care if you are not able to. When should you call for help? Call 911 anytime you think you may need emergency care. For example, call if: 
? · You are lost and do not know whom to call. ? · You are injured and do not know whom to call. ?Call your doctor now or seek immediate medical care if: 
? · You are more confused or upset than usual.  
? · You feel like you could hurt yourself because your mind is not working well. ? Watch closely for changes in your health, and be sure to contact your doctor if you have any problems. Where can you learn more? Go to http://rad-batool.info/. Enter U419 in the search box to learn more about \"Dementia: Care Instructions. \" Current as of: May 12, 2017 Content Version: 11.4 © 1195-4487 [employer]. Care instructions adapted under license by [employer] (which disclaims liability or warranty for this information). If you have questions about a medical condition or this instruction, always ask your healthcare professional. [address] any warranty or liability for your use of this information. Learning About the [address] Diet What is the Mediterranean diet? The Mediterranean diet is a style of eating rather than a diet plan. It features foods eaten in [place], [place], [place] and [place], and other countries along the [facility]. It emphasizes eating foods like fish, fruits, vegetables, beans, high-fiber breads and whole grains, nuts, and olive oil. This style of eating includes limited red meat, cheese, and sweets. Why choose the Mediterranean diet? A Mediterranean-style diet may improve heart health. It contains more fat than other heart-healthy diets. But the fats are mainly from nuts, unsaturated oils (such as fish oils and olive oil), and certain nut or seed oils (such as canola, soybean, or flaxseed oil). These fats may help protect the heart and blood vessels. How can you get started on the Mediterranean diet? Here are some things you can do to switch to a more Mediterranean way of eating. What to eat · Eat a variety of fruits and vegetables each day, such as grapes, blueberries, tomatoes, broccoli, peppers, figs, olives, spinach, eggplant, beans, lentils, and chickpeas. · Eat a variety of whole-grain foods each day, such as oats, brown rice, and whole wheat bread, pasta, and couscous. · Eat fish at least 2 times a week. Try tuna, salmon, mackerel, lake trout, herring, or sardines. · Eat moderate amounts of low-fat dairy products, such as milk, cheese, or yogurt. · Eat moderate amounts of poultry and eggs. · Choose healthy (unsaturated) fats, such as nuts, olive oil, and certain nut or seed oils like canola, soybean, and flaxseed. · Limit unhealthy (saturated) fats, such as butter, palm oil, and coconut oil. And limit fats found in animal products, such as meat and dairy products made with whole milk. Try to eat red meat only a few times a month in very small amounts. · Limit sweets and desserts to only a few times a week. This includes sugar-sweetened drinks like soda. The Mediterranean diet may also include red wine with your meal-1 glass each day for women and up to 2 glasses a day for men. Tips for eating at home · Use herbs, spices, garlic, lemon zest, and citrus juice instead of salt to add flavor to foods. · Add avocado slices to your sandwich instead of falcon. · Have fish for lunch or dinner instead of red meat. Brush the fish with olive oil, and broil or grill it. · Sprinkle your salad with seeds or nuts instead of cheese. · Cook with olive or canola oil instead of butter or oils that are high in saturated fat. · Switch from 2% milk or whole milk to 1% or fat-free milk. · Dip raw vegetables in a vinaigrette dressing or hummus instead of dips made from mayonnaise or sour cream. 
· Have a piece of fruit for dessert instead of a piece of cake. Try baked apples, or have some dried fruit. Tips for eating out · Try broiled, grilled, baked, or poached fish instead of having it fried or breaded. · Ask your  to have your meals prepared with olive oil instead of butter. · Order dishes made with marinara sauce or sauces made from olive oil. Avoid sauces made from cream or mayonnaise. · Choose whole-grain breads, whole wheat pasta and pizza crust, brown rice, beans, and lentils. · Cut back on butter or margarine on bread.  Instead, you can dip your bread in a small amount of olive oil. · Ask for a side salad or grilled vegetables instead of french fries or chips. Where can you learn more? Go to http://rad-batool.info/. Enter [phone] in the search box to learn more about \"Learning About the Mediterranean Diet. \" Current as of: May 12, 2017 Content Version: 11.4 © 4613-5652 [employer]. Care instructions adapted under license by [employer] (which disclaims liability or warranty for this information). If you have questions about a medical condition or this instruction, always ask your healthcare professional. [address] any warranty or liability for your use of this information. Introducing [facility]! Dear Grace Rodrigues: Thank you for requesting a [employer] account. Our records indicate that you already have an active [employer] account. You can access your account anytime at [email or web address] Did you know that you can access your hospital and ER discharge instructions at any time in [employer]? You can also review all of your test results from your hospital stay or ER visit. Additional Information If you have questions, please visit the Frequently Asked Questions section of the [employer] website at [email or web address] Remember, [employer] is NOT to be used for urgent needs. For medical emergencies, dial 911. Now available from your iPhone and Android! Please provide this summary of care documentation to your next provider. Your primary care clinician is listed as Faby Escobar. If you have any questions after today's visit, please call [phone].

## 2018-01-24 NOTE — PROGRESS NOTES
Pt is here for 3 month follow up. HTN, GERD, Chol, Vit D   Labs done  Pt states she has been having gas & indigestion since she was last seen. 1. Have you been to the ER, urgent care clinic since your last visit? Hospitalized since your last visit? No    2. Have you seen or consulted any other health care providers outside of the [address] [place] [address] since your last visit? Include any pap smears or colon screening.  No

## 2018-02-22 RX ORDER — METOPROLOL SUCCINATE 100 MG/1
TABLET, EXTENDED RELEASE ORAL
Qty: 30 TAB | Refills: 0 | Status: SHIPPED | OUTPATIENT
Start: 2018-02-22 | End: 2018-03-24 | Stop reason: SDUPTHER

## 2018-02-28 ENCOUNTER — HOSPITAL ENCOUNTER (OUTPATIENT)
Dept: LAB | Age: 73
Discharge: HOME | End: 2018-02-28
Payer: MEDICARE

## 2018-02-28 ENCOUNTER — LAB ONLY (OUTPATIENT)
Dept: FAMILY MEDICINE CLINIC | Age: 73
End: 2018-02-28

## 2018-02-28 DIAGNOSIS — E78.00 PURE HYPERCHOLESTEROLEMIA: Primary | ICD-10-CM

## 2018-02-28 DIAGNOSIS — E78.00 PURE HYPERCHOLESTEROLEMIA: ICD-10-CM

## 2018-02-28 LAB
CHOLEST SERPL-MCNC: 181 MG/DL
HDLC SERPL-MCNC: 62 MG/DL (ref 40–60)
HDLC SERPL: 2.9 {RATIO} (ref 0–5)
LDLC SERPL CALC-MCNC: 109.8 MG/DL (ref 0–100)
LIPID PROFILE,FLP: ABNORMAL
TRIGL SERPL-MCNC: 46 MG/DL (ref ?–150)
VLDLC SERPL CALC-MCNC: 9.2 MG/DL

## 2018-02-28 PROCEDURE — 80061 LIPID PANEL: CPT | Performed by: PHYSICIAN ASSISTANT

## 2018-02-28 PROCEDURE — 36415 COLL VENOUS BLD VENIPUNCTURE: CPT | Performed by: PHYSICIAN ASSISTANT

## 2018-03-07 ENCOUNTER — OFFICE VISIT (OUTPATIENT)
Dept: FAMILY MEDICINE CLINIC | Age: 73
End: 2018-03-07

## 2018-03-07 VITALS
TEMPERATURE: 97.7 F | HEIGHT: 63 IN | WEIGHT: 152.2 LBS | HEART RATE: 69 BPM | OXYGEN SATURATION: 98 % | RESPIRATION RATE: 18 BRPM | DIASTOLIC BLOOD PRESSURE: 85 MMHG | BODY MASS INDEX: 26.97 KG/M2 | SYSTOLIC BLOOD PRESSURE: 135 MMHG

## 2018-03-07 DIAGNOSIS — F43.9 SITUATIONAL STRESS: ICD-10-CM

## 2018-03-07 DIAGNOSIS — R21 RASH: ICD-10-CM

## 2018-03-07 DIAGNOSIS — E66.3 OVERWEIGHT (BMI 25.0-29.9): ICD-10-CM

## 2018-03-07 DIAGNOSIS — E78.00 PURE HYPERCHOLESTEROLEMIA: Primary | ICD-10-CM

## 2018-03-07 RX ORDER — TRIAMCINOLONE ACETONIDE 5 MG/G
CREAM TOPICAL 2 TIMES DAILY
Qty: 15 G | Refills: 0 | Status: SHIPPED | OUTPATIENT
Start: 2018-03-07 | End: 2018-05-02

## 2018-03-07 NOTE — PROGRESS NOTES
Sheldon Sheets is a  81 y.o. female presents today for office visit for routine follow up. 1. Have you been to the ER, urgent care clinic or hospitalized since your last visit? NO    2. Have you seen or consulted any other health care providers outside of the [address] [place] [address] since your last visit (Include any pap smears or colon screening)?  NO

## 2018-03-07 NOTE — PROGRESS NOTES
HPI:    Bailey Salazar  is a 81 y.o.  female  patient who comes in today for follow up. She is taking zocor 5mg without side effects, no fried chicken or french fries in 6 weeks, she is eating more fruits and vegetable and has increased fiber intake. She has lost 6 pounds. She is walking 3 times a week 2-3 miles. She is still having difficulty with her 's dementia but she is emotionally doing well. She denies pain, CP, SOB, dizziness. Current Outpatient Prescriptions   Medication Sig Dispense Refill    triamcinolone (ARISTOCORT) 0.5 % topical cream Apply  to affected area two (2) times a day. use thin layer 15 g 0    metoprolol succinate (TOPROL-XL) 100 mg tablet TAKE ONE TABLET BY MOUTH ONCE DAILY 30 Tab 0    simvastatin (ZOCOR) 5 mg tablet Take 1 Tab by mouth nightly. Indications: hypercholesterolemia 30 Tab 1    hydroCHLOROthiazide (HYDRODIURIL) 12.5 mg tablet TAKE ONE TABLET BY MOUTH ONCE DAILY 90 Tab 0    omeprazole (PRILOSEC) 20 mg capsule Take 20 mg by mouth daily.  aspirin delayed-release 81 mg tablet Take 81 mg by mouth every other day.  omega-3 fatty acids-vitamin e (FISH OIL) 1,000 mg Cap Take 1 Cap by mouth daily.  Cholecalciferol, Vitamin D3, (VITAMIN D) 2,000 unit Cap Take  by mouth daily.         Allergies   Allergen Reactions    Naproxen Palpitations      Past Medical History:   Diagnosis Date    Fibrocystic disease of breast     GERD (gastroesophageal reflux disease)     Hx of dizziness     Hypercholesterolemia     Hypertension     PND (paroxysmal nocturnal dyspnea)     Evaluated by Dr. Brianda Fonseca (March 2012)    Vaginitis atrophic 7-29-09    Vitamin D deficiency 4-2009      Family History   Problem Relation Age of Onset    Hypertension Mother     Arthritis-osteo Mother     Heart Attack Father     Hypertension Sister     High Cholesterol Sister     Hypertension Brother       Patient Active Problem List   Diagnosis Code    Essential hypertension, benign I10    Pure hypercholesterolemia E78.00    GERD (gastroesophageal reflux disease) K21.9    Vitamin D deficiency E55.9    PND (paroxysmal nocturnal dyspnea) R06.00    Pain in heel M79.673    Situational stress F43.9    Influenza vaccination declined Z28.21    23-polyvalent pneumococcal polysaccharide vaccine declined Z28.21    Overweight (BMI 25.0-29. 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 HENT:   Head: Normocephalic and atraumatic. Neck: Normal range of motion. Neck supple. Cardiovascular: Normal rate, regular rhythm and normal heart sounds. Pulmonary/Chest: Effort normal and breath sounds normal.   Vitals reviewed. Health Maintenance Due   Topic Date Due    GLAUCOMA SCREENING Q2Y  04/28/2018     Lab Results   Component Value Date/Time    Cholesterol, total 181 02/28/2018 08:45 AM    HDL Cholesterol 62 (H) 02/28/2018 08:45 AM    LDL, calculated 109.8 (H) 02/28/2018 08:45 AM    VLDL, calculated 9.2 02/28/2018 08:45 AM    Triglyceride 46 02/28/2018 08:45 AM    CHOL/HDL Ratio 2.9 02/28/2018 08:45 AM         Assessment/Plan:    Diagnoses and all orders for this visit:    1. Pure hypercholesterolemia - cholesterol is coming down, cont current regimen    2. Overweight (BMI 25.0-29.9) - patient lost 6 pounds through healthy diet and exercise    3. Situational stress - doing well     Follow-up Disposition:  Return in about 4 weeks (around 4/4/2018) for one week prior for labs. Additional Notes: Discussed today's diagnosis, treatment plans.  Discussed medication indications and side effects. Preventive Medicine:   Weight Management:  Mediterranean diet recommended as well as exercise for 30 minutes daily most days of the week. DASH diet info given. After Visit Summary: Provided and discussed printed patient instructions. Answered all questions and patient acknowledged understanding. Harley Summers PA-C     I reviewed the patient's medical history, the physician assistant's findings on physical examination, the patient's diagnoses, and treatment plan as documented in the progress note. I concur with the treatment plan as documented. There are no additional recommendations at this time.     Napoleon Robbins MD

## 2018-03-07 NOTE — PATIENT INSTRUCTIONS
High Blood Pressure: Care Instructions  Your Care Instructions    If your blood pressure is usually above 140/90, you have high blood pressure, or hypertension. That means the top number is 140 or higher or the bottom number is 90 or higher, or both. Despite what a lot of people think, high blood pressure usually doesn't cause headaches or make you feel dizzy or lightheaded. It usually has no symptoms. But it does increase your risk for heart attack, stroke, and kidney or eye damage. The higher your blood pressure, the more your risk increases. Your doctor will give you a goal for your blood pressure. Your goal will be based on your health and your age. An example of a goal is to keep your blood pressure below 140/90. Lifestyle changes, such as eating healthy and being active, are always important to help lower blood pressure. You might also take medicine to reach your blood pressure goal.  Follow-up care is a key part of your treatment and safety. Be sure to make and go to all appointments, and call your doctor if you are having problems. It's also a good idea to know your test results and keep a list of the medicines you take. How can you care for yourself at home? Medical treatment  · If you stop taking your medicine, your blood pressure will go back up. You may take one or more types of medicine to lower your blood pressure. Be safe with medicines. Take your medicine exactly as prescribed. Call your doctor if you think you are having a problem with your medicine. · Talk to your doctor before you start taking aspirin every day. Aspirin can help certain people lower their risk of a heart attack or stroke. But taking aspirin isn't right for everyone, because it can cause serious bleeding. · See your doctor regularly. You may need to see the doctor more often at first or until your blood pressure comes down.   · If you are taking blood pressure medicine, talk to your doctor before you take decongestants or anti-inflammatory medicine, such as ibuprofen. Some of these medicines can raise blood pressure. · Learn how to check your blood pressure at home. Lifestyle changes  · Stay at a healthy weight. This is especially important if you put on weight around the waist. Losing even 10 pounds can help you lower your blood pressure. · If your doctor recommends it, get more exercise. Walking is a good choice. Bit by bit, increase the amount you walk every day. Try for at least 30 minutes on most days of the week. You also may want to swim, bike, or do other activities. · Avoid or limit alcohol. Talk to your doctor about whether you can drink any alcohol. · Try to limit how much sodium you eat to less than 2,300 milligrams (mg) a day. Your doctor may ask you to try to eat less than 1,500 mg a day. · Eat plenty of fruits (such as bananas and oranges), vegetables, legumes, whole grains, and low-fat dairy products. · Lower the amount of saturated fat in your diet. Saturated fat is found in animal products such as milk, cheese, and meat. Limiting these foods may help you lose weight and also lower your risk for heart disease. · Do not smoke. Smoking increases your risk for heart attack and stroke. If you need help quitting, talk to your doctor about stop-smoking programs and medicines. These can increase your chances of quitting for good. When should you call for help? Call 911 anytime you think you may need emergency care. This may mean having symptoms that suggest that your blood pressure is causing a serious heart or blood vessel problem. Your blood pressure may be over 180/110. ? For example, call 911 if:  ? · You have symptoms of a heart attack. These may include:  ¨ Chest pain or pressure, or a strange feeling in the chest.  ¨ Sweating. ¨ Shortness of breath. ¨ Nausea or vomiting.   ¨ Pain, pressure, or a strange feeling in the back, neck, jaw, or upper belly or in one or both shoulders or arms.  ¨ Lightheadedness or sudden weakness. ¨ A fast or irregular heartbeat. ? · You have symptoms of a stroke. These may include:  ¨ Sudden numbness, tingling, weakness, or loss of movement in your face, arm, or leg, especially on only one side of your body. ¨ Sudden vision changes. ¨ Sudden trouble speaking. ¨ Sudden confusion or trouble understanding simple statements. ¨ Sudden problems with walking or balance. ¨ A sudden, severe headache that is different from past headaches. ? · You have severe back or belly pain. ?Do not wait until your blood pressure comes down on its own. Get help right away. ?Call your doctor now or seek immediate care if:  ? · Your blood pressure is much higher than normal (such as 180/110 or higher), but you don't have symptoms. ? · You think high blood pressure is causing symptoms, such as:  ¨ Severe headache. ¨ Blurry vision. ? Watch closely for changes in your health, and be sure to contact your doctor if:  ? · Your blood pressure measures 140/90 or higher at least 2 times. That means the top number is 140 or higher or the bottom number is 90 or higher, or both. ? · You think you may be having side effects from your blood pressure medicine. ? · Your blood pressure is usually normal, but it goes above normal at least 2 times. Where can you learn more? Go to http://rad-batool.info/. Enter F381 in the search box to learn more about \"High Blood Pressure: Care Instructions. \"  Current as of: September 21, 2016  Content Version: 11.4  © 0151-4605 [employer]. Care instructions adapted under license by [employer] (which disclaims liability or warranty for this information). If you have questions about a medical condition or this instruction, always ask your healthcare professional. [place] [address] any warranty or liability for your use of this information.     Hyperlipidemia: After Your Visit  Your Care Instructions  Hyperlipidemia is too much fat in your blood. The body has several kinds of fat, including cholesterol and triglycerides. Your body needs fat for many things, such as making new cells. But too much fat in your blood increases your chances of having a heart attack or stroke. You may be able to lower your cholesterol and triglycerides with a heart-healthy diet, exercise, and if needed, medicine. Your doctor may want you to try lifestyle changes first to see whether they lower the fat in your blood. You may need to take medicine if lifestyle changes do not lower the fat in your blood enough. Follow-up care is a key part of your treatment and safety. Be sure to make and go to all appointments, and call your doctor if you are having problems. Its also a good idea to know your test results and keep a list of the medicines you take. How can you care for yourself at home? Take your medicines  · Take your medicines exactly as prescribed. Call your doctor if you think you are having a problem with your medicine. · If you take medicine to lower your cholesterol, go to follow-up visits. You will need to have blood tests. · Do not take large doses of niacin, which is a B vitamin, while taking medicine called statins. It may increase the chance of muscle pain and liver problems. · Talk to your doctor about avoiding grapefruit juice if you are taking statins. Grapefruit juice can raise the level of this medicine in your blood. This could increase side effects. Eat more fruits, vegetables, and fiber  · Fruits and vegetables have lots of nutrients that help protect against heart disease, and they have little--if any--fat. Try to eat at least five servings a day. Dark green, deep orange, or yellow fruits and vegetables are healthy choices. · Keep carrots, celery, and other veggies handy for snacks. Buy fruit that is in season and store it where you can see it so that you will be tempted to eat it.  Cook dishes that have a lot of veggies in them, such as stir-fries and soups. · Foods high in fiber may reduce your cholesterol and provide important vitamins and minerals. High-fiber foods include whole-grain cereals and breads, oatmeal, beans, brown rice, citrus fruits, and apples. · Buy whole-grain breads and cereals instead of white bread and pastries. Limit saturated fat  · Read food labels and try to avoid saturated fat and trans fat. They increase your risk of heart disease. · Use olive or canola oil when you cook. Try cholesterol-lowering spreads, such as Benecol or Take Control. · Bake, broil, grill, or steam foods instead of frying them. · Limit the amount of high-fat meats you eat, including hot dogs and sausages. Cut out all visible fat when you prepare meat. · Eat fish, skinless poultry, and soy products such as tofu instead of high-fat meats. Soybeans may be especially good for your heart. Eat at least two servings of fish a week. Certain fish, such as salmon, contain omega-3 fatty acids, which may help reduce your risk of heart attack. · Choose low-fat or fat-free milk and dairy products. Get exercise, limit alcohol, and quit smoking  · Get more exercise. Work with your doctor to set up an exercise program. Even if you can do only a small amount, exercise will help you get stronger, have more energy, and manage your weight and your stress. Walking is an easy way to get exercise. Gradually increase the amount you walk every day. Aim for at least 30 minutes on most days of the week. You also may want to swim, bike, or do other activities. · Limit alcohol to no more than 2 drinks a day for men and 1 drink a day for women. · Do not smoke. If you need help quitting, talk to your doctor about stop-smoking programs and medicines. These can increase your chances of quitting for good. When should you call for help? Call 911 anytime you think you may need emergency care.  For example, call if:  · You have symptoms of a heart attack. These may include:  ¨ Chest pain or pressure, or a strange feeling in the chest.  ¨ Sweating. ¨ Shortness of breath. ¨ Nausea or vomiting. ¨ Pain, pressure, or a strange feeling in the back, neck, jaw, or upper belly or in one or both shoulders or arms. ¨ Lightheadedness or sudden weakness. ¨ A fast or irregular heartbeat. After you call 911, the  may tell you to chew 1 adult-strength or 2 to 4 low-dose aspirin. Wait for an ambulance. Do not try to drive yourself. · You have signs of a stroke. These may include:  ¨ Sudden numbness, paralysis, or weakness in your face, arm, or leg, especially on only one side of your body. ¨ New problems with walking or balance. ¨ Sudden vision changes. ¨ Drooling or slurred speech. ¨ New problems speaking or understanding simple statements, or feeling confused. ¨ A sudden, severe headache that is different from past headaches. · You passed out (lost consciousness). Call your doctor now or seek immediate medical care if:  · You have muscle pain or weakness. Watch closely for changes in your health, and be sure to contact your doctor if:  · You are very tired. · You have an upset stomach, gas, constipation, or belly pain or cramps. Where can you learn more? Go to e-channel.be  Enter C406 in the search box to learn more about \"Hyperlipidemia: After Your Visit. \"   © 9258-1368 [employer]. Care instructions adapted under license by [employer] (which disclaims liability or warranty for this information). This care instruction is for use with your licensed healthcare professional. If you have questions about a medical condition or this instruction, always ask your healthcare professional. [place] [address] any warranty or liability for your use of this information.   Content Version: 7.0.637074; Last Revised: October 13, 2011

## 2018-03-07 NOTE — MR AVS SNAPSHOT
[facility] 
 
 
 [address], [place] [address] [facility] [address] 
[phone] Patient: Jerilyn Gold MRN: [id number] LEXII:[id number] Visit Information Date & Time Provider Department Dept. Phone Encounter #  
 3/7/2018  9:00 AM Alex Mckeon PA-C [facility] [phone] [id number] Follow-up Instructions Return in about 4 weeks (around 4/4/2018) for one week prior for labs. Upcoming Health Maintenance Date Due  
 GLAUCOMA SCREENING Q2Y 4/28/2018 Pneumococcal 65+ Low/Medium Risk (2 of 2 - PPSV23) 4/26/2018 MEDICARE YEARLY EXAM 10/26/2018 BREAST CANCER SCRN MAMMOGRAM 5/22/2019 COLONOSCOPY 5/1/2025 DTaP/Tdap/Td series (2 - Td) 1/24/2028 Allergies as of 3/7/2018  Review Complete On: 3/7/2018 By: Alex Mckeon PA-C Severity Noted Reaction Type Reactions Naproxen  04/06/2010    Palpitations Current Immunizations  Reviewed on 6/26/2014 Name Date Influenza High Dose Vaccine PF 10/25/2017 Not reviewed this visit You Were Diagnosed With   
  
 Codes Comments Pure hypercholesterolemia    -  Primary ICD-10-CM: E78.00 ICD-9-CM: 272.0 Overweight (BMI 25.0-29. 9)     ICD-10-CM: K82.5 ICD-9-CM: 278.02 Situational stress     ICD-10-CM: F43.9 ICD-9-CM: V62.89 Vitals BP Pulse Temp Resp Height(growth percentile) Weight(growth percentile) 135/85 (BP 1 Location: Left arm, BP Patient Position: Sitting) 69 97.7 °F (36.5 °C) (Oral) 18 5' 3\" (1.6 m) 152 lb 3.2 oz (69 kg) SpO2 BMI OB Status Smoking Status 98% 26.96 kg/m2 Hysterectomy Former Smoker BMI and BSA Data Body Mass Index Body Surface Area  
 26.96 kg/m 2 1.75 m 2 Preferred Pharmacy Pharmacy Name Phone [address] [phone] Your Updated Medication List  
  
   
 This list is accurate as of 3/7/18 10:03 AM.  Always use your most recent med list.  
  
  
  
  
 aspirin delayed-release 81 mg tablet Take 81 mg by mouth every other day. FISH OIL 1,000 mg Cap Generic drug:  omega-3 fatty acids-vitamin e Take 1 Cap by mouth daily. hydroCHLOROthiazide 12.5 mg tablet Commonly known as:  HYDRODIURIL  
TAKE ONE TABLET BY MOUTH ONCE DAILY  
  
 metoprolol succinate 100 mg tablet Commonly known as:  TOPROL-XL  
TAKE ONE TABLET BY MOUTH ONCE DAILY PriLOSEC 20 mg capsule Generic drug:  omeprazole Take 20 mg by mouth daily. simvastatin 5 mg tablet Commonly known as:  ZOCOR Take 1 Tab by mouth nightly. Indications: hypercholesterolemia VITAMIN D 2,000 unit Cap capsule Generic drug:  Cholecalciferol (Vitamin D3) Take  by mouth daily. Follow-up Instructions Return in about 4 weeks (around 4/4/2018) for one week prior for labs. Patient Instructions High Blood Pressure: Care Instructions Your Care Instructions If your blood pressure is usually above 140/90, you have high blood pressure, or hypertension. That means the top number is 140 or higher or the bottom number is 90 or higher, or both. Despite what a lot of people think, high blood pressure usually doesn't cause headaches or make you feel dizzy or lightheaded. It usually has no symptoms. But it does increase your risk for heart attack, stroke, and kidney or eye damage. The higher your blood pressure, the more your risk increases. Your doctor will give you a goal for your blood pressure. Your goal will be based on your health and your age. An example of a goal is to keep your blood pressure below 140/90. Lifestyle changes, such as eating healthy and being active, are always important to help lower blood pressure.  You might also take medicine to reach your blood pressure goal. 
 Follow-up care is a key part of your treatment and safety. Be sure to make and go to all appointments, and call your doctor if you are having problems. It's also a good idea to know your test results and keep a list of the medicines you take. How can you care for yourself at home? Medical treatment · If you stop taking your medicine, your blood pressure will go back up. You may take one or more types of medicine to lower your blood pressure. Be safe with medicines. Take your medicine exactly as prescribed. Call your doctor if you think you are having a problem with your medicine. · Talk to your doctor before you start taking aspirin every day. Aspirin can help certain people lower their risk of a heart attack or stroke. But taking aspirin isn't right for everyone, because it can cause serious bleeding. · See your doctor regularly. You may need to see the doctor more often at first or until your blood pressure comes down. · If you are taking blood pressure medicine, talk to your doctor before you take decongestants or anti-inflammatory medicine, such as ibuprofen. Some of these medicines can raise blood pressure. · Learn how to check your blood pressure at home. Lifestyle changes · Stay at a healthy weight. This is especially important if you put on weight around the waist. Losing even 10 pounds can help you lower your blood pressure. · If your doctor recommends it, get more exercise. Walking is a good choice. Bit by bit, increase the amount you walk every day. Try for at least 30 minutes on most days of the week. You also may want to swim, bike, or do other activities. · Avoid or limit alcohol. Talk to your doctor about whether you can drink any alcohol. · Try to limit how much sodium you eat to less than 2,300 milligrams (mg) a day. Your doctor may ask you to try to eat less than 1,500 mg a day.  
· Eat plenty of fruits (such as bananas and oranges), vegetables, legumes, whole grains, and low-fat dairy products. · Lower the amount of saturated fat in your diet. Saturated fat is found in animal products such as milk, cheese, and meat. Limiting these foods may help you lose weight and also lower your risk for heart disease. · Do not smoke. Smoking increases your risk for heart attack and stroke. If you need help quitting, talk to your doctor about stop-smoking programs and medicines. These can increase your chances of quitting for good. When should you call for help? Call 911 anytime you think you may need emergency care. This may mean having symptoms that suggest that your blood pressure is causing a serious heart or blood vessel problem. Your blood pressure may be over 180/110. ? For example, call 911 if: 
? · You have symptoms of a heart attack. These may include: ¨ Chest pain or pressure, or a strange feeling in the chest. 
¨ Sweating. ¨ Shortness of breath. ¨ Nausea or vomiting. ¨ Pain, pressure, or a strange feeling in the back, neck, jaw, or upper belly or in one or both shoulders or arms. ¨ Lightheadedness or sudden weakness. ¨ A fast or irregular heartbeat. ? · You have symptoms of a stroke. These may include: 
¨ Sudden numbness, tingling, weakness, or loss of movement in your face, arm, or leg, especially on only one side of your body. ¨ Sudden vision changes. ¨ Sudden trouble speaking. ¨ Sudden confusion or trouble understanding simple statements. ¨ Sudden problems with walking or balance. ¨ A sudden, severe headache that is different from past headaches. ? · You have severe back or belly pain. ?Do not wait until your blood pressure comes down on its own. Get help right away. ?Call your doctor now or seek immediate care if: 
? · Your blood pressure is much higher than normal (such as 180/110 or higher), but you don't have symptoms. ? · You think high blood pressure is causing symptoms, such as: ¨ Severe headache. ¨ Blurry vision. ?Watch closely for changes in your health, and be sure to contact your doctor if: 
? · Your blood pressure measures 140/90 or higher at least 2 times. That means the top number is 140 or higher or the bottom number is 90 or higher, or both. ? · You think you may be having side effects from your blood pressure medicine. ? · Your blood pressure is usually normal, but it goes above normal at least 2 times. Where can you learn more? Go to http://rad-batool.info/. Enter C025 in the search box to learn more about \"High Blood Pressure: Care Instructions. \" Current as of: September 21, 2016 Content Version: 11.4 © 2479-8893 [employer]. Care instructions adapted under license by [employer] (which disclaims liability or warranty for this information). If you have questions about a medical condition or this instruction, always ask your healthcare professional. [place] [address] any warranty or liability for your use of this information. Hyperlipidemia: After Your Visit Your Care Instructions Hyperlipidemia is too much fat in your blood. The body has several kinds of fat, including cholesterol and triglycerides. Your body needs fat for many things, such as making new cells. But too much fat in your blood increases your chances of having a heart attack or stroke. You may be able to lower your cholesterol and triglycerides with a heart-healthy diet, exercise, and if needed, medicine. Your doctor may want you to try lifestyle changes first to see whether they lower the fat in your blood. You may need to take medicine if lifestyle changes do not lower the fat in your blood enough. Follow-up care is a key part of your treatment and safety. Be sure to make and go to all appointments, and call your doctor if you are having problems. Its also a good idea to know your test results and keep a list of the medicines you take. How can you care for yourself at home? Take your medicines · Take your medicines exactly as prescribed. Call your doctor if you think you are having a problem with your medicine. · If you take medicine to lower your cholesterol, go to follow-up visits. You will need to have blood tests. · Do not take large doses of niacin, which is a B vitamin, while taking medicine called statins. It may increase the chance of muscle pain and liver problems. · Talk to your doctor about avoiding grapefruit juice if you are taking statins. Grapefruit juice can raise the level of this medicine in your blood. This could increase side effects. Eat more fruits, vegetables, and fiber · Fruits and vegetables have lots of nutrients that help protect against heart disease, and they have littleif anyfat. Try to eat at least five servings a day. Dark green, deep orange, or yellow fruits and vegetables are healthy choices. · Keep carrots, celery, and other veggies handy for snacks. Buy fruit that is in season and store it where you can see it so that you will be tempted to eat it. Cook dishes that have a lot of veggies in them, such as stir-fries and soups. · Foods high in fiber may reduce your cholesterol and provide important vitamins and minerals. High-fiber foods include whole-grain cereals and breads, oatmeal, beans, brown rice, citrus fruits, and apples. · Buy whole-grain breads and cereals instead of white bread and pastries. Limit saturated fat · Read food labels and try to avoid saturated fat and trans fat. They increase your risk of heart disease. · Use olive or canola oil when you cook. Try cholesterol-lowering spreads, such as Benecol or Take Control. · Bake, broil, grill, or steam foods instead of frying them. · Limit the amount of high-fat meats you eat, including hot dogs and sausages. Cut out all visible fat when you prepare meat.  
· Eat fish, skinless poultry, and soy products such as tofu instead of high-fat meats. Soybeans may be especially good for your heart. Eat at least two servings of fish a week. Certain fish, such as salmon, contain omega-3 fatty acids, which may help reduce your risk of heart attack. · Choose low-fat or fat-free milk and dairy products. Get exercise, limit alcohol, and quit smoking · Get more exercise. Work with your doctor to set up an exercise program. Even if you can do only a small amount, exercise will help you get stronger, have more energy, and manage your weight and your stress. Walking is an easy way to get exercise. Gradually increase the amount you walk every day. Aim for at least 30 minutes on most days of the week. You also may want to swim, bike, or do other activities. · Limit alcohol to no more than 2 drinks a day for men and 1 drink a day for women. · Do not smoke. If you need help quitting, talk to your doctor about stop-smoking programs and medicines. These can increase your chances of quitting for good. When should you call for help? Call 911 anytime you think you may need emergency care. For example, call if: 
· You have symptoms of a heart attack. These may include: ¨ Chest pain or pressure, or a strange feeling in the chest. 
¨ Sweating. ¨ Shortness of breath. ¨ Nausea or vomiting. ¨ Pain, pressure, or a strange feeling in the back, neck, jaw, or upper belly or in one or both shoulders or arms. ¨ Lightheadedness or sudden weakness. ¨ A fast or irregular heartbeat. After you call 911, the  may tell you to chew 1 adult-strength or 2 to 4 low-dose aspirin. Wait for an ambulance. Do not try to drive yourself. · You have signs of a stroke. These may include: 
¨ Sudden numbness, paralysis, or weakness in your face, arm, or leg, especially on only one side of your body. ¨ New problems with walking or balance. ¨ Sudden vision changes. ¨ Drooling or slurred speech. ¨ New problems speaking or understanding simple statements, or feeling confused. ¨ A sudden, severe headache that is different from past headaches. · You passed out (lost consciousness). Call your doctor now or seek immediate medical care if: 
· You have muscle pain or weakness. Watch closely for changes in your health, and be sure to contact your doctor if: 
· You are very tired. · You have an upset stomach, gas, constipation, or belly pain or cramps. Where can you learn more? Go to [employer].be Enter C406 in the search box to learn more about \"Hyperlipidemia: After Your Visit. \"  
© 8642-4039 Healthwise[employer] Incorporated. Care instructions adapted under license by Carmelita Fothergill (which disclaims liability or warranty for this information). This care instruction is for use with your licensed healthcare professional. If you have questions about a medical condition or this instruction, always ask your healthcare professional. [address] any warranty or liability for your use of this information. Content Version: 2.4.258376; Last Revised: October 13, 2011 Introducing [facility]! Dear Floyd Chou: Thank you for requesting a [employer] account. Our records indicate that you already have an active [employer] account. You can access your account anytime at [email or web address] Did you know that you can access your hospital and ER discharge instructions at any time in [employer]? You can also review all of your test results from your hospital stay or ER visit. Additional Information If you have questions, please visit the Frequently Asked Questions section of the [employer] website at [email or web address] Remember, [employer] is NOT to be used for urgent needs. For medical emergencies, dial 911. Now available from your iPhone and Android! Please provide this summary of care documentation to your next provider. Your primary care clinician is listed as Harley Summers. If you have any questions after today's visit, please call [phone].

## 2018-03-25 RX ORDER — METOPROLOL SUCCINATE 100 MG/1
TABLET, EXTENDED RELEASE ORAL
Qty: 30 TAB | Refills: 0 | Status: SHIPPED | OUTPATIENT
Start: 2018-03-25 | End: 2018-04-24 | Stop reason: SDUPTHER

## 2018-04-17 RX ORDER — SIMVASTATIN 5 MG/1
TABLET, FILM COATED ORAL
Qty: 30 TAB | Refills: 1 | Status: SHIPPED | OUTPATIENT
Start: 2018-04-17 | End: 2018-04-19 | Stop reason: SDUPTHER

## 2018-04-20 RX ORDER — HYDROCHLOROTHIAZIDE 12.5 MG/1
TABLET ORAL
Qty: 90 TAB | Refills: 0 | Status: SHIPPED | OUTPATIENT
Start: 2018-04-20 | End: 2018-07-11 | Stop reason: SDUPTHER

## 2018-04-20 RX ORDER — SIMVASTATIN 5 MG/1
TABLET, FILM COATED ORAL
Qty: 90 TAB | Refills: 0 | Status: SHIPPED | OUTPATIENT
Start: 2018-04-20 | End: 2018-06-05 | Stop reason: DRUGHIGH

## 2018-04-24 RX ORDER — METOPROLOL SUCCINATE 100 MG/1
TABLET, EXTENDED RELEASE ORAL
Qty: 30 TAB | Refills: 0 | Status: SHIPPED | OUTPATIENT
Start: 2018-04-24 | End: 2018-05-01 | Stop reason: SDUPTHER

## 2018-04-25 ENCOUNTER — HOSPITAL ENCOUNTER (OUTPATIENT)
Dept: LAB | Age: 73
Discharge: HOME | End: 2018-04-25
Payer: MEDICARE

## 2018-04-25 DIAGNOSIS — E78.00 PURE HYPERCHOLESTEROLEMIA: ICD-10-CM

## 2018-04-25 LAB
CHOLEST SERPL-MCNC: 194 MG/DL
HDLC SERPL-MCNC: 58 MG/DL (ref 40–60)
HDLC SERPL: 3.3 {RATIO} (ref 0–5)
LDLC SERPL CALC-MCNC: 126 MG/DL (ref 0–100)
LIPID PROFILE,FLP: ABNORMAL
TRIGL SERPL-MCNC: 50 MG/DL (ref ?–150)
VLDLC SERPL CALC-MCNC: 10 MG/DL

## 2018-04-25 PROCEDURE — 36415 COLL VENOUS BLD VENIPUNCTURE: CPT | Performed by: PHYSICIAN ASSISTANT

## 2018-04-25 PROCEDURE — 80061 LIPID PANEL: CPT | Performed by: PHYSICIAN ASSISTANT

## 2018-05-01 RX ORDER — METOPROLOL SUCCINATE 100 MG/1
TABLET, EXTENDED RELEASE ORAL
Qty: 30 TAB | Refills: 0 | Status: SHIPPED | OUTPATIENT
Start: 2018-05-01 | End: 2018-06-24 | Stop reason: SDUPTHER

## 2018-05-02 ENCOUNTER — OFFICE VISIT (OUTPATIENT)
Dept: FAMILY MEDICINE CLINIC | Age: 73
End: 2018-05-02

## 2018-05-02 VITALS
HEART RATE: 67 BPM | RESPIRATION RATE: 16 BRPM | TEMPERATURE: 98 F | BODY MASS INDEX: 26.47 KG/M2 | HEIGHT: 63 IN | WEIGHT: 149.4 LBS | DIASTOLIC BLOOD PRESSURE: 82 MMHG | SYSTOLIC BLOOD PRESSURE: 132 MMHG | OXYGEN SATURATION: 98 %

## 2018-05-02 DIAGNOSIS — I10 ESSENTIAL HYPERTENSION, BENIGN: ICD-10-CM

## 2018-05-02 DIAGNOSIS — F43.9 SITUATIONAL STRESS: ICD-10-CM

## 2018-05-02 DIAGNOSIS — E78.00 PURE HYPERCHOLESTEROLEMIA: Primary | ICD-10-CM

## 2018-05-02 NOTE — PATIENT INSTRUCTIONS
Hyperlipidemia: After Your Visit  Your Care Instructions  Hyperlipidemia is too much fat in your blood. The body has several kinds of fat, including cholesterol and triglycerides. Your body needs fat for many things, such as making new cells. But too much fat in your blood increases your chances of having a heart attack or stroke. You may be able to lower your cholesterol and triglycerides with a heart-healthy diet, exercise, and if needed, medicine. Your doctor may want you to try lifestyle changes first to see whether they lower the fat in your blood. You may need to take medicine if lifestyle changes do not lower the fat in your blood enough. Follow-up care is a key part of your treatment and safety. Be sure to make and go to all appointments, and call your doctor if you are having problems. Its also a good idea to know your test results and keep a list of the medicines you take. How can you care for yourself at home? Take your medicines  · Take your medicines exactly as prescribed. Call your doctor if you think you are having a problem with your medicine. · If you take medicine to lower your cholesterol, go to follow-up visits. You will need to have blood tests. · Do not take large doses of niacin, which is a B vitamin, while taking medicine called statins. It may increase the chance of muscle pain and liver problems. · Talk to your doctor about avoiding grapefruit juice if you are taking statins. Grapefruit juice can raise the level of this medicine in your blood. This could increase side effects. Eat more fruits, vegetables, and fiber  · Fruits and vegetables have lots of nutrients that help protect against heart disease, and they have little--if any--fat. Try to eat at least five servings a day. Dark green, deep orange, or yellow fruits and vegetables are healthy choices. · Keep carrots, celery, and other veggies handy for snacks.  Buy fruit that is in season and store it where you can see it so that you will be tempted to eat it. Cook dishes that have a lot of veggies in them, such as stir-fries and soups. · Foods high in fiber may reduce your cholesterol and provide important vitamins and minerals. High-fiber foods include whole-grain cereals and breads, oatmeal, beans, brown rice, citrus fruits, and apples. · Buy whole-grain breads and cereals instead of white bread and pastries. Limit saturated fat  · Read food labels and try to avoid saturated fat and trans fat. They increase your risk of heart disease. · Use olive or canola oil when you cook. Try cholesterol-lowering spreads, such as Benecol or Take Control. · Bake, broil, grill, or steam foods instead of frying them. · Limit the amount of high-fat meats you eat, including hot dogs and sausages. Cut out all visible fat when you prepare meat. · Eat fish, skinless poultry, and soy products such as tofu instead of high-fat meats. Soybeans may be especially good for your heart. Eat at least two servings of fish a week. Certain fish, such as salmon, contain omega-3 fatty acids, which may help reduce your risk of heart attack. · Choose low-fat or fat-free milk and dairy products. Get exercise, limit alcohol, and quit smoking  · Get more exercise. Work with your doctor to set up an exercise program. Even if you can do only a small amount, exercise will help you get stronger, have more energy, and manage your weight and your stress. Walking is an easy way to get exercise. Gradually increase the amount you walk every day. Aim for at least 30 minutes on most days of the week. You also may want to swim, bike, or do other activities. · Limit alcohol to no more than 2 drinks a day for men and 1 drink a day for women. · Do not smoke. If you need help quitting, talk to your doctor about stop-smoking programs and medicines. These can increase your chances of quitting for good. When should you call for help?   Call 911 anytime you think you may need emergency care. For example, call if:  · You have symptoms of a heart attack. These may include:  ¨ Chest pain or pressure, or a strange feeling in the chest.  ¨ Sweating. ¨ Shortness of breath. ¨ Nausea or vomiting. ¨ Pain, pressure, or a strange feeling in the back, neck, jaw, or upper belly or in one or both shoulders or arms. ¨ Lightheadedness or sudden weakness. ¨ A fast or irregular heartbeat. After you call 911, the  may tell you to chew 1 adult-strength or 2 to 4 low-dose aspirin. Wait for an ambulance. Do not try to drive yourself. · You have signs of a stroke. These may include:  ¨ Sudden numbness, paralysis, or weakness in your face, arm, or leg, especially on only one side of your body. ¨ New problems with walking or balance. ¨ Sudden vision changes. ¨ Drooling or slurred speech. ¨ New problems speaking or understanding simple statements, or feeling confused. ¨ A sudden, severe headache that is different from past headaches. · You passed out (lost consciousness). Call your doctor now or seek immediate medical care if:  · You have muscle pain or weakness. Watch closely for changes in your health, and be sure to contact your doctor if:  · You are very tired. · You have an upset stomach, gas, constipation, or belly pain or cramps. Where can you learn more? Go to [employer].be  Enter C406 in the search box to learn more about \"Hyperlipidemia: After Your Visit. \"   © 4124-7442 Healthwise, Incorporated. Care instructions adapted under license by [employer] (which disclaims liability or warranty for this information). This care instruction is for use with your licensed healthcare professional. If you have questions about a medical condition or this instruction, always ask your healthcare professional. [place] [address] any warranty or liability for your use of this information.   Content Version: 2.3.457843; Last Revised: October 13, 2011

## 2018-05-02 NOTE — MR AVS SNAPSHOT
[facility] 
 
 
 [address] [address] [address] [id number] [phone] Patient: Monica Jackson MRN: [id number] NDQ:5/69/7989 Visit Information Date & Time Provider Department Dept. Phone Encounter #  
 5/2/2018  8:30 AM Javier Davis PA-C [facility] [phone] [id number] Follow-up Instructions Return in about 4 months (around 9/2/2018) for one week prior for labs. Upcoming Health Maintenance Date Due Pneumococcal 65+ Low/Medium Risk (2 of 2 - PPSV23) 4/26/2018 GLAUCOMA SCREENING Q2Y 4/28/2018 MEDICARE YEARLY EXAM 10/26/2018 BREAST CANCER SCRN MAMMOGRAM 5/22/2019 COLONOSCOPY 5/1/2025 DTaP/Tdap/Td series (2 - Td) 1/24/2028 Allergies as of 5/2/2018  Review Complete On: 5/2/2018 By: Javier Davis PA-C Severity Noted Reaction Type Reactions Naproxen  04/06/2010    Palpitations Current Immunizations  Reviewed on 6/26/2014 Name Date Influenza High Dose Vaccine PF 10/25/2017 Not reviewed this visit You Were Diagnosed With   
  
 Codes Comments Pure hypercholesterolemia    -  Primary ICD-10-CM: E78.00 ICD-9-CM: 272.0 Essential hypertension, benign     ICD-10-CM: I10 
ICD-9-CM: 401.1 Vitals BP Pulse Temp Resp Height(growth percentile) Weight(growth percentile) 132/82 (BP 1 Location: Left arm) 67 98 °F (36.7 °C) (Oral) 16 5' 3\" (1.6 m) 149 lb 6.4 oz (67.8 kg) SpO2 BMI OB Status Smoking Status 98% 26.47 kg/m2 Hysterectomy Former Smoker BMI and BSA Data Body Mass Index Body Surface Area  
 26.47 kg/m 2 1.74 m 2 Preferred Pharmacy Pharmacy Name Phone [facility] [address], [address] [place] [phone] Your Updated Medication List  
  
   
This list is accurate as of 5/2/18  9:14 AM.  Always use your most recent med list.  
  
  
  
  
 aspirin delayed-release 81 mg tablet Take 81 mg by mouth every other day. Does not take daily FISH OIL 1,000 mg Cap Generic drug:  omega-3 fatty acids-vitamin e Take 1 Cap by mouth daily. hydroCHLOROthiazide 12.5 mg tablet Commonly known as:  HYDRODIURIL  
TAKE ONE TABLET BY MOUTH ONCE DAILY  
  
 metoprolol succinate 100 mg tablet Commonly known as:  TOPROL-XL  
TAKE 1 TABLET BY MOUTH ONCE DAILY PriLOSEC 20 mg capsule Generic drug:  omeprazole Take 20 mg by mouth daily. simvastatin 5 mg tablet Commonly known as:  ZOCOR  
TAKE ONE TABLET BY MOUTH ONCE NIGHTLY  
  
 VITAMIN D 2,000 unit Cap capsule Generic drug:  Cholecalciferol (Vitamin D3) Take  by mouth daily. Follow-up Instructions Return in about 4 months (around 9/2/2018) for one week prior for labs. Patient Instructions Hyperlipidemia: After Your Visit Your Care Instructions Hyperlipidemia is too much fat in your blood. The body has several kinds of fat, including cholesterol and triglycerides. Your body needs fat for many things, such as making new cells. But too much fat in your blood increases your chances of having a heart attack or stroke. You may be able to lower your cholesterol and triglycerides with a heart-healthy diet, exercise, and if needed, medicine. Your doctor may want you to try lifestyle changes first to see whether they lower the fat in your blood. You may need to take medicine if lifestyle changes do not lower the fat in your blood enough. Follow-up care is a key part of your treatment and safety. Be sure to make and go to all appointments, and call your doctor if you are having problems. Its also a good idea to know your test results and keep a list of the medicines you take. How can you care for yourself at home? Take your medicines · Take your medicines exactly as prescribed. Call your doctor if you think you are having a problem with your medicine. · If you take medicine to lower your cholesterol, go to follow-up visits. You will need to have blood tests. · Do not take large doses of niacin, which is a B vitamin, while taking medicine called statins. It may increase the chance of muscle pain and liver problems. · Talk to your doctor about avoiding grapefruit juice if you are taking statins. Grapefruit juice can raise the level of this medicine in your blood. This could increase side effects. Eat more fruits, vegetables, and fiber · Fruits and vegetables have lots of nutrients that help protect against heart disease, and they have littleif anyfat. Try to eat at least five servings a day. Dark green, deep orange, or yellow fruits and vegetables are healthy choices. · Keep carrots, celery, and other veggies handy for snacks. Buy fruit that is in season and store it where you can see it so that you will be tempted to eat it. Cook dishes that have a lot of veggies in them, such as stir-fries and soups. · Foods high in fiber may reduce your cholesterol and provide important vitamins and minerals. High-fiber foods include whole-grain cereals and breads, oatmeal, beans, brown rice, citrus fruits, and apples. · Buy whole-grain breads and cereals instead of white bread and pastries. Limit saturated fat · Read food labels and try to avoid saturated fat and trans fat. They increase your risk of heart disease. · Use olive or canola oil when you cook. Try cholesterol-lowering spreads, such as Benecol or Take Control. · Bake, broil, grill, or steam foods instead of frying them. · Limit the amount of high-fat meats you eat, including hot dogs and sausages. Cut out all visible fat when you prepare meat. · Eat fish, skinless poultry, and soy products such as tofu instead of high-fat meats. Soybeans may be especially good for your heart. Eat at least two servings of fish a week.  Certain fish, such as salmon, contain omega-3 fatty acids, which may help reduce your risk of heart attack. · Choose low-fat or fat-free milk and dairy products. Get exercise, limit alcohol, and quit smoking · Get more exercise. Work with your doctor to set up an exercise program. Even if you can do only a small amount, exercise will help you get stronger, have more energy, and manage your weight and your stress. Walking is an easy way to get exercise. Gradually increase the amount you walk every day. Aim for at least 30 minutes on most days of the week. You also may want to swim, bike, or do other activities. · Limit alcohol to no more than 2 drinks a day for men and 1 drink a day for women. · Do not smoke. If you need help quitting, talk to your doctor about stop-smoking programs and medicines. These can increase your chances of quitting for good. When should you call for help? Call 911 anytime you think you may need emergency care. For example, call if: 
· You have symptoms of a heart attack. These may include: ¨ Chest pain or pressure, or a strange feeling in the chest. 
¨ Sweating. ¨ Shortness of breath. ¨ Nausea or vomiting. ¨ Pain, pressure, or a strange feeling in the back, neck, jaw, or upper belly or in one or both shoulders or arms. ¨ Lightheadedness or sudden weakness. ¨ A fast or irregular heartbeat. After you call 911, the  may tell you to chew 1 adult-strength or 2 to 4 low-dose aspirin. Wait for an ambulance. Do not try to drive yourself. · You have signs of a stroke. These may include: 
¨ Sudden numbness, paralysis, or weakness in your face, arm, or leg, especially on only one side of your body. ¨ New problems with walking or balance. ¨ Sudden vision changes. ¨ Drooling or slurred speech. ¨ New problems speaking or understanding simple statements, or feeling confused. ¨ A sudden, severe headache that is different from past headaches. · You passed out (lost consciousness). Call your doctor now or seek immediate medical care if: 
· You have muscle pain or weakness. Watch closely for changes in your health, and be sure to contact your doctor if: 
· You are very tired. · You have an upset stomach, gas, constipation, or belly pain or cramps. Where can you learn more? Go to [employer] Enter C406 in the search box to learn more about \"Hyperlipidemia: After Your Visit. \"  
© 9315-3972 Healthwise, Incorporated. Care instructions adapted under license by Nidia Drummond (which disclaims liability or warranty for this information). This care instruction is for use with your licensed healthcare professional. If you have questions about a medical condition or this instruction, always ask your healthcare professional. [address] any warranty or liability for your use of this information. Content Version: 3.7.837908; Last Revised: October 13, 2011 Introducing [facility]! Dear Phyllis Martinez: Thank you for requesting a [employer] account. Our records indicate that you already have an active [employer] account. You can access your account anytime at [email or web address] Did you know that you can access your hospital and ER discharge instructions at any time in Vict[employer]? You can also review all of your test results from your hospital stay or ER visit. Additional Information If you have questions, please visit the Frequently Asked Questions section of the [employer] website at [email or web address] Remember, [employer] is NOT to be used for urgent needs. For medical emergencies, dial 911. Now available from your iPhone and Android! Please provide this summary of care documentation to your next provider. Your primary care clinician is listed as Madai Mccurdy. If you have any questions after today's visit, please call [phone].

## 2018-05-02 NOTE — PROGRESS NOTES
HPI:    Monica Jackson  is a 81 y.o.  female  patient who comes in today for follow up on lipids. She is taking the zocor and still eating well. She has lost 3 pounds. She has not been exercising as much. Patient denies SOB, CP, dizziness, headache, no claudication, palpitations. Current Outpatient Prescriptions   Medication Sig Dispense Refill    metoprolol succinate (TOPROL-XL) 100 mg tablet TAKE 1 TABLET BY MOUTH ONCE DAILY 30 Tab 0    simvastatin (ZOCOR) 5 mg tablet TAKE ONE TABLET BY MOUTH ONCE NIGHTLY 90 Tab 0    hydroCHLOROthiazide (HYDRODIURIL) 12.5 mg tablet TAKE ONE TABLET BY MOUTH ONCE DAILY 90 Tab 0    omeprazole (PRILOSEC) 20 mg capsule Take 20 mg by mouth daily.  aspirin delayed-release 81 mg tablet Take 81 mg by mouth every other day. Does not take daily      omega-3 fatty acids-vitamin e (FISH OIL) 1,000 mg Cap Take 1 Cap by mouth daily.  Cholecalciferol, Vitamin D3, (VITAMIN D) 2,000 unit Cap Take  by mouth daily.         Allergies   Allergen Reactions    Naproxen Palpitations      Past Medical History:   Diagnosis Date    Fibrocystic disease of breast     GERD (gastroesophageal reflux disease)     Hx of dizziness     Hypercholesterolemia     Hypertension     PND (paroxysmal nocturnal dyspnea)     Evaluated by Dr. Fani Garcia (March 2012)    Vaginitis atrophic 7-29-09    Vitamin D deficiency 4-2009      Family History   Problem Relation Age of Onset    Hypertension Mother     Arthritis-osteo Mother     Heart Attack Father     Hypertension Sister     High Cholesterol Sister     Hypertension Brother       Patient Active Problem List   Diagnosis Code    Essential hypertension, benign I10    Pure hypercholesterolemia E78.00    GERD (gastroesophageal reflux disease) K21.9    Vitamin D deficiency E55.9    PND (paroxysmal nocturnal dyspnea) R06.00    Pain in heel M79.673    Situational stress F43.9    Influenza vaccination declined Z28.21    23-polyvalent pneumococcal polysaccharide vaccine declined Z28.21    Overweight (BMI 25.0-29. 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 HENT:   Head: Normocephalic and atraumatic. Neck: Normal range of motion. Neck supple. Cardiovascular: Normal rate, regular rhythm, normal heart sounds and intact distal pulses. Pulmonary/Chest: Effort normal and breath sounds normal.   Abdominal: Soft. Bowel sounds are normal.   Neurological: She is alert and oriented to person, place, and time. Vitals reviewed.            Lab Results   Component Value Date/Time    Cholesterol, total 194 04/25/2018 07:42 AM    Cholesterol, total 181 02/28/2018 08:45 AM    Cholesterol, total 208 (H) 01/17/2018 08:24 AM    Cholesterol, total 190 10/18/2017 08:17 AM    Cholesterol, total 181 04/17/2017 08:14 AM    HDL Cholesterol 58 04/25/2018 07:42 AM    HDL Cholesterol 62 (H) 02/28/2018 08:45 AM    HDL Cholesterol 54 01/17/2018 08:24 AM    HDL Cholesterol 62 (H) 10/18/2017 08:17 AM    HDL Cholesterol 60 04/17/2017 08:14 AM    LDL, calculated 126 (H) 04/25/2018 07:42 AM    LDL, calculated 109.8 (H) 02/28/2018 08:45 AM    LDL, calculated 141 (H) 01/17/2018 08:24 AM    LDL, calculated 118.8 (H) 10/18/2017 08:17 AM    LDL, calculated 114 (H) 04/17/2017 08:14 AM    Triglyceride 50 04/25/2018 07:42 AM    Triglyceride 46 02/28/2018 08:45 AM    Triglyceride 63 01/17/2018 08:24 AM    Triglyceride 46 10/18/2017 08:17 AM    Triglyceride 36 04/17/2017 08:14 AM    CHOL/HDL Ratio 3.3 04/25/2018 07:42 AM    CHOL/HDL Ratio 2.9 02/28/2018 08:45 AM    CHOL/HDL Ratio 3.1 10/18/2017 08:17 AM    CHOL/HDL Ratio 3.0 08/04/2010 06:07 PM    CHOL/HDL Ratio 2.9 03/11/2010 08:44 AM         Assessment/Plan:    Diagnoses and all orders for this visit:    1. Pure hypercholesterolemia - increase exercise times to 6/7 days and increase zocor to 10mg. Patient agreeable to plan. No side effects from medication. 2. Essential hypertension, benign - well controlled, cont current regimen. 3.  Situational Stress - Patient still struggling with 's choices to not help himself. He is no longer bathing. He is very adamant about not taking medication or going out of the house. Encouraged patient to reach out to his primary care and [employer] [employer]. She has contacted [facility] for help. Follow-up Disposition:  Return in about 4 months (around 9/2/2018) for one week prior for labs. Additional Notes: Discussed today's diagnosis, treatment plans. Discussed medication indications and side effects. Preventive Medicine:   Weight Management:  Mediterranean diet recommended as well as exercise for 30 minutes daily most days of the week. DASH diet info given. After Visit Summary: Provided and discussed printed patient instructions. Answered all questions and patient acknowledged understanding. Marialuisa Patrick PA-C   [employer]. I reviewed the patient's medical history, the physician assistant's findings on physical examination, the patient's diagnoses, and treatment plan as documented in the progress note. I concur with the treatment plan as documented. There are no additional recommendations at this time.     Jesus Bello MD

## 2018-05-02 NOTE — PROGRESS NOTES
Pt is here for 8 week f/u on cholesterol    1. Have you been to the ER, urgent care clinic since your last visit? Hospitalized since your last visit? No    2. Have you seen or consulted any other health care providers outside of the [facility] since your last visit? Include any pap smears or colon screening.  No

## 2018-06-05 RX ORDER — SIMVASTATIN 10 MG/1
10 TABLET, FILM COATED ORAL
Qty: 90 TAB | Refills: 0 | Status: SHIPPED | OUTPATIENT
Start: 2018-06-05 | End: 2018-08-01 | Stop reason: SDUPTHER

## 2018-06-05 RX ORDER — SIMVASTATIN 5 MG/1
TABLET, FILM COATED ORAL
Qty: 90 TAB | Refills: 0 | Status: CANCELLED | OUTPATIENT
Start: 2018-06-05

## 2018-06-05 NOTE — TELEPHONE ENCOUNTER
Pt called to request the simvastatin medication and states that she was instructed on 5/2 to increase. Please edit script and advise pt once completed. Pt is completely out of medication.     Requested Prescriptions     Pending Prescriptions Disp Refills    simvastatin (ZOCOR) 5 mg tablet 90 Tab 0     Sig: TAKE ONE TABLET BY MOUTH ONCE NIGHTLY

## 2018-06-25 RX ORDER — METOPROLOL SUCCINATE 100 MG/1
TABLET, EXTENDED RELEASE ORAL
Qty: 30 TAB | Refills: 0 | Status: SHIPPED | OUTPATIENT
Start: 2018-06-25 | End: 2018-07-20 | Stop reason: SDUPTHER

## 2018-07-11 RX ORDER — HYDROCHLOROTHIAZIDE 12.5 MG/1
TABLET ORAL
Qty: 90 TAB | Refills: 0 | Status: SHIPPED | OUTPATIENT
Start: 2018-07-11 | End: 2018-08-01 | Stop reason: SDUPTHER

## 2018-07-20 RX ORDER — METOPROLOL SUCCINATE 100 MG/1
TABLET, EXTENDED RELEASE ORAL
Qty: 30 TAB | Refills: 0 | Status: SHIPPED | OUTPATIENT
Start: 2018-07-20 | End: 2018-08-01 | Stop reason: SDUPTHER

## 2018-07-23 ENCOUNTER — HOSPITAL ENCOUNTER (OUTPATIENT)
Dept: MAMMOGRAPHY | Age: 73
Discharge: HOME | End: 2018-07-23
Attending: PHYSICIAN ASSISTANT
Payer: MEDICARE

## 2018-07-23 DIAGNOSIS — Z12.39 BREAST CANCER SCREENING: ICD-10-CM

## 2018-07-23 PROCEDURE — 77063 BREAST TOMOSYNTHESIS BI: CPT

## 2018-07-24 NOTE — PROGRESS NOTES
Great news! Your mammogram was negative. A negative mammogram means that the radiologist did not see any abnormalities in the breast tissue on the films. In general, you should continue with monthly self-examinations, regular examinations by your medical provider, and have another mammogram within a year or two, depending on the guidelines suggested by your age and your personal physician. However, if mammography was scheduled in order to evaluate a breast change noted during a physical examination, the radiologist or your medical provider may recommend further testing (such as breast ultrasound) to evaluate and diagnose that finding. Unfortunately, mammography is not a perfect test; it does not detect all breast cancers either palpable or nonpalpable. This is particularly true if your breast tissue is very dense. In this situation, a small percentage (5% to 10%) of cancers that can be felt on physical examination are not visible on mammograms. It is important not to ignore a new breast problem, such as a lump, even if you discover it shortly after a negative mammogram. Always notify your health care provider in case another mammogram, a different test (such as a breast ultrasound), or referral to a physician who specializes in breast problems is necessary.

## 2018-08-01 RX ORDER — METOPROLOL SUCCINATE 100 MG/1
TABLET, EXTENDED RELEASE ORAL
Qty: 90 TAB | Refills: 1 | Status: SHIPPED | OUTPATIENT
Start: 2018-08-01 | End: 2018-12-10 | Stop reason: SDUPTHER

## 2018-08-01 RX ORDER — HYDROCHLOROTHIAZIDE 12.5 MG/1
TABLET ORAL
Qty: 90 TAB | Refills: 1 | Status: SHIPPED | OUTPATIENT
Start: 2018-08-01 | End: 2019-02-11 | Stop reason: SDUPTHER

## 2018-08-01 RX ORDER — SIMVASTATIN 10 MG/1
10 TABLET, FILM COATED ORAL
Qty: 90 TAB | Refills: 1 | Status: SHIPPED | OUTPATIENT
Start: 2018-08-01 | End: 2018-12-10 | Stop reason: SDUPTHER

## 2018-09-05 ENCOUNTER — HOSPITAL ENCOUNTER (OUTPATIENT)
Dept: LAB | Age: 73
Discharge: HOME | End: 2018-09-05

## 2018-09-05 ENCOUNTER — LAB ONLY (OUTPATIENT)
Dept: FAMILY MEDICINE CLINIC | Age: 73
End: 2018-09-05

## 2018-09-05 DIAGNOSIS — I10 ESSENTIAL HYPERTENSION, BENIGN: ICD-10-CM

## 2018-09-05 DIAGNOSIS — E78.00 PURE HYPERCHOLESTEROLEMIA: ICD-10-CM

## 2018-09-05 DIAGNOSIS — E55.9 VITAMIN D DEFICIENCY: Primary | ICD-10-CM

## 2018-09-05 PROCEDURE — 99001 SPECIMEN HANDLING PT-LAB: CPT | Performed by: INTERNAL MEDICINE

## 2018-09-06 LAB
25(OH)D3+25(OH)D2 SERPL-MCNC: 58.8 NG/ML (ref 30–100)
ALBUMIN SERPL-MCNC: 3.9 G/DL (ref 3.5–4.8)
ALBUMIN/GLOB SERPL: 1.3 {RATIO} (ref 1.2–2.2)
ALP SERPL-CCNC: 48 IU/L (ref 39–117)
ALT SERPL-CCNC: 8 IU/L (ref 0–32)
AST SERPL-CCNC: 14 IU/L (ref 0–40)
BILIRUB SERPL-MCNC: 0.3 MG/DL (ref 0–1.2)
BUN SERPL-MCNC: 14 MG/DL (ref 8–27)
BUN/CREAT SERPL: 15 (ref 12–28)
CALCIUM SERPL-MCNC: 9.5 MG/DL (ref 8.7–10.3)
CHLORIDE SERPL-SCNC: 102 MMOL/L (ref 96–106)
CHOLEST SERPL-MCNC: 202 MG/DL (ref 100–199)
CO2 SERPL-SCNC: 27 MMOL/L (ref 20–29)
CREAT SERPL-MCNC: 0.93 MG/DL (ref 0.57–1)
GLOBULIN SER CALC-MCNC: 3 G/DL (ref 1.5–4.5)
GLUCOSE SERPL-MCNC: 79 MG/DL (ref 65–99)
HDLC SERPL-MCNC: 60 MG/DL
LDLC SERPL CALC-MCNC: 131 MG/DL (ref 0–99)
POTASSIUM SERPL-SCNC: 4.1 MMOL/L (ref 3.5–5.2)
PROT SERPL-MCNC: 6.9 G/DL (ref 6–8.5)
SODIUM SERPL-SCNC: 140 MMOL/L (ref 134–144)
TRIGL SERPL-MCNC: 57 MG/DL (ref 0–149)
VLDLC SERPL CALC-MCNC: 11 MG/DL (ref 5–40)

## 2018-09-21 ENCOUNTER — OFFICE VISIT (OUTPATIENT)
Dept: FAMILY MEDICINE CLINIC | Age: 73
End: 2018-09-21

## 2018-09-21 VITALS
BODY MASS INDEX: 26.19 KG/M2 | WEIGHT: 147.8 LBS | TEMPERATURE: 97.6 F | SYSTOLIC BLOOD PRESSURE: 130 MMHG | HEIGHT: 63 IN | HEART RATE: 63 BPM | OXYGEN SATURATION: 99 % | RESPIRATION RATE: 20 BRPM | DIASTOLIC BLOOD PRESSURE: 76 MMHG

## 2018-09-21 DIAGNOSIS — K21.9 GASTROESOPHAGEAL REFLUX DISEASE, ESOPHAGITIS PRESENCE NOT SPECIFIED: ICD-10-CM

## 2018-09-21 DIAGNOSIS — I10 ESSENTIAL HYPERTENSION, BENIGN: Primary | ICD-10-CM

## 2018-09-21 DIAGNOSIS — E55.9 VITAMIN D DEFICIENCY: ICD-10-CM

## 2018-09-21 DIAGNOSIS — E78.00 PURE HYPERCHOLESTEROLEMIA: ICD-10-CM

## 2018-09-21 NOTE — PROGRESS NOTES
Chief Complaint   Patient presents with    Hypertension    Cholesterol Problem     1. Have you been to the ER, urgent care clinic since your last visit? Hospitalized since your last visit? No    2. Have you seen or consulted any other health care providers outside of the [address] [place] [address] since your last visit? Include any pap smears or colon screening.  No

## 2018-09-21 NOTE — PATIENT INSTRUCTIONS

## 2018-09-21 NOTE — PROGRESS NOTES
Subjective:   Wendy Hastings is a 72 y.o. female with hypertension presents for 3 months follow up. Patient Active Problem List   Diagnosis Code    Essential hypertension, benign I10    Pure hypercholesterolemia E78.00    GERD (gastroesophageal reflux disease) K21.9    Vitamin D deficiency E55.9    PND (paroxysmal nocturnal dyspnea) R06.00    Pain in heel M79.673    Situational stress F43.9    Influenza vaccination declined Z28.21    23-polyvalent pneumococcal polysaccharide vaccine declined Z28.21    Overweight (BMI 25.0-29. 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 90 Tab 1    hydroCHLOROthiazide (HYDRODIURIL) 12.5 mg tablet TAKE 1 TABLET BY MOUTH ONCE DAILY 90 Tab 1    omeprazole (PRILOSEC) 20 mg capsule Take 20 mg by mouth daily.  aspirin delayed-release 81 mg tablet Take 81 mg by mouth every other day. Does not take daily      omega-3 fatty acids-vitamin e (FISH OIL) 1,000 mg Cap Take 1 Cap by mouth daily.  Cholecalciferol, Vitamin D3, (VITAMIN D) 2,000 unit Cap Take  by mouth daily.         Allergies   Allergen Reactions    Naproxen Palpitations     Past Surgical History:   Procedure Laterality Date    HX HYSTERECTOMY  1960's    [place] COLONOSCOPY FLX DX W/COLLJ SPEC WHEN PFRMD  2004    normal ; Dr. Cordelia Hickman     Family History   Problem Relation Age of Onset    Hypertension Mother     Arthritis-osteo Mother     Heart Attack Father     Hypertension Sister     High Cholesterol Sister     Breast Cancer Sister     Hypertension Brother       Lab Results   Component Value Date/Time    Cholesterol, total 202 (H) 09/05/2018 08:10 AM    HDL Cholesterol 60 09/05/2018 08:10 AM    LDL, calculated 131 (H) 09/05/2018 08:10 AM    VLDL, calculated 11 09/05/2018 08:10 AM    Triglyceride 57 09/05/2018 08:10 AM    CHOL/HDL Ratio 3.3 04/25/2018 07:42 AM     Lab Results   Component Value Date/Time    Sodium 140 09/05/2018 08:10 AM    Potassium 4.1 09/05/2018 08:10 AM    Chloride 102 09/05/2018 08:10 AM    CO2 27 09/05/2018 08:10 AM    Anion gap 8 10/18/2017 08:17 AM    Glucose 79 09/05/2018 08:10 AM    BUN 14 09/05/2018 08:10 AM    Creatinine 0.93 09/05/2018 08:10 AM    BUN/Creatinine ratio 15 09/05/2018 08:10 AM    GFR est AA 72 09/05/2018 08:10 AM    GFR est non-AA 62 09/05/2018 08:10 AM    Calcium 9.5 09/05/2018 08:10 AM    Bilirubin, total 0.3 09/05/2018 08:10 AM    AST (SGOT) 14 09/05/2018 08:10 AM    Alk. phosphatase 48 09/05/2018 08:10 AM    Protein, total 6.9 09/05/2018 08:10 AM    Albumin 3.9 09/05/2018 08:10 AM    Globulin 3.4 10/18/2017 08:17 AM    A-G Ratio 1.3 09/05/2018 08:10 AM    ALT (SGPT) 8 09/05/2018 08:10 AM     Lab Results   Component Value Date/Time    WBC 6.0 01/17/2018 08:24 AM    HGB 13.0 01/17/2018 08:24 AM    HCT 41.4 01/17/2018 08:24 AM    PLATELET 987 32/35/9741 08:24 AM    MCV 83 01/17/2018 08:24 AM     Wt Readings from Last 3 Encounters:   09/21/18 147 lb 12.8 oz (67 kg)   05/02/18 149 lb 6.4 oz (67.8 kg)   03/07/18 152 lb 3.2 oz (69 kg)     Last Point of Care HGB A1C  No results found for: VLT2GVPI   BP Readings from Last 3 Encounters:   09/21/18 147/76   05/02/18 132/82   03/07/18 135/85       Hypertension ROS: taking medications as instructed, no medication side effects noted, no TIA's, no chest pain on exertion, no dyspnea on exertion, no swelling of ankles. New concerns: none.      Objective:   Visit Vitals    /76 (BP 1 Location: Left arm, BP Patient Position: Sitting)    Pulse 63    Temp 97.6 °F (36.4 °C) (Oral)    Resp 20    Ht 5' 3\" (1.6 m)    Wt 147 lb 12.8 oz (67 kg)    SpO2 99%    BMI 26.18 kg/m2      General appearance - alert, well appearing, and in no distress  Neck - supple, no significant adenopathy, carotids upstroke normal bilaterally, no bruits  Chest - clear to auscultation, no wheezes, rales or rhonchi, symmetric air entry  Heart - normal rate, regular rhythm, normal S1, S2, no murmurs, rubs, clicks or gallops  Extremities - peripheral pulses normal, no pedal edema, no clubbing or cyanosis      Assessment/Plan:      ICD-10-CM ICD-9-CM    1. Essential hypertension, benign I10 401.1    2. Pure hypercholesterolemia E78.00 272.0    3. Gastroesophageal reflux disease, esophagitis presence not specified K21.9 530.81    4. Vitamin D deficiency E55.9 268.9      reviewed diet, exercise and weight control  cardiovascular risk and specific lipid/LDL goals reviewed  use of aspirin to prevent MI and TIA's discussed  well controlled. current treatment plan is effective, no change in therapy. I have discussed the diagnosis with the patient and the intended plan as seen in the above orders. The patient has received an after-visit summary and questions were answered concerning future plans. I have discussed medication side effects and warnings with the patient as well. Patient agreeable with above plan and verbalizes understanding. Follow-up Disposition:  Return in about 5 weeks (around 10/26/2018) for medicare wellness.

## 2018-09-21 NOTE — MR AVS SNAPSHOT
[facility] 
 
 
 [address] [address] [address] [address] 
[phone] Patient: Noemy Centeno MRN: [id number] AGD:[id number] Visit Information Date & Time Provider Department Dept. Phone Encounter #  
 9/21/2018  8:40 AM Milena Reynolds NP [id number] Follow-up Instructions Return in about 5 weeks (around 10/26/2018) for medicare wellness. Upcoming Health Maintenance Date Due Pneumococcal 65+ Low/Medium Risk (2 of 2 - PPSV23) 4/26/2018 MEDICARE YEARLY EXAM 10/26/2018 GLAUCOMA SCREENING Q2Y 11/29/2019 BREAST CANCER SCRN MAMMOGRAM 7/23/2020 COLONOSCOPY 5/1/2025 DTaP/Tdap/Td series (2 - Td) 1/24/2028 Allergies as of 9/21/2018  Review Complete On: 9/21/2018 By: Milena Reynolds NP Severity Noted Reaction Type Reactions Naproxen  04/06/2010    Palpitations Current Immunizations  Reviewed on 6/26/2014 Name Date Influenza High Dose Vaccine PF 10/25/2017 Not reviewed this visit You Were Diagnosed With   
  
 Codes Comments Essential hypertension, benign    -  Primary ICD-10-CM: I10 
ICD-9-CM: 401.1 Pure hypercholesterolemia     ICD-10-CM: E78.00 ICD-9-CM: 272.0 Gastroesophageal reflux disease, esophagitis presence not specified     ICD-10-CM: K21.9 ICD-9-CM: 530.81 Vitamin D deficiency     ICD-10-CM: E55.9 ICD-9-CM: 268.9 Vitals BP Pulse Temp Resp Height(growth percentile) Weight(growth percentile) 130/76 (BP 1 Location: Right arm, BP Patient Position: Sitting) 63 97.6 °F (36.4 °C) (Oral) 20 5' 3\" (1.6 m) 147 lb 12.8 oz (67 kg) SpO2 BMI OB Status Smoking Status 99% 26.18 kg/m2 Hysterectomy Former Smoker Vitals History BMI and BSA Data Body Mass Index Body Surface Area  
 26.18 kg/m 2 1.73 m 2 Preferred Pharmacy Pharmacy Name Phone  [address], [address] [phone] Your Updated Medication List  
  
   
This list is accurate as of 9/21/18  9:28 AM.  Always use your most recent med list.  
  
  
  
  
 aspirin delayed-release 81 mg tablet Take 81 mg by mouth every other day. Does not take daily FISH OIL 1,000 mg Cap Generic drug:  omega-3 fatty acids-vitamin e Take 1 Cap by mouth daily. hydroCHLOROthiazide 12.5 mg tablet Commonly known as:  HYDRODIURIL  
TAKE 1 TABLET BY MOUTH ONCE DAILY  
  
 metoprolol succinate 100 mg tablet Commonly known as:  TOPROL-XL  
TAKE 1 TABLET BY MOUTH ONCE DAILY PriLOSEC 20 mg capsule Generic drug:  omeprazole Take 20 mg by mouth daily. simvastatin 10 mg tablet Commonly known as:  ZOCOR Take 1 Tab by mouth nightly. VITAMIN D 2,000 unit Cap capsule Generic drug:  Cholecalciferol (Vitamin D3) Take  by mouth daily. Follow-up Instructions Return in about 5 weeks (around 10/26/2018) for medicare wellness. Patient Instructions Gastroesophageal Reflux Disease (GERD): Care Instructions Your Care Instructions Gastroesophageal reflux disease (GERD) is the backward flow of stomach acid into the esophagus. The esophagus is the tube that leads from your throat to your stomach. A one-way valve prevents the stomach acid from moving up into this tube. When you have GERD, this valve does not close tightly enough. If you have mild GERD symptoms including heartburn, you may be able to control the problem with antacids or over-the-counter medicine. Changing your diet, losing weight, and making other lifestyle changes can also help reduce symptoms. Follow-up care is a key part of your treatment and safety. Be sure to make and go to all appointments, and call your doctor if you are having problems. It's also a good idea to know your test results and keep a list of the medicines you take. How can you care for yourself at home? · Take your medicines exactly as prescribed. Call your doctor if you think you are having a problem with your medicine. · Your doctor may recommend over-the-counter medicine. For mild or occasional indigestion, antacids, such as Tums, Gaviscon, Mylanta, or Maalox, may help. Your doctor also may recommend over-the-counter acid reducers, such as Pepcid AC, Tagamet HB, Zantac 75, or Prilosec. Read and follow all instructions on the label. If you use these medicines often, talk with your doctor. · Change your eating habits. ¨ It's best to eat several small meals instead of two or three large meals. ¨ After you eat, wait 2 to 3 hours before you lie down. ¨ Chocolate, mint, and alcohol can make GERD worse. ¨ Spicy foods, foods that have a lot of acid (like tomatoes and oranges), and coffee can make GERD symptoms worse in some people. If your symptoms are worse after you eat a certain food, you may want to stop eating that food to see if your symptoms get better. · Do not smoke or chew tobacco. Smoking can make GERD worse. If you need help quitting, talk to your doctor about stop-smoking programs and medicines. These can increase your chances of quitting for good. · If you have GERD symptoms at night, raise the head of your bed 6 to 8 inches by putting the frame on blocks or placing a foam wedge under the head of your mattress. (Adding extra pillows does not work.) · Do not wear tight clothing around your middle. · Lose weight if you need to. Losing just 5 to 10 pounds can help. When should you call for help? Call your doctor now or seek immediate medical care if: 
  · You have new or different belly pain.  
  · Your stools are black and tarlike or have streaks of blood.  
 Watch closely for changes in your health, and be sure to contact your doctor if: 
  · Your symptoms have not improved after 2 days.  
  · Food seems to catch in your throat or chest.  
Where can you learn more? Go to http://rad-batool.info/. Enter J508 in the search box to learn more about \"Gastroesophageal Reflux Disease (GERD): Care Instructions. \" Current as of: May 12, 2017 Content Version: 11.7 © 4157-7436 [employer]. Care instructions adapted under license by [employer] (which disclaims liability or warranty for this information). If you have questions about a medical condition or this instruction, always ask your healthcare professional. [address] any warranty or liability for your use of this information. Introducing [facility]! Dear Larry Jang: Thank you for requesting a [employer] account. Our records indicate that you already have an active [employer] account. You can access your account anytime at [email or web address]. [facility]/[employer] Did you know that you can access your hospital and ER discharge instructions at any time in [employer]? You can also review all of your test results from your hospital stay or ER visit. Additional Information If you have questions, please visit the Frequently Asked Questions section of the [employer] website at [email or web address] [facility]/[employer]/. Remember, [employer] is NOT to be used for urgent needs. For medical emergencies, dial 911. Now available from your iPhone and Android! Please provide this summary of care documentation to your next provider. Your primary care clinician is listed as Isatu Espinoza. If you have any questions after today's visit, please call [phone].

## 2018-10-22 ENCOUNTER — OFFICE VISIT (OUTPATIENT)
Dept: FAMILY MEDICINE CLINIC | Age: 73
End: 2018-10-22

## 2018-10-22 VITALS
DIASTOLIC BLOOD PRESSURE: 96 MMHG | HEIGHT: 63 IN | HEART RATE: 64 BPM | RESPIRATION RATE: 16 BRPM | TEMPERATURE: 98 F | WEIGHT: 151 LBS | BODY MASS INDEX: 26.75 KG/M2 | OXYGEN SATURATION: 100 % | SYSTOLIC BLOOD PRESSURE: 174 MMHG

## 2018-10-22 DIAGNOSIS — E78.00 PURE HYPERCHOLESTEROLEMIA: ICD-10-CM

## 2018-10-22 DIAGNOSIS — E66.3 OVERWEIGHT (BMI 25.0-29.9): ICD-10-CM

## 2018-10-22 DIAGNOSIS — E55.9 VITAMIN D DEFICIENCY: ICD-10-CM

## 2018-10-22 DIAGNOSIS — Z23 ENCOUNTER FOR IMMUNIZATION: ICD-10-CM

## 2018-10-22 DIAGNOSIS — Z00.00 MEDICARE ANNUAL WELLNESS VISIT, SUBSEQUENT: Primary | ICD-10-CM

## 2018-10-22 DIAGNOSIS — I10 ESSENTIAL HYPERTENSION, BENIGN: ICD-10-CM

## 2018-10-22 DIAGNOSIS — K21.9 GASTROESOPHAGEAL REFLUX DISEASE WITHOUT ESOPHAGITIS: ICD-10-CM
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
## 2018-10-22 NOTE — PATIENT INSTRUCTIONS
Medicare Wellness Visit, Female The best way to live healthy is to have a lifestyle where you eat a well-balanced diet, exercise regularly, limit alcohol use, and quit all forms of tobacco/nicotine, if applicable. Regular preventive services are another way to keep healthy. Preventive services (vaccines, screening tests, monitoring & exams) can help personalize your care plan, which helps you manage your own care. Screening tests can find health problems at the earliest stages, when they are easiest to treat. [facility] follows the current, evidence-based guidelines published by the [facility] ([facility]) when recommending preventive services for our patients. Because we follow these guidelines, sometimes recommendations change over time as research supports it. (For example, mammograms used to be recommended annually. Even though Medicare will still pay for an annual mammogram, the newer guidelines recommend a mammogram every two years for women of average risk.) Of course, you and your doctor may decide to screen more often for some diseases, based on your risk and your health status. Preventive services for you include: - Medicare offers their members a free annual wellness visit, which is time for you and your primary care provider to discuss and plan for your preventive service needs. Take advantage of this benefit every year! 
-All adults over the age of 72 should receive the recommended pneumonia vaccines. Current USPSTF guidelines recommend a series of two vaccines for the best pneumonia protection.  
-All adults should have a flu vaccine yearly and a tetanus vaccine every 10 years. All adults age 61 and older should receive a shingles vaccine once in their lifetime.   
-A bone mass density test is recommended when a woman turns 65 to screen for osteoporosis. This test is only recommended one time, as a screening. Some providers will use this same test as a disease monitoring tool if you already have osteoporosis. -All adults age 38-68 who are overweight should have a diabetes screening test once every three years.  
-Other screening tests and preventive services for persons with diabetes include: an eye exam to screen for diabetic retinopathy, a kidney function test, a foot exam, and stricter control over your cholesterol.  
-Cardiovascular screening for adults with routine risk involves an electrocardiogram (ECG) at intervals determined by your doctor.  
-Colorectal cancer screenings should be done for adults age 54-65 with no increased risk factors for colorectal cancer. There are a number of acceptable methods of screening for this type of cancer. Each test has its own benefits and drawbacks. Discuss with your doctor what is most appropriate for you during your annual wellness visit. The different tests include: colonoscopy (considered the best screening method), a fecal occult blood test, a fecal DNA test, and sigmoidoscopy. -Breast cancer screenings are recommended every other year for women of normal risk, age 54-69. 
-Cervical cancer screenings for women over age 72 are only recommended with certain risk factors.  
-All adults born between [facility] should be screened once for Hepatitis C. Here is a list of your current Health Maintenance items (your personalized list of preventive services) with a due date: 
Health Maintenance Due Topic Date Due  Shingles Vaccine (1 of 2) 09/12/1997  Pneumococcal Vaccine (2 of 2 - PPSV23) 04/26/2018

## 2018-11-19 ENCOUNTER — OFFICE VISIT (OUTPATIENT)
Dept: FAMILY MEDICINE CLINIC | Age: 73
End: 2018-11-19

## 2018-11-19 VITALS
DIASTOLIC BLOOD PRESSURE: 84 MMHG | RESPIRATION RATE: 16 BRPM | HEIGHT: 63 IN | TEMPERATURE: 97.8 F | OXYGEN SATURATION: 100 % | BODY MASS INDEX: 26.68 KG/M2 | WEIGHT: 150.6 LBS | SYSTOLIC BLOOD PRESSURE: 130 MMHG | HEART RATE: 67 BPM

## 2018-11-19 DIAGNOSIS — I10 ESSENTIAL HYPERTENSION, BENIGN: Primary | ICD-10-CM

## 2018-11-19 DIAGNOSIS — E66.3 OVERWEIGHT (BMI 25.0-29.9): ICD-10-CM

## 2018-11-19 NOTE — PROGRESS NOTES
Pt is here for 4 week f/u on HTN 1. Have you been to the ER, urgent care clinic since your last visit? Hospitalized since your last visit? No 
 
2. Have you seen or consulted any other health care providers outside of the [address] [place] [address] since your last visit? Include any pap smears or colon screening.  No

## 2018-11-19 NOTE — PATIENT INSTRUCTIONS
High Blood Pressure: Care Instructions Your Care Instructions If your blood pressure is usually above 130/80, you have high blood pressure, or hypertension. That means the top number is 130 or higher or the bottom number is 80 or higher, or both. Despite what a lot of people think, high blood pressure usually doesn't cause headaches or make you feel dizzy or lightheaded. It usually has no symptoms. But it does increase your risk for heart attack, stroke, and kidney or eye damage. The higher your blood pressure, the more your risk increases. Your doctor will give you a goal for your blood pressure. Your goal will be based on your health and your age. Lifestyle changes, such as eating healthy and being active, are always important to help lower blood pressure. You might also take medicine to reach your blood pressure goal. 
Follow-up care is a key part of your treatment and safety. Be sure to make and go to all appointments, and call your doctor if you are having problems. It's also a good idea to know your test results and keep a list of the medicines you take. How can you care for yourself at home? Medical treatment · If you stop taking your medicine, your blood pressure will go back up. You may take one or more types of medicine to lower your blood pressure. Be safe with medicines. Take your medicine exactly as prescribed. Call your doctor if you think you are having a problem with your medicine. · Talk to your doctor before you start taking aspirin every day. Aspirin can help certain people lower their risk of a heart attack or stroke. But taking aspirin isn't right for everyone, because it can cause serious bleeding. · See your doctor regularly. You may need to see the doctor more often at first or until your blood pressure comes down. · If you are taking blood pressure medicine, talk to your doctor before you take decongestants or anti-inflammatory medicine, such as ibuprofen. Some of these medicines can raise blood pressure. · Learn how to check your blood pressure at home. Lifestyle changes · Stay at a healthy weight. This is especially important if you put on weight around the waist. Losing even 10 pounds can help you lower your blood pressure. · If your doctor recommends it, get more exercise. Walking is a good choice. Bit by bit, increase the amount you walk every day. Try for at least 30 minutes on most days of the week. You also may want to swim, bike, or do other activities. · Avoid or limit alcohol. Talk to your doctor about whether you can drink any alcohol. · Try to limit how much sodium you eat to less than 2,300 milligrams (mg) a day. Your doctor may ask you to try to eat less than 1,500 mg a day. · Eat plenty of fruits (such as bananas and oranges), vegetables, legumes, whole grains, and low-fat dairy products. · Lower the amount of saturated fat in your diet. Saturated fat is found in animal products such as milk, cheese, and meat. Limiting these foods may help you lose weight and also lower your risk for heart disease. · Do not smoke. Smoking increases your risk for heart attack and stroke. If you need help quitting, talk to your doctor about stop-smoking programs and medicines. These can increase your chances of quitting for good. When should you call for help? Call 911 anytime you think you may need emergency care. This may mean having symptoms that suggest that your blood pressure is causing a serious heart or blood vessel problem. Your blood pressure may be over 180/120. 
 For example, call 911 if: 
  · You have symptoms of a heart attack. These may include: 
? Chest pain or pressure, or a strange feeling in the chest. 
? Sweating. ? Shortness of breath. ? Nausea or vomiting. ? Pain, pressure, or a strange feeling in the back, neck, jaw, or upper belly or in one or both shoulders or arms. ? Lightheadedness or sudden weakness. ? A fast or irregular heartbeat.  
  · You have symptoms of a stroke. These may include: 
? Sudden numbness, tingling, weakness, or loss of movement in your face, arm, or leg, especially on only one side of your body. ? Sudden vision changes. ? Sudden trouble speaking. ? Sudden confusion or trouble understanding simple statements. ? Sudden problems with walking or balance. ? A sudden, severe headache that is different from past headaches.  
  · You have severe back or belly pain.  
 Do not wait until your blood pressure comes down on its own. Get help right away. 
 Call your doctor now or seek immediate care if: 
  · Your blood pressure is much higher than normal (such as 180/120 or higher), but you don't have symptoms.  
  · You think high blood pressure is causing symptoms, such as: 
? Severe headache. 
? Blurry vision.  
 Watch closely for changes in your health, and be sure to contact your doctor if: 
  · Your blood pressure measures higher than your doctor recommends at least 2 times. That means the top number is higher or the bottom number is higher, or both.  
  · You think you may be having side effects from your blood pressure medicine. Where can you learn more? Go to http://rad-batool.info/. Enter W150 in the search box to learn more about \"High Blood Pressure: Care Instructions. \" Current as of: December 6, 2017 Content Version: 11.8 © 9979-7312 [employer]. Care instructions adapted under license by [employer] (which disclaims liability or warranty for this information). If you have questions about a medical condition or this instruction, always ask your healthcare professional. [address] [address] any warranty or liability for your use of this information.

## 2018-11-19 NOTE — PROGRESS NOTES
Subjective:  
Lalitha Sommers is a 72 y.o. female with hypertension presents for 4 weeks follow up. Patient states she has been feeling better with regards to personal stressors with her . Patient Active Problem List  
Diagnosis Code  Essential hypertension, benign I10  Pure hypercholesterolemia E78.00  GERD (gastroesophageal reflux disease) K21.9  Vitamin D deficiency E55.9  PND (paroxysmal nocturnal dyspnea) R06.00  
 Pain in heel M79.673  Situational stress F43.9  Influenza vaccination declined Z28.21  
 23-polyvalent pneumococcal polysaccharide vaccine declined Z28.21  
 Overweight (BMI 25.0-29. 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  
 simvastatin (ZOCOR) 10 mg tablet Take 1 Tab by mouth nightly. 90 Tab 1  
 hydroCHLOROthiazide (HYDRODIURIL) 12.5 mg tablet TAKE 1 TABLET BY MOUTH ONCE DAILY 90 Tab 1  
 omeprazole (PRILOSEC) 20 mg capsule Take 20 mg by mouth daily.  aspirin delayed-release 81 mg tablet Take 81 mg by mouth every other day. Does not take daily  omega-3 fatty acids-vitamin e (FISH OIL) 1,000 mg Cap Take 1 Cap by mouth daily.  Cholecalciferol, Vitamin D3, (VITAMIN D) 2,000 unit Cap Take  by mouth daily. Allergies Allergen Reactions  Naproxen Palpitations Past Surgical History:  
Procedure Laterality Date  HX HYSTERECTOMY  1960's  [place] COLONOSCOPY FLX DX W/COLLJ SPEC WHEN PFRMD  2004  
 normal ; Dr. Everett Austin Family History Problem Relation Age of Onset  Hypertension Mother [employer] Arthritis-osteo Mother  Heart Attack Father  Hypertension Sister  High Cholesterol Sister  Breast Cancer Sister  Hypertension Brother Lab Results Component Value Date/Time Cholesterol, total 202 (H) 09/05/2018 08:10 AM  
 HDL Cholesterol 60 09/05/2018 08:10 AM  
 LDL, calculated 131 (H) 09/05/2018 08:10 AM  
 VLDL, calculated 11 09/05/2018 08:10 AM  
 Triglyceride 57 09/05/2018 08:10 AM  
 CHOL/HDL Ratio 3.3 04/25/2018 07:42 AM  
 
Lab Results Component Value Date/Time Sodium 140 09/05/2018 08:10 AM  
 Potassium 4.1 09/05/2018 08:10 AM  
 Chloride 102 09/05/2018 08:10 AM  
 CO2 27 09/05/2018 08:10 AM  
 Anion gap 8 10/18/2017 08:17 AM  
 Glucose 79 09/05/2018 08:10 AM  
 BUN 14 09/05/2018 08:10 AM  
 Creatinine 0.93 09/05/2018 08:10 AM  
 BUN/Creatinine ratio 15 09/05/2018 08:10 AM  
 GFR est AA 72 09/05/2018 08:10 AM  
 GFR est non-AA 62 09/05/2018 08:10 AM  
 Calcium 9.5 09/05/2018 08:10 AM  
 Bilirubin, total 0.3 09/05/2018 08:10 AM  
 AST (SGOT) 14 09/05/2018 08:10 AM  
 Alk. phosphatase 48 09/05/2018 08:10 AM  
 Protein, total 6.9 09/05/2018 08:10 AM  
 Albumin 3.9 09/05/2018 08:10 AM  
 Globulin 3.4 10/18/2017 08:17 AM  
 A-G Ratio 1.3 09/05/2018 08:10 AM  
 ALT (SGPT) 8 09/05/2018 08:10 AM  
 
Lab Results Component Value Date/Time WBC 6.0 01/17/2018 08:24 AM  
 HGB 13.0 01/17/2018 08:24 AM  
 HCT 41.4 01/17/2018 08:24 AM  
 PLATELET 164 32/26/1735 08:24 AM  
 MCV 83 01/17/2018 08:24 AM  
 
Wt Readings from Last 3 Encounters:  
11/19/18 150 lb 9.6 oz (68.3 kg) 10/22/18 151 lb (68.5 kg) 09/21/18 147 lb 12.8 oz (67 kg) Last Point of Care HGB A1C No results found for: [id number]  
BP Readings from Last 3 Encounters:  
11/19/18 130/84  
10/22/18 (!) 174/96  
09/21/18 130/76 Hypertension ROS: taking medications as instructed, no medication side effects noted, no TIA's, no chest pain on exertion, no dyspnea on exertion, no swelling of ankles. New concerns: none. Objective:  
Visit Vitals /84 (BP 1 Location: Left arm) Pulse 67 Temp 97.8 °F (36.6 °C) (Oral) Resp 16 Ht 5' 3\" (1.6 m) Wt 150 lb 9.6 oz (68.3 kg) SpO2 100% BMI 26.68 kg/m² General appearance - alert, well appearing, and in no distress Neck - supple, no significant adenopathy, carotids upstroke normal bilaterally, no bruits Chest - clear to auscultation, no wheezes, rales or rhonchi, symmetric air entry Heart - normal rate, regular rhythm, normal S1, S2, no murmurs, rubs, clicks or gallops Extremities - peripheral pulses normal, no pedal edema, no clubbing or cyanosis Assessment/Plan: ICD-10-CM ICD-9-CM 1. Essential hypertension, benign I10 401.1 2. Overweight (BMI 25.0-29. 9) E66.3 278.02   
 
improved. reviewed diet, exercise and weight control. I have discussed the diagnosis with the patient and the intended plan as seen in the above orders. The patient has received an after-visit summary and questions were answered concerning future plans. I have discussed medication side effects and warnings with the patient as well. Patient agreeable with above plan and verbalizes understanding. Follow-up Disposition: 
Return in about 3 months (around 2/19/2019) for HTN.

## 2018-12-10 RX ORDER — SIMVASTATIN 10 MG/1
TABLET, FILM COATED ORAL
Qty: 90 TAB | Refills: 1 | Status: SHIPPED | OUTPATIENT
Start: 2018-12-10 | End: 2019-05-16 | Stop reason: SDUPTHER

## 2018-12-10 RX ORDER — METOPROLOL SUCCINATE 100 MG/1
TABLET, EXTENDED RELEASE ORAL
Qty: 90 TAB | Refills: 1 | Status: SHIPPED | OUTPATIENT
Start: 2018-12-10 | End: 2019-05-16 | Stop reason: SDUPTHER

## 2019-02-11 RX ORDER — HYDROCHLOROTHIAZIDE 12.5 MG/1
TABLET ORAL
Qty: 90 TAB | Refills: 1 | Status: SHIPPED | OUTPATIENT
Start: 2019-02-11 | End: 2019-07-02 | Stop reason: SDUPTHER

## 2019-03-01 ENCOUNTER — OFFICE VISIT (OUTPATIENT)
Dept: FAMILY MEDICINE CLINIC | Age: 74
End: 2019-03-01

## 2019-03-01 VITALS
DIASTOLIC BLOOD PRESSURE: 80 MMHG | HEART RATE: 74 BPM | WEIGHT: 154.8 LBS | TEMPERATURE: 97.7 F | SYSTOLIC BLOOD PRESSURE: 128 MMHG | OXYGEN SATURATION: 100 % | HEIGHT: 63 IN | RESPIRATION RATE: 16 BRPM | BODY MASS INDEX: 27.43 KG/M2

## 2019-03-01 DIAGNOSIS — Z79.899 ENCOUNTER FOR MONITORING LONG-TERM PROTON PUMP INHIBITOR THERAPY: ICD-10-CM

## 2019-03-01 DIAGNOSIS — E78.00 PURE HYPERCHOLESTEROLEMIA: ICD-10-CM

## 2019-03-01 DIAGNOSIS — K21.9 GASTROESOPHAGEAL REFLUX DISEASE, ESOPHAGITIS PRESENCE NOT SPECIFIED: ICD-10-CM

## 2019-03-01 DIAGNOSIS — E55.9 VITAMIN D DEFICIENCY: ICD-10-CM

## 2019-03-01 DIAGNOSIS — I10 ESSENTIAL HYPERTENSION, BENIGN: Primary | ICD-10-CM

## 2019-03-01 DIAGNOSIS — Z51.81 ENCOUNTER FOR MONITORING LONG-TERM PROTON PUMP INHIBITOR THERAPY: ICD-10-CM

## 2019-03-01 NOTE — PROGRESS NOTES
Pt is here for 3 month F/U HTN    1. Have you been to the ER, urgent care clinic since your last visit? Hospitalized since your last visit? No    2. Have you seen or consulted any other health care providers outside of the [address] [place] [address] since your last visit? Include any pap smears or colon screening.  No        Pt declined pneumonia vaccine

## 2019-03-01 NOTE — PROGRESS NOTES
Subjective:   Catrina Turner is a 72 y.o. female with hypertension presents for 3 months follow up. Patient Active Problem List   Diagnosis Code    Essential hypertension, benign I10    Pure hypercholesterolemia E78.00    GERD (gastroesophageal reflux disease) K21.9    Vitamin D deficiency E55.9    PND (paroxysmal nocturnal dyspnea) R06.00    Pain in heel M79.673    Situational stress F43.9    Influenza vaccination declined Z28.21    23-polyvalent pneumococcal polysaccharide vaccine declined Z28.21    Overweight (BMI 25.0-29. 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  aspirin delayed-release 81 mg tablet Take 81 mg by mouth every other day. Does not take daily      omega-3 fatty acids-vitamin e (FISH OIL) 1,000 mg Cap Take 1 Cap by mouth daily.  Cholecalciferol, Vitamin D3, (VITAMIN D) 2,000 unit Cap Take  by mouth daily.         Allergies   Allergen Reactions    Naproxen Palpitations     Past Surgical History:   Procedure Laterality Date    HX HYSTERECTOMY  1960's    [place] COLONOSCOPY FLX DX W/COLLJ SPEC WHEN PFRMD  2004    normal ; Dr. Lakhwinder Lyman     Family History   Problem Relation Age of Onset    Hypertension Mother     Arthritis-osteo Mother     Heart Attack Father     Hypertension Sister     High Cholesterol Sister     Breast Cancer Sister     Hypertension Brother       Lab Results   Component Value Date/Time    Cholesterol, total 202 (H) 09/05/2018 08:10 AM    HDL Cholesterol 60 09/05/2018 08:10 AM    LDL, calculated 131 (H) 09/05/2018 08:10 AM    VLDL, calculated 11 09/05/2018 08:10 AM    Triglyceride 57 09/05/2018 08:10 AM    CHOL/HDL Ratio 3.3 04/25/2018 07:42 AM     Lab Results   Component Value Date/Time    Sodium 140 09/05/2018 08:10 AM    Potassium 4.1 09/05/2018 08:10 AM    Chloride 102 09/05/2018 08:10 AM    CO2 27 09/05/2018 08:10 AM    Anion gap 8 10/18/2017 08:17 AM    Glucose 79 09/05/2018 08:10 AM    BUN 14 09/05/2018 08:10 AM    Creatinine 0.93 09/05/2018 08:10 AM    BUN/Creatinine ratio 15 09/05/2018 08:10 AM    GFR est AA 72 09/05/2018 08:10 AM    GFR est non-AA 62 09/05/2018 08:10 AM    Calcium 9.5 09/05/2018 08:10 AM    Bilirubin, total 0.3 09/05/2018 08:10 AM    AST (SGOT) 14 09/05/2018 08:10 AM    Alk. phosphatase 48 09/05/2018 08:10 AM    Protein, total 6.9 09/05/2018 08:10 AM    Albumin 3.9 09/05/2018 08:10 AM    Globulin 3.4 10/18/2017 08:17 AM    A-G Ratio 1.3 09/05/2018 08:10 AM    ALT (SGPT) 8 09/05/2018 08:10 AM     Lab Results   Component Value Date/Time    WBC 6.0 01/17/2018 08:24 AM    HGB 13.0 01/17/2018 08:24 AM    HCT 41.4 01/17/2018 08:24 AM    PLATELET 382 78/17/6484 08:24 AM    MCV 83 01/17/2018 08:24 AM     Wt Readings from Last 3 Encounters:   03/01/19 154 lb 12.8 oz (70.2 kg)   11/19/18 150 lb 9.6 oz (68.3 kg)   10/22/18 151 lb (68.5 kg)     Last Point of Care HGB A1C  No results found for: [id number]   BP Readings from Last 3 Encounters:   03/01/19 151/80   11/19/18 130/84   10/22/18 (!) 174/96       Hypertension ROS: taking medications as instructed, no medication side effects noted, no TIA's, no chest pain on exertion, no dyspnea on exertion, no swelling of ankles. New concerns: none.      Objective:   Visit Vitals  /80 (BP 1 Location: Left arm)   Pulse 74   Temp 97.7 °F (36.5 °C) (Oral)   Resp 16   Ht 5' 3\" (1.6 m)   Wt 154 lb 12.8 oz (70.2 kg)   SpO2 100%   BMI 27.42 kg/m²      General appearance - alert, well appearing, and in no distress  Neck - supple, no significant adenopathy, carotids upstroke normal bilaterally, no bruits  Chest - clear to auscultation, no wheezes, rales or rhonchi, symmetric air entry  Heart - normal rate, regular rhythm, normal S1, S2, no murmurs, rubs, clicks or gallops  Extremities - peripheral pulses normal, no pedal edema, no clubbing or cyanosis  Abdomen - soft, nontender, nondistended, no masses or organomegaly        Assessment/Plan:      ICD-10-CM ICD-9-CM    1. Essential hypertension, benign I10 401.1 LIPID PANEL      METABOLIC PANEL, COMPREHENSIVE   2. Pure hypercholesterolemia E78.00 272.0 LIPID PANEL      METABOLIC PANEL, COMPREHENSIVE   3. Vitamin D deficiency E55.9 268.9 VITAMIN D, 25 HYDROXY   4. Gastroesophageal reflux disease, esophagitis presence not specified K21.9 530.81 MAGNESIUM   5. Encounter for monitoring long-term proton pump inhibitor therapy Z51.81 V58.83 MAGNESIUM    Z79.899 V58.69      Above stable. current treatment plan is effective, no change in therapy. I have discussed the diagnosis with the patient and the intended plan as seen in the above orders. The patient has received an after-visit summary and questions were answered concerning future plans. I have discussed medication side effects and warnings with the patient as well. Patient agreeable with above plan and verbalizes understanding. Follow-up Disposition:  Return in about 4 months (around 7/1/2019) for HTN/HLD.

## 2019-03-01 NOTE — PATIENT INSTRUCTIONS
Learning About Dietary Guidelines  What are the Dietary Guidelines for Americans? Dietary Guidelines for Americans provide tips for eating well and staying healthy. This helps reduce the risk for long-term (chronic) diseases. These adult guidelines from the [place] recommend that you:  · Eat lots of fruits, vegetables, whole grains, and low-fat or nonfat dairy products. · Try to balance your eating with your activity. This helps you stay at a healthy weight. · Drink alcohol in moderation, if at all. · Limit foods high in salt, saturated fat, trans fat, and added sugar. What is MyPlate? MyPlate is the U.S. government's food guide. It can help you make your own well-balanced eating plan. A balanced eating plan means that you eat enough, but not too much, and that your food gives you the nutrients you need to stay healthy. MyPlate focuses on eating plenty of whole grains, fruits, and vegetables, and on limiting fat and sugar. It is available online at www. ChooseMyPlate.gov. How can you get started? MyPlate suggests that most adults eat certain amounts from the different food groups:  Grains  Eat 5 to 8 ounces of grains each day. Half of those should be whole grains. Choose whole-grain breads, cold and cooked cereals and grains, pasta (without creamy sauces), hard rolls, or low-fat or fat-free crackers. Vegetables  Eat 2 to 3 cups of vegetables every day. They contain little if any fat. And they have lots of nutrients that help protect against heart disease. Fruits  Eat 1½ to 2 cups of fruits every day. Fruits contain very little fat but lots of nutrients. Protein foods  Most adults need 5 to 6½ ounces each day. Choose fish and lean poultry more often. Eat red meat and fried meats less often. Dried beans, tofu, and nuts are also good sources of protein. Dairy  Most adults need 3 cups of milk and milk products a day. Choose low-fat or fat-free products from this food group.  If you have problems digesting milk, try eating cheese or yogurt instead. Limit fats and oils, including those used in cooking. When you do use fats, choose oils that are liquid at room temperature (unsaturated fats). These include canola oil and olive oil. Avoid foods with trans fats, such as many fried foods, cookies, and snack foods. Where can you learn more? Go to http://rad-batool.info/. Enter M014 in the search box to learn more about \"Learning About Dietary Guidelines. \"  Current as of: March 28, 2018  Content Version: 11.9  © 7373-1884 [employer]. Care instructions adapted under license by [employer] (which disclaims liability or warranty for this information). If you have questions about a medical condition or this instruction, always ask your healthcare professional. [address] any warranty or liability for your use of this information.

## 2019-03-02 LAB
25(OH)D3+25(OH)D2 SERPL-MCNC: 57.9 NG/ML (ref 30–100)
ALBUMIN SERPL-MCNC: 4 G/DL (ref 3.5–4.8)
ALBUMIN/GLOB SERPL: 1.3 {RATIO} (ref 1.2–2.2)
ALP SERPL-CCNC: 47 IU/L (ref 39–117)
ALT SERPL-CCNC: 11 IU/L (ref 0–32)
AST SERPL-CCNC: 15 IU/L (ref 0–40)
BILIRUB SERPL-MCNC: 0.3 MG/DL (ref 0–1.2)
BUN SERPL-MCNC: 13 MG/DL (ref 8–27)
BUN/CREAT SERPL: 14 (ref 12–28)
CALCIUM SERPL-MCNC: 9.6 MG/DL (ref 8.7–10.3)
CHLORIDE SERPL-SCNC: 101 MMOL/L (ref 96–106)
CHOLEST SERPL-MCNC: 187 MG/DL (ref 100–199)
CO2 SERPL-SCNC: 26 MMOL/L (ref 20–29)
CREAT SERPL-MCNC: 0.91 MG/DL (ref 0.57–1)
GLOBULIN SER CALC-MCNC: 3 G/DL (ref 1.5–4.5)
GLUCOSE SERPL-MCNC: 78 MG/DL (ref 65–99)
HDLC SERPL-MCNC: 61 MG/DL
LDLC SERPL CALC-MCNC: 116 MG/DL (ref 0–99)
MAGNESIUM SERPL-MCNC: 2.2 MG/DL (ref 1.6–2.3)
POTASSIUM SERPL-SCNC: 4.2 MMOL/L (ref 3.5–5.2)
PROT SERPL-MCNC: 7 G/DL (ref 6–8.5)
SODIUM SERPL-SCNC: 142 MMOL/L (ref 134–144)
TRIGL SERPL-MCNC: 51 MG/DL (ref 0–149)
VLDLC SERPL CALC-MCNC: 10 MG/DL (ref 5–40)

## 2019-05-16 RX ORDER — METOPROLOL SUCCINATE 100 MG/1
TABLET, EXTENDED RELEASE ORAL
Qty: 90 TAB | Refills: 1 | Status: SHIPPED | OUTPATIENT
Start: 2019-05-16 | End: 2019-12-02 | Stop reason: SDUPTHER

## 2019-05-16 RX ORDER — SIMVASTATIN 10 MG/1
TABLET, FILM COATED ORAL
Qty: 90 TAB | Refills: 1 | Status: SHIPPED | OUTPATIENT
Start: 2019-05-16 | End: 2020-09-01 | Stop reason: SDUPTHER

## 2019-07-03 ENCOUNTER — OFFICE VISIT (OUTPATIENT)
Dept: FAMILY MEDICINE CLINIC | Age: 74
End: 2019-07-03

## 2019-07-03 VITALS
WEIGHT: 157 LBS | HEIGHT: 63 IN | DIASTOLIC BLOOD PRESSURE: 87 MMHG | RESPIRATION RATE: 16 BRPM | TEMPERATURE: 97.7 F | HEART RATE: 82 BPM | SYSTOLIC BLOOD PRESSURE: 134 MMHG | BODY MASS INDEX: 27.82 KG/M2 | OXYGEN SATURATION: 97 %

## 2019-07-03 DIAGNOSIS — E78.00 PURE HYPERCHOLESTEROLEMIA: Primary | ICD-10-CM

## 2019-07-03 DIAGNOSIS — I10 ESSENTIAL HYPERTENSION, BENIGN: ICD-10-CM

## 2019-07-03 DIAGNOSIS — K21.9 GASTROESOPHAGEAL REFLUX DISEASE, ESOPHAGITIS PRESENCE NOT SPECIFIED: ICD-10-CM

## 2019-07-03 DIAGNOSIS — S00.81XA ABRASION OF FOREHEAD, INITIAL ENCOUNTER: ICD-10-CM

## 2019-07-03 RX ORDER — OMEPRAZOLE 20 MG/1
20 CAPSULE, DELAYED RELEASE ORAL 2 TIMES DAILY
Qty: 60 CAP | Refills: 0
Start: 2019-07-03 | End: 2022-03-02 | Stop reason: ALTCHOICE

## 2019-07-03 RX ORDER — HYDROCHLOROTHIAZIDE 12.5 MG/1
TABLET ORAL
Qty: 90 TAB | Refills: 1 | Status: SHIPPED | OUTPATIENT
Start: 2019-07-03 | End: 2019-11-18 | Stop reason: SDUPTHER

## 2019-07-03 NOTE — PATIENT INSTRUCTIONS
Scrapes (Abrasions): Care Instructions  Your Care Instructions  Scrapes (abrasions) are wounds where your skin has been rubbed or torn off. Most scrapes do not go deep into the skin, but some may remove several layers of skin. Scrapes usually don't bleed much, but they may ooze pinkish fluid. Scrapes on the head or face may appear worse than they are. They may bleed a lot because of the good blood supply to this area. Most scrapes heal well and may not need a bandage. They usually heal within 3 to 7 days. A large, deep scrape may take 1 to 2 weeks or longer to heal. A scab may form on some scrapes. Follow-up care is a key part of your treatment and safety. Be sure to make and go to all appointments, and call your doctor if you are having problems. It's also a good idea to know your test results and keep a list of the medicines you take. How can you care for yourself at home? · If your doctor told you how to care for your wound, follow your doctor's instructions. If you did not get instructions, follow this general advice:  ? Wash the scrape with clean water 2 times a day. Don't use hydrogen peroxide or alcohol, which can slow healing. ? You may cover the scrape with a thin layer of petroleum jelly, such as Vaseline, and a nonstick bandage. ? Apply more petroleum jelly and replace the bandage as needed. · Prop up the injured area on a pillow anytime you sit or lie down during the next 3 days. Try to keep it above the level of your heart. This will help reduce swelling. · Be safe with medicines. Take pain medicines exactly as directed. ? If the doctor gave you a prescription medicine for pain, take it as prescribed. ? If you are not taking a prescription pain medicine, ask your doctor if you can take an over-the-counter medicine. When should you call for help?   Call your doctor now or seek immediate medical care if:    · You have signs of infection, such as:  ? Increased pain, swelling, warmth, or redness around the scrape. ? Red streaks leading from the scrape. ? Pus draining from the scrape. ? A fever.     · The scrape starts to bleed, and blood soaks through the bandage. Oozing small amounts of blood is normal.    Watch closely for changes in your health, and be sure to contact your doctor if the scrape is not getting better each day. Where can you learn more? Go to http://rad-batool.info/. Enter A374 in the search box to learn more about \"Scrapes (Abrasions): Care Instructions. \"  Current as of: September 23, 2018  Content Version: 11.9  © 5599-1054 [employer]. Care instructions adapted under license by [employer] (which disclaims liability or warranty for this information). If you have questions about a medical condition or this instruction, always ask your healthcare professional. [place] [address] any warranty or liability for your use of this information.

## 2019-07-03 NOTE — PROGRESS NOTES
-- Message is from the [facility]--    Reason for Call: Patient called to office stated that he has decided to leave [facility] and is requesting for his medical records to be sent to [facility] in [address] Dr. Xiang Lin phone number [phone]. Fax#[phone].        Alternative phone number: [phone]    Turnaround time given to caller:   \"This message will be sent to [state Provider's name]. The clinical team will fulfill your request as soon as they review your message when the office opens on Monday (or after the holiday).\"     Pt is here for follow up HTN, cholesterol  Pt states she hit her forehead on a tree [place] approimately 1 week ago. C/O soreness to L side. Having some issues with reflux. 1. Have you been to the ER, urgent care clinic since your last visit? Hospitalized since your last visit? No    2. Have you seen or consulted any other health care providers outside of the [address] [place] [address] since your last visit? Include any pap smears or colon screening.  No

## 2019-07-03 NOTE — PROGRESS NOTES
Subjective:   Rinku Neal is a 72 y.o. female with hypertension presents for 3 months follow up. Patient Active Problem List   Diagnosis Code    Essential hypertension, benign I10    Pure hypercholesterolemia E78.00    GERD (gastroesophageal reflux disease) K21.9    Vitamin D deficiency E55.9    PND (paroxysmal nocturnal dyspnea) R06.00    Pain in heel M79.673    Situational stress F43.9    Influenza vaccination declined Z28.21    23-polyvalent pneumococcal polysaccharide vaccine declined Z28.21    Overweight (BMI 25.0-29. 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  aspirin delayed-release 81 mg tablet Take 81 mg by mouth every other day. Does not take daily      omega-3 fatty acids-vitamin e (FISH OIL) 1,000 mg Cap Take 1 Cap by mouth daily.  Cholecalciferol, Vitamin D3, (VITAMIN D) 2,000 unit Cap Take  by mouth daily.         Allergies   Allergen Reactions    Naproxen Palpitations     Past Surgical History:   Procedure Laterality Date    HX HYSTERECTOMY  1960's    [place] COLONOSCOPY FLX DX W/COLLJ SPEC WHEN PFRMD  2004    normal ; Dr. Cristina Villanueva     Family History   Problem Relation Age of Onset    Hypertension Mother     Arthritis-osteo Mother     Heart Attack Father     Hypertension Sister     High Cholesterol Sister     Breast Cancer Sister     Hypertension Brother       Lab Results   Component Value Date/Time    Cholesterol, total 187 03/01/2019 10:59 AM    HDL Cholesterol 61 03/01/2019 10:59 AM    LDL, calculated 116 (H) 03/01/2019 10:59 AM    VLDL, calculated 10 03/01/2019 10:59 AM    Triglyceride 51 03/01/2019 10:59 AM    CHOL/HDL Ratio 3.3 04/25/2018 07:42 AM     Lab Results   Component Value Date/Time    Sodium 142 03/01/2019 10:59 AM    Potassium 4.2 03/01/2019 10:59 AM    Chloride 101 03/01/2019 10:59 AM    CO2 26 03/01/2019 10:59 AM    Anion gap 8 10/18/2017 08:17 AM    Glucose 78 03/01/2019 10:59 AM    BUN 13 03/01/2019 10:59 AM    Creatinine 0.91 03/01/2019 10:59 AM    BUN/Creatinine ratio 14 03/01/2019 10:59 AM    GFR est AA 73 03/01/2019 10:59 AM    GFR est non-AA 64 03/01/2019 10:59 AM    Calcium 9.6 03/01/2019 10:59 AM    Bilirubin, total 0.3 03/01/2019 10:59 AM    AST (SGOT) 15 03/01/2019 10:59 AM    Alk. phosphatase 47 03/01/2019 10:59 AM    Protein, total 7.0 03/01/2019 10:59 AM    Albumin 4.0 03/01/2019 10:59 AM    Globulin 3.4 10/18/2017 08:17 AM    A-G Ratio 1.3 03/01/2019 10:59 AM    ALT (SGPT) 11 03/01/2019 10:59 AM     Lab Results   Component Value Date/Time    WBC 6.0 01/17/2018 08:24 AM    HGB 13.0 01/17/2018 08:24 AM    HCT 41.4 01/17/2018 08:24 AM    PLATELET 054 80/09/1721 08:24 AM    MCV 83 01/17/2018 08:24 AM     Wt Readings from Last 3 Encounters:   07/03/19 157 lb (71.2 kg)   03/01/19 154 lb 12.8 oz (70.2 kg)   11/19/18 150 lb 9.6 oz (68.3 kg)     BP Readings from Last 3 Encounters:   07/03/19 134/87   03/01/19 128/80   11/19/18 130/84     Hypertension ROS: taking medications as instructed, no medication side effects noted, no TIA's, no chest pain on exertion, no dyspnea on exertion, no swelling of ankles. Review of Systems - Gastrointestinal ROS: no abdominal pain, change in bowel habits, or black or bloody stools  positive for - reflux symptoms  Dermatological ROS: positive for - abrasion to forehead    New concerns: states she continues to have increased stress with her  and him not caring for himself. Comments she would like to go talk with someone.    States she hit her head on a tree branch in the yard after bending over to  something in the yard. States she does continue to have some soreness to the area. Denies LOC. GERD: states when she lays down approximately 1 hr after eating she will feel like something is cutting her breath off. Reports she jump up and symptoms will resolve. States symptoms have been present for the last 3-4 years. Reports she doesn't experience symptoms if she sits up for a while after eating. Denies dysphagia. Objective:   Visit Vitals  /87 (BP 1 Location: Left arm)   Pulse 82   Temp 97.7 °F (36.5 °C) (Oral)   Resp 16   Ht 5' 3\" (1.6 m)   Wt 157 lb (71.2 kg)   SpO2 97%   BMI 27.81 kg/m²      General appearance - alert, well appearing, and in no distress  Neck - supple, no significant adenopathy, carotids upstroke normal bilaterally, no bruits  Chest - clear to auscultation, no wheezes, rales or rhonchi, symmetric air entry  Heart - normal rate, regular rhythm, normal S1, S2, no murmurs, rubs, clicks or gallops  Extremities - peripheral pulses normal, no pedal edema, no clubbing or cyanosis  Abdomen - soft, nondistended, no masses or organomegaly, epigastric tenderness  Head - healing abrasion present to left side of forehead above brow. Area slightly tender to palpation. Assessment/Plan:      ICD-10-CM ICD-9-CM    1. Pure hypercholesterolemia E78.00 272.0 LIPID PANEL      METABOLIC PANEL, COMPREHENSIVE   2. Essential hypertension, benign I10 401.1 LIPID PANEL      METABOLIC PANEL, COMPREHENSIVE   3. Abrasion of forehead, initial encounter S00.81XA 910.0    4.  Gastroesophageal reflux disease, esophagitis presence not specified K21.9 530.81 REFERRAL TO GASTROENTEROLOGY     Orders Placed This Encounter    LIPID PANEL    METABOLIC PANEL, COMPREHENSIVE    METABOLIC PANEL, COMPREHENSIVE    LIPID PANEL    REFERRAL TO GASTROENTEROLOGY    omeprazole (PRILOSEC) 20 mg capsule       I have discussed the diagnosis with the patient and the intended plan as seen in the above orders. The patient has received an after-visit summary and questions were answered concerning future plans. I have discussed medication side effects and warnings with the patient as well. Patient agreeable with above plan and verbalizes understanding. Follow-up and Dispositions    · Return in about 3 months (around 10/3/2019) for HTN/HLD.

## 2019-07-07 LAB
ALBUMIN SERPL-MCNC: 4.1 G/DL (ref 3.5–4.8)
ALBUMIN/GLOB SERPL: 1.5 {RATIO} (ref 1.2–2.2)
ALP SERPL-CCNC: 46 IU/L (ref 39–117)
ALT SERPL-CCNC: 8 IU/L (ref 0–32)
AST SERPL-CCNC: 15 IU/L (ref 0–40)
BILIRUB SERPL-MCNC: 0.3 MG/DL (ref 0–1.2)
BUN SERPL-MCNC: 17 MG/DL (ref 8–27)
BUN/CREAT SERPL: 16 (ref 12–28)
CALCIUM SERPL-MCNC: 9.6 MG/DL (ref 8.7–10.3)
CHLORIDE SERPL-SCNC: 101 MMOL/L (ref 96–106)
CHOLEST SERPL-MCNC: 160 MG/DL (ref 100–199)
CO2 SERPL-SCNC: 23 MMOL/L (ref 20–29)
CREAT SERPL-MCNC: 1.04 MG/DL (ref 0.57–1)
GLOBULIN SER CALC-MCNC: 2.8 G/DL (ref 1.5–4.5)
GLUCOSE SERPL-MCNC: 85 MG/DL (ref 65–99)
HDLC SERPL-MCNC: 54 MG/DL
LDLC SERPL CALC-MCNC: 98 MG/DL (ref 0–99)
POTASSIUM SERPL-SCNC: 3.9 MMOL/L (ref 3.5–5.2)
PROT SERPL-MCNC: 6.9 G/DL (ref 6–8.5)
SODIUM SERPL-SCNC: 140 MMOL/L (ref 134–144)
TRIGL SERPL-MCNC: 40 MG/DL (ref 0–149)
VLDLC SERPL CALC-MCNC: 8 MG/DL (ref 5–40)

## 2019-08-02 ENCOUNTER — HOSPITAL ENCOUNTER (OUTPATIENT)
Dept: MAMMOGRAPHY | Age: 74
Discharge: HOME | End: 2019-08-02
Attending: NURSE PRACTITIONER
Payer: MEDICARE

## 2019-08-02 DIAGNOSIS — Z12.39 BREAST SCREENING, UNSPECIFIED: ICD-10-CM

## 2019-08-02 PROCEDURE — 77063 BREAST TOMOSYNTHESIS BI: CPT

## 2019-10-16 ENCOUNTER — OFFICE VISIT (OUTPATIENT)
Dept: FAMILY MEDICINE CLINIC | Age: 74
End: 2019-10-16

## 2019-10-16 VITALS
HEART RATE: 79 BPM | DIASTOLIC BLOOD PRESSURE: 81 MMHG | SYSTOLIC BLOOD PRESSURE: 132 MMHG | RESPIRATION RATE: 16 BRPM | OXYGEN SATURATION: 98 % | WEIGHT: 152 LBS | BODY MASS INDEX: 26.93 KG/M2 | HEIGHT: 63 IN | TEMPERATURE: 98.1 F

## 2019-10-16 DIAGNOSIS — E78.00 PURE HYPERCHOLESTEROLEMIA: ICD-10-CM

## 2019-10-16 DIAGNOSIS — I10 ESSENTIAL HYPERTENSION, BENIGN: Primary | ICD-10-CM

## 2019-10-16 NOTE — PATIENT INSTRUCTIONS
Influenza Virus Vaccine (By injection)   Influenza Virus Vaccine (in-floo-EN-za VYE-selina VAX-een)  Helps prevent infection with influenza (flu) virus. Brand Name(s): Afluria [id number] Formula, Uri Jurist [id number] Formula, Afluria Quadrivalent 1242-4327 Formula, Afluria Quadrivalent [id number] Formula, FluLaval Quadrivalent 6937-0755 Formula, FluLaval Quadrivalent 3332-3648 Formula, Fluad 8872-8615 Formula, Fluad 3324-5691 Formula, Fluarix Quadrivalent 8607-1257 Formula, Fluarix Quadrivalent 0620-1485 Formula, Flublok 5931-6203 Formula, Flublok 1550-0089 Formula, Flublok Quadrivalent 1766-4552 Formula, Flucelvax Quadrivalent 6180-7178 Formula, Flucelvax Quadrivalent 0313-2729 Formula   There may be other brand names for this medicine. When This Medicine Should Not Be Used: This vaccine is not right for everyone. You should not receive it if you had an allergic reaction to flu vaccine. If you are allergic to eggs, tell the caregiver who is going to give you the injection. Some brands of this vaccine contain egg proteins and could cause an allergic reaction. How to Use This Medicine:   Injectable  · The vaccine is given as a shot into a muscle or into your skin, usually in the shoulder area. The shot could be given in the thigh for babies and young children. · A nurse or other health provider will give you this medicine. · Read and follow the patient instructions that come with this medicine. Talk to your doctor or pharmacist if you have any questions. · A child who is younger than 5years old and who has not had a flu shot before may need 2 shots. The second shot should be given about 1 month after the first.  · Missed dose: Most people need only 1 dose of the vaccine. If your child needs a second dose, it is important for the vaccine to be given on schedule. If you must cancel an appointment, make a new one right away.   Drugs and Foods to Avoid:   Ask your doctor or pharmacist before using any other medicine, including over-the-counter medicines, vitamins, and herbal products. · Tell your doctor if you are using a medicine or treatment that weakens your immune system, such as a steroid, radiation, or cancer treatment. This vaccine may not work as well if you are also using these medicines. However, your doctor may still want you to get the vaccine because it can give you some protection. Warnings While Using This Medicine:   · Tell your doctor if you are pregnant or breastfeeding or if you have a weak immune system. · Tell your doctor if you ever had an unusual reaction to a flu shot, such as Guillain-Barré syndrome, or if you are allergic to latex. · The flu vaccine may not protect everyone who receives it. This vaccine will not treat flu symptoms if you have already been infected with the virus. Possible Side Effects While Using This Medicine:   Call your doctor right away if you notice any of these side effects:  · Allergic reaction: Itching or hives, swelling in your face or hands, swelling or tingling in your mouth or throat, chest tightness, trouble breathing  · Fainting, dizziness, or lightheadedness  · Fever over 103 degrees F  · Seizures  · Severe muscle weakness  If you notice these less serious side effects, talk with your doctor:   · Headache, muscle pain, tiredness  · Irritability or crying (in a child)  · Redness, pain, swelling, soreness, or a lump where the shot was given  If you notice other side effects that you think are caused by this medicine, tell your doctor. Call your doctor for medical advice about side effects. You may report side effects to FDA at 5-232-SLS-7233  © 2017 [facility] Information is for End User's use only and may not be sold, redistributed or otherwise used for commercial purposes. The above information is an  only. It is not intended as medical advice for individual conditions or treatments.  Talk to your doctor, nurse or pharmacist before following any medical regimen to see if it is safe and effective for you.

## 2019-10-16 NOTE — PROGRESS NOTES
Subjective:   Pancho Govea is a 69 y.o. female with hypertension presents for 3 months follow up. Patient Active Problem List   Diagnosis Code    Essential hypertension, benign I10    Pure hypercholesterolemia E78.00    GERD (gastroesophageal reflux disease) K21.9    Vitamin D deficiency E55.9    PND (paroxysmal nocturnal dyspnea) R06.00    Pain in heel M79.673    Situational stress F43.9    Influenza vaccination declined Z28.21    23-polyvalent pneumococcal polysaccharide vaccine declined Z28.21    Overweight (BMI 25.0-29. 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 60 Cap 0    simvastatin (ZOCOR) 10 mg tablet TAKE 1 TABLET EVERY NIGHT 90 Tab 1    metoprolol succinate (TOPROL-XL) 100 mg tablet TAKE 1 TABLET EVERY DAY 90 Tab 1    aspirin delayed-release 81 mg tablet Take 81 mg by mouth every other day. Does not take daily      omega-3 fatty acids-vitamin e (FISH OIL) 1,000 mg Cap Take 1 Cap by mouth daily.  Cholecalciferol, Vitamin D3, (VITAMIN D) 2,000 unit Cap Take  by mouth daily.         Allergies   Allergen Reactions    Naproxen Palpitations     Past Surgical History:   Procedure Laterality Date    HX HYSTERECTOMY  1960's    SD COLONOSCOPY FLX DX W/COLLJ SPEC WHEN PFRMD  2004    normal ; Dr. Corine Jacinto     Family History   Problem Relation Age of Onset    Hypertension Mother     Arthritis-osteo Mother     Heart Attack Father     Hypertension Sister     High Cholesterol Sister     Breast Cancer Sister     Hypertension Brother       Lab Results   Component Value Date/Time    Cholesterol, total 160 07/03/2019 09:47 AM    HDL Cholesterol 54 07/03/2019 09:47 AM    LDL, calculated 98 07/03/2019 09:47 AM    VLDL, calculated 8 07/03/2019 09:47 AM    Triglyceride 40 07/03/2019 09:47 AM    CHOL/HDL Ratio 3.3 04/25/2018 07:42 AM     Lab Results   Component Value Date/Time    Sodium 140 07/03/2019 09:47 AM    Potassium 3.9 07/03/2019 09:47 AM    Chloride 101 07/03/2019 09:47 AM    CO2 23 07/03/2019 09:47 AM    Anion gap 8 10/18/2017 08:17 AM    Glucose 85 07/03/2019 09:47 AM    BUN 17 07/03/2019 09:47 AM    Creatinine 1.04 (H) 07/03/2019 09:47 AM    BUN/Creatinine ratio 16 07/03/2019 09:47 AM    GFR est AA 62 07/03/2019 09:47 AM    GFR est non-AA 54 (L) 07/03/2019 09:47 AM    Calcium 9.6 07/03/2019 09:47 AM    Bilirubin, total 0.3 07/03/2019 09:47 AM    AST (SGOT) 15 07/03/2019 09:47 AM    Alk. phosphatase 46 07/03/2019 09:47 AM    Protein, total 6.9 07/03/2019 09:47 AM    Albumin 4.1 07/03/2019 09:47 AM    Globulin 3.4 10/18/2017 08:17 AM    A-G Ratio 1.5 07/03/2019 09:47 AM    ALT (SGPT) 8 07/03/2019 09:47 AM     Lab Results   Component Value Date/Time    WBC 6.0 01/17/2018 08:24 AM    HGB 13.0 01/17/2018 08:24 AM    HCT 41.4 01/17/2018 08:24 AM    PLATELET 108 00/14/9185 08:24 AM    MCV 83 01/17/2018 08:24 AM     Wt Readings from Last 3 Encounters:   07/03/19 157 lb (71.2 kg)   03/01/19 154 lb 12.8 oz (70.2 kg)   11/19/18 150 lb 9.6 oz (68.3 kg)     BP Readings from Last 3 Encounters:   07/03/19 134/87   03/01/19 128/80   11/19/18 130/84       Hypertension ROS: taking medications as instructed, no medication side effects noted, no TIA's, no chest pain on exertion, no dyspnea on exertion, no swelling of ankles. New concerns: patient states her  recently passed. Comments she has been coping well and has a good support system with family and friends.      Objective:   Visit Vitals  /81   Pulse 79   Temp 98.1 °F (36.7 °C) (Oral)   Resp 16   Ht 5' 3\" (1.6 m)   Wt 152 lb (68.9 kg)   SpO2 98%   BMI 26.93 kg/m²      General appearance - alert, well appearing, and in no distress  Neck - supple, no significant adenopathy, carotids upstroke normal bilaterally, no bruits  Chest - clear to auscultation, no wheezes, rales or rhonchi, symmetric air entry  Heart - normal rate, regular rhythm, normal S1, S2, no murmurs, rubs, clicks or gallops  Extremities - peripheral pulses normal, no pedal edema, no clubbing or cyanosis  Abdomen - soft, nontender, nondistended, no masses or organomegaly        Assessment/Plan:      ICD-10-CM ICD-9-CM    1. Essential hypertension, benign I10 401.1 LIPID PANEL      METABOLIC PANEL, COMPREHENSIVE   2. Pure hypercholesterolemia E78.00 272.0 LIPID PANEL      METABOLIC PANEL, COMPREHENSIVE     Orders Placed This Encounter    LIPID PANEL    METABOLIC PANEL, COMPREHENSIVE        current treatment plan is effective, no change in therapy. I have discussed the diagnosis with the patient and the intended plan as seen in the above orders. The patient has received an after-visit summary and questions were answered concerning future plans. I have discussed medication side effects and warnings with the patient as well. Patient agreeable with above plan and verbalizes understanding. Follow-up and Dispositions    · Return in about 3 months (around 1/16/2020) for HTN/HLD.

## 2019-11-19 RX ORDER — HYDROCHLOROTHIAZIDE 12.5 MG/1
TABLET ORAL
Qty: 90 TAB | Refills: 0 | Status: SHIPPED | OUTPATIENT
Start: 2019-11-19 | End: 2020-01-22

## 2020-01-16 ENCOUNTER — OFFICE VISIT (OUTPATIENT)
Dept: FAMILY MEDICINE CLINIC | Age: 75
End: 2020-01-16

## 2020-01-16 VITALS
BODY MASS INDEX: 27.11 KG/M2 | WEIGHT: 153 LBS | DIASTOLIC BLOOD PRESSURE: 78 MMHG | SYSTOLIC BLOOD PRESSURE: 116 MMHG | HEART RATE: 76 BPM | HEIGHT: 63 IN | RESPIRATION RATE: 16 BRPM | TEMPERATURE: 97.6 F | OXYGEN SATURATION: 99 %

## 2020-01-16 DIAGNOSIS — I10 ESSENTIAL HYPERTENSION, BENIGN: Primary | ICD-10-CM

## 2020-01-16 DIAGNOSIS — E78.00 PURE HYPERCHOLESTEROLEMIA: ICD-10-CM

## 2020-01-16 DIAGNOSIS — R42 LIGHTHEADEDNESS: ICD-10-CM

## 2020-01-16 NOTE — PATIENT INSTRUCTIONS
Orthostatic Hypotension: Care Instructions Your Care Instructions Orthostatic hypotension is a quick drop in blood pressure. It happens when you get up from sitting or lying down. You may feel faint, lightheaded, or dizzy. When a person sits up or stands up, the body changes the way it pumps blood. This can slow the flow of blood to the brain for a very short time. And that can make you feel lightheaded. Many medicines can cause this problem, especially in older people. Lack of fluids (dehydration) or illnesses such as diabetes or heart disease also can cause it. Follow-up care is a key part of your treatment and safety. Be sure to make and go to all appointments, and call your doctor if you are having problems. It's also a good idea to know your test results and keep a list of the medicines you take. How can you care for yourself at home? · Tell your doctor about any problems you have with your medicines. · If your doctor prescribes medicine to help prevent a low blood pressure problem, take it exactly as prescribed. Call your doctor if you think you are having a problem with your medicine. · Drink plenty of fluids, enough so that your urine is light yellow or clear like water. Choose water and other caffeine-free clear liquids. If you have kidney, heart, or liver disease and have to limit fluids, talk with your doctor before you increase the amount of fluids you drink. · Limit or avoid alcohol and caffeine. · Get up slowly from bed or after sitting for a long time. If you are in bed, roll to your side and swing your legs over the edge of the bed and onto the floor. Push your body up to a sitting position. Wait for a while before you slowly stand up. If you are dizzy or lightheaded, sit or lie down. When should you call for help? Call 911 anytime you think you may need emergency care. For example, call if: 
  · You passed out (lost consciousness).  Watch closely for changes in your health, and be sure to contact your doctor if: 
  · You do not get better as expected. Where can you learn more? Go to http://rad-batool.info/. Enter E227 in the search box to learn more about \"Orthostatic Hypotension: Care Instructions. \" Current as of: April 9, 2019 Content Version: 12.2 © 3711-8853 [employer]. Care instructions adapted under license by [employer] (which disclaims liability or warranty for this information). If you have questions about a medical condition or this instruction, always ask your healthcare professional. [place] [address] any warranty or liability for your use of this information.

## 2020-01-16 NOTE — PROGRESS NOTES
Subjective:   Christiana Saldaña is a 69 y.o. female with hypertension presents for 3 months follow up. Patient Active Problem List   Diagnosis Code    Essential hypertension, benign I10    Pure hypercholesterolemia E78.00    GERD (gastroesophageal reflux disease) K21.9    Vitamin D deficiency E55.9    PND (paroxysmal nocturnal dyspnea) R06.00    Pain in heel M79.673    Situational stress F43.9    Influenza vaccination declined Z28.21    23-polyvalent pneumococcal polysaccharide vaccine declined Z28.21    Overweight (BMI 25.0-29. 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 60 Cap 0    aspirin delayed-release 81 mg tablet Take 81 mg by mouth every other day. Does not take daily      omega-3 fatty acids-vitamin e (FISH OIL) 1,000 mg Cap Take 1 Cap by mouth daily.  Cholecalciferol, Vitamin D3, (VITAMIN D) 2,000 unit Cap Take  by mouth daily.       simvastatin (ZOCOR) 10 mg tablet TAKE 1 TABLET EVERY NIGHT 90 Tab 1      Allergies   Allergen Reactions    Naproxen Palpitations     Past Surgical History:   Procedure Laterality Date    HX HYSTERECTOMY  1960's    NE COLONOSCOPY FLX DX W/COLLJ SPEC WHEN PFRMD  2004    normal ; Dr. Sally Dukes     Family History   Problem Relation Age of Onset    Hypertension Mother     Arthritis-osteo Mother     Heart Attack Father     Hypertension Sister     High Cholesterol Sister     Breast Cancer Sister     Hypertension Brother       Lab Results   Component Value Date/Time    Cholesterol, total 160 07/03/2019 09:47 AM    HDL Cholesterol 54 07/03/2019 09:47 AM    LDL, calculated 98 07/03/2019 09:47 AM    VLDL, calculated 8 07/03/2019 09:47 AM    Triglyceride 40 07/03/2019 09:47 AM    CHOL/HDL Ratio 3.3 04/25/2018 07:42 AM     Lab Results   Component Value Date/Time    Sodium 140 07/03/2019 09:47 AM    Potassium 3.9 07/03/2019 09:47 AM    Chloride 101 07/03/2019 09:47 AM    CO2 23 07/03/2019 09:47 AM    Anion gap 8 10/18/2017 08:17 AM    Glucose 85 07/03/2019 09:47 AM    BUN 17 07/03/2019 09:47 AM    Creatinine 1.04 (H) 07/03/2019 09:47 AM    BUN/Creatinine ratio 16 07/03/2019 09:47 AM    GFR est AA 62 07/03/2019 09:47 AM    GFR est non-AA 54 (L) 07/03/2019 09:47 AM    Calcium 9.6 07/03/2019 09:47 AM    Bilirubin, total 0.3 07/03/2019 09:47 AM    AST (SGOT) 15 07/03/2019 09:47 AM    Alk. phosphatase 46 07/03/2019 09:47 AM    Protein, total 6.9 07/03/2019 09:47 AM    Albumin 4.1 07/03/2019 09:47 AM    Globulin 3.4 10/18/2017 08:17 AM    A-G Ratio 1.5 07/03/2019 09:47 AM    ALT (SGPT) 8 07/03/2019 09:47 AM     Lab Results   Component Value Date/Time    WBC 6.0 01/17/2018 08:24 AM    HGB 13.0 01/17/2018 08:24 AM    HCT 41.4 01/17/2018 08:24 AM    PLATELET 053 31/06/6826 08:24 AM    MCV 83 01/17/2018 08:24 AM     Wt Readings from Last 3 Encounters:   01/16/20 153 lb (69.4 kg)   10/16/19 152 lb (68.9 kg)   07/03/19 157 lb (71.2 kg)       BP Readings from Last 3 Encounters:   01/16/20 115/81   10/16/19 132/81   07/03/19 134/87       Hypertension ROS: taking medications as instructed, no medication side effects noted, no TIA's, no chest pain on exertion, no dyspnea on exertion, no swelling of ankles. Review of Systems - Neurological ROS: positive for - intermittent lightheadedness    New concerns: patient states for the lasts 2 days she been feeling episodes of lightheadedness. 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   Left leg: reports she has been having left leg pain at night for the last month. Reports she does not have pain during the day. Reports she has similar pain in the past when she was taking lipitor and also another statin. Vitals:    01/16/20 1303 01/16/20 1345 01/16/20 1346 01/16/20 1347   BP: 115/81 130/80 126/80 116/78   BP 1 Location: Right arm Left arm Left arm Left arm   BP Patient Position: Sitting Comment: lying Sitting Standing   Pulse: 78 60 63 76   Resp: 16      Temp: 97.6 °F (36.4 °C)      TempSrc: Oral      SpO2: 99%      Weight: 153 lb (69.4 kg)      Height: 5' 3\" (1.6 m)            Objective:   Visit Vitals  /81 (BP 1 Location: Right arm, BP Patient Position: Sitting)   Pulse 78   Temp 97.6 °F (36.4 °C) (Oral)   Resp 16   Ht 5' 3\" (1.6 m)   Wt 153 lb (69.4 kg)   SpO2 99%   BMI 27.10 kg/m²      General appearance - alert, well appearing, and in no distress  Neck - supple, no significant adenopathy, carotids upstroke normal bilaterally, no bruits  Chest - clear to auscultation, no wheezes, rales or rhonchi, symmetric air entry  Heart - normal rate, regular rhythm, normal S1, S2, no murmurs, rubs, clicks or gallops  Extremities - peripheral pulses normal, no pedal edema, no clubbing or cyanosis  Abdomen - soft, nontender, nondistended, no masses or organomegaly        Assessment/Plan:      ICD-10-CM ICD-9-CM    1. Essential hypertension, benign U22 031.5 METABOLIC PANEL, COMPREHENSIVE      LIPID PANEL   2. Pure hypercholesterolemia I06.63 668.7 METABOLIC PANEL, COMPREHENSIVE      LIPID PANEL   3. Lightheadedness R42 780.4      Orders Placed This Encounter    METABOLIC PANEL, COMPREHENSIVE    LIPID PANEL       alarm signs when to seek emergent care provided and reviewed   I have discussed the diagnosis with the patient and the intended plan as seen in the above orders. The patient has received an after-visit summary and questions were answered concerning future plans.   I have discussed medication side effects and warnings with the patient as well. Patient agreeable with above plan and verbalizes understanding. Follow-up and Dispositions    · Return in about 2 weeks (around 1/30/2020) for dizziness.

## 2020-01-16 NOTE — PROGRESS NOTES
Chief Complaint   Patient presents with    Hypertension    Cholesterol Problem     1. Have you been to the ER, urgent care clinic since your last visit? Hospitalized since your last visit? no    2. Have you seen or consulted any other health care providers outside of the [address] [place] [address] since your last visit? Include any pap smears or colon screening.  no

## 2020-02-05 ENCOUNTER — OFFICE VISIT (OUTPATIENT)
Dept: FAMILY MEDICINE CLINIC | Age: 75
End: 2020-02-05

## 2020-02-05 VITALS
WEIGHT: 155 LBS | HEIGHT: 63 IN | SYSTOLIC BLOOD PRESSURE: 136 MMHG | HEART RATE: 63 BPM | DIASTOLIC BLOOD PRESSURE: 71 MMHG | RESPIRATION RATE: 18 BRPM | OXYGEN SATURATION: 98 % | BODY MASS INDEX: 27.46 KG/M2 | TEMPERATURE: 97.9 F

## 2020-02-05 DIAGNOSIS — E55.9 VITAMIN D DEFICIENCY: Primary | ICD-10-CM

## 2020-02-05 DIAGNOSIS — R42 LIGHTHEADEDNESS: ICD-10-CM

## 2020-02-05 DIAGNOSIS — M79.10 MYALGIA: ICD-10-CM

## 2020-02-05 DIAGNOSIS — E78.00 PURE HYPERCHOLESTEROLEMIA: ICD-10-CM

## 2020-02-05 DIAGNOSIS — I10 ESSENTIAL HYPERTENSION, BENIGN: ICD-10-CM

## 2020-02-05 NOTE — PATIENT INSTRUCTIONS
Continue prevention measures: calcium 1200 mg daily with vitamin d 800 international units daily. Also weight bearing exercises daily (walking, treadmill, small weights for strength training 2-4 pounds).

## 2020-02-05 NOTE — PROGRESS NOTES
HISTORY OF PRESENT ILLNESS  Yonatan Desouza is a 69 y.o. female presents today to follow up for dizziness and muscle aches  States dizziness has resolved. States she was seen by her [facility] nurse and upon med rec it was discovered she was only taking Vitamin D 1,000 iu due to [facility] not having 2,000 iu when she reordered her vitamins. States she began taking 2,000 iu for the last week and bilateral leg pain has greatly improved. Allergies   Allergen Reactions    Naproxen Palpitations     Current Outpatient Medications   Medication Sig Dispense Refill    hydroCHLOROthiazide (HYDRODIURIL) 12.5 mg tablet TAKE 1 TABLET EVERY DAY 90 Tab 1    metoprolol succinate (TOPROL-XL) 100 mg tablet TAKE 1 TABLET EVERY DAY 90 Tab 0    omeprazole (PRILOSEC) 20 mg capsule Take 1 Cap by mouth two (2) times a day. 60 Cap 0    simvastatin (ZOCOR) 10 mg tablet TAKE 1 TABLET EVERY NIGHT 90 Tab 1    aspirin delayed-release 81 mg tablet Take 81 mg by mouth every other day. Does not take daily      omega-3 fatty acids-vitamin e (FISH OIL) 1,000 mg Cap Take 1 Cap by mouth daily.  Cholecalciferol, Vitamin D3, (VITAMIN D) 2,000 unit Cap Take  by mouth daily.        Past Medical History:   Diagnosis Date    Fibrocystic disease of breast     GERD (gastroesophageal reflux disease)     Hx of dizziness     Hypercholesterolemia     Hypertension     PND (paroxysmal nocturnal dyspnea)     Evaluated by Dr. Jason Martinez (March 2012)    Vaginitis atrophic 7-29-09    Vitamin D deficiency 4-2009     Social History     Socioeconomic History    Marital status:      Spouse name: Not on file    Number of children: Not on file    Years of education: Not on file    Highest education level: Not on file   Occupational History    Not on file   Social Needs    Financial resource strain: Not on file    Food insecurity:     Worry: Not on file     Inability: Not on file    Transportation needs:     Medical: Not on file     Non-medical: Not on file   Tobacco Use    Smoking status: Former Smoker     Packs/day: 0.50     Years: 15.00     Pack years: 7.50     Last attempt to quit: 1979     Years since quittin.1    Smokeless tobacco: Never Used   Substance and Sexual Activity    Alcohol use: No    Drug use: No    Sexual activity: Not on file   Lifestyle    Physical activity:     Days per week: Not on file     Minutes per session: Not on file    Stress: Not on file   Relationships    Social connections:     Talks on phone: Not on file     Gets together: Not on file     Attends Sabianism service: Not on file     Active member of club or organization: Not on file     Attends meetings of clubs or organizations: Not on file     Relationship status: Not on file    Intimate partner violence:     Fear of current or ex partner: Not on file     Emotionally abused: Not on file     Physically abused: Not on file     Forced sexual activity: Not on file   Other Topics Concern    Not on file   Social History Narrative    Not on file     Wt Readings from Last 3 Encounters:   20 155 lb (70.3 kg)   20 153 lb (69.4 kg)   10/16/19 152 lb (68.9 kg)     BP Readings from Last 3 Encounters:   20 136/71   20 116/78   10/16/19 132/81     Review of Systems   Constitutional: Negative for chills and fever. Respiratory: Negative for shortness of breath. Cardiovascular: Negative for chest pain and palpitations. Musculoskeletal: Positive for myalgias (improved). Neurological: Negative for dizziness and headaches. /71 (BP 1 Location: Right arm, BP Patient Position: Sitting)   Pulse 63   Temp 97.9 °F (36.6 °C) (Oral)   Resp 18   Ht 5' 3\" (1.6 m)   Wt 155 lb (70.3 kg)   SpO2 98%   BMI 27.46 kg/m²     Physical Exam  Constitutional:       Appearance: She is well-developed. HENT:      Head: Normocephalic and atraumatic. Neck:      Musculoskeletal: Normal range of motion and neck supple.    Cardiovascular:      Rate and Rhythm: Normal rate and regular rhythm. Heart sounds: Normal heart sounds. No murmur. No friction rub. No gallop. Pulmonary:      Effort: Pulmonary effort is normal.      Breath sounds: Normal breath sounds. No wheezing, rhonchi or rales. Skin:     General: Skin is warm and dry. Neurological:      Mental Status: She is alert and oriented to person, place, and time. ASSESSMENT and PLAN    ICD-10-CM ICD-9-CM    1. Vitamin D deficiency E55.9 268.9 VITAMIN D, 25 HYDROXY   2. Lightheadedness R42 780.4    3. Myalgia M79.10 729.1 CK     Orders Placed This Encounter    VITAMIN D, 25 HYDROXY    CK       I have discussed the diagnosis with the patient and the intended plan as seen in the above orders. The patient has received an after-visit summary and questions were answered concerning future plans. I have discussed medication side effects and warnings with the patient as well. Patient agreeable with above plan and verbalizes understanding. Follow-up and Dispositions    · Return in about 4 months (around 6/5/2020), or if symptoms worsen or fail to improve, for HTN/HLD.

## 2020-02-07 LAB
25(OH)D3+25(OH)D2 SERPL-MCNC: 85.1 NG/ML (ref 30–100)
ALBUMIN SERPL-MCNC: 4 G/DL (ref 3.7–4.7)
ALBUMIN/GLOB SERPL: 1.4 {RATIO} (ref 1.2–2.2)
ALP SERPL-CCNC: 49 IU/L (ref 39–117)
ALT SERPL-CCNC: 12 IU/L (ref 0–32)
AST SERPL-CCNC: 15 IU/L (ref 0–40)
BILIRUB SERPL-MCNC: 0.3 MG/DL (ref 0–1.2)
BUN SERPL-MCNC: 14 MG/DL (ref 8–27)
BUN/CREAT SERPL: 15 (ref 12–28)
CALCIUM SERPL-MCNC: 9.6 MG/DL (ref 8.7–10.3)
CHLORIDE SERPL-SCNC: 101 MMOL/L (ref 96–106)
CHOLEST SERPL-MCNC: 218 MG/DL (ref 100–199)
CK SERPL-CCNC: 90 U/L (ref 24–173)
CO2 SERPL-SCNC: 23 MMOL/L (ref 20–29)
CREAT SERPL-MCNC: 0.94 MG/DL (ref 0.57–1)
GLOBULIN SER CALC-MCNC: 2.9 G/DL (ref 1.5–4.5)
GLUCOSE SERPL-MCNC: 84 MG/DL (ref 65–99)
HDLC SERPL-MCNC: 60 MG/DL
LDLC SERPL CALC-MCNC: 148 MG/DL (ref 0–99)
POTASSIUM SERPL-SCNC: 4.5 MMOL/L (ref 3.5–5.2)
PROT SERPL-MCNC: 6.9 G/DL (ref 6–8.5)
SODIUM SERPL-SCNC: 142 MMOL/L (ref 134–144)
TRIGL SERPL-MCNC: 48 MG/DL (ref 0–149)
VLDLC SERPL CALC-MCNC: 10 MG/DL (ref 5–40)

## 2020-02-07 RX ORDER — METOPROLOL SUCCINATE 100 MG/1
TABLET, EXTENDED RELEASE ORAL
Qty: 90 TAB | Refills: 0 | Status: SHIPPED | OUTPATIENT
Start: 2020-02-07 | End: 2020-04-08

## 2020-04-08 RX ORDER — METOPROLOL SUCCINATE 100 MG/1
TABLET, EXTENDED RELEASE ORAL
Qty: 90 TAB | Refills: 0 | Status: SHIPPED | OUTPATIENT
Start: 2020-04-08 | End: 2020-06-01

## 2020-06-01 RX ORDER — HYDROCHLOROTHIAZIDE 12.5 MG/1
TABLET ORAL
Qty: 90 TAB | Refills: 1 | Status: SHIPPED | OUTPATIENT
Start: 2020-06-01 | End: 2020-11-03

## 2020-06-01 RX ORDER — METOPROLOL SUCCINATE 100 MG/1
TABLET, EXTENDED RELEASE ORAL
Qty: 90 TAB | Refills: 0 | Status: SHIPPED | OUTPATIENT
Start: 2020-06-01 | End: 2020-08-05

## 2020-06-05 ENCOUNTER — VIRTUAL VISIT (OUTPATIENT)
Dept: FAMILY MEDICINE CLINIC | Age: 75
End: 2020-06-05

## 2020-06-05 DIAGNOSIS — K44.9 HIATAL HERNIA: ICD-10-CM

## 2020-06-05 DIAGNOSIS — Z00.00 MEDICARE ANNUAL WELLNESS VISIT, SUBSEQUENT: ICD-10-CM

## 2020-06-05 DIAGNOSIS — E78.00 PURE HYPERCHOLESTEROLEMIA: ICD-10-CM

## 2020-06-05 DIAGNOSIS — I10 ESSENTIAL HYPERTENSION, BENIGN: Primary | ICD-10-CM

## 2020-06-05 DIAGNOSIS — E55.9 VITAMIN D DEFICIENCY: ICD-10-CM

## 2020-06-05 NOTE — PROGRESS NOTES
Tc Parra is a 69 y.o. female who was seen by synchronous (real-time) audio-video technology on 6/5/2020. Consent: Tc Parra, who was seen by synchronous (real-time) audio-video technology, and/or her healthcare decision maker, is aware that this patient-initiated, Telehealth encounter on 6/5/2020 is a billable service, with coverage as determined by her insurance carrier. She is aware that she may receive a bill and has provided verbal consent to proceed: Yes. Assessment & Plan:   Diagnoses and all orders for this visit:    1. Essential hypertension, benign  -     LIPID PANEL; Future  -     METABOLIC PANEL, COMPREHENSIVE; Future    2. Medicare annual wellness visit, subsequent    3. Vitamin D deficiency  -     VITAMIN D, 25 HYDROXY; Future    4. Pure hypercholesterolemia  -     LIPID PANEL; Future  -     METABOLIC PANEL, COMPREHENSIVE; Future    5. Hiatal hernia  -     XR ABD (AP AND ERECT OR DECUB); Future      Follow-up and Dispositions    · Return in about 4 months (around 10/5/2020) for HTN/HLD, in office follow up. I spent at least 16 minutes on this visit with this established patient. [phone]  Subjective:   Tc Parra is a 69 y.o. female who was seen for Cholesterol Problem; Hypertension; and Annual Wellness Visit    Hypertension ROS: taking medications as instructed, no medication side effects noted, no TIA's, no chest pain on exertion, no dyspnea on exertion, no swelling of ankles. Patient reports she is having increased symptoms of GERD. Reports she also has hiatal hernia. Reports she will feel stinging sensation in sternal region. Reports when she rubs the area, belches and gets up to walk this will help relieve the pain. Reports symptoms are controlled with prilosec 20 mg bid. Comments she would like hiatal hernia reevaluated. Prior to Admission medications    Medication Sig Start Date End Date Taking?  Authorizing Provider   hydroCHLOROthiazide (HYDRODIURIL) 12.5 mg tablet TAKE 1 TABLET EVERY DAY 6/1/20   Vikash DOWELL NP   metoprolol succinate (TOPROL-XL) 100 mg tablet TAKE 1 TABLET EVERY DAY 6/1/20   Vikash DOWELL NP   omeprazole (PRILOSEC) 20 mg capsule Take 1 Cap by mouth two (2) times a day. 7/3/19   Vikash DOWELL NP   simvastatin (ZOCOR) 10 mg tablet TAKE 1 TABLET EVERY NIGHT 5/16/19   Vikash DOWELL NP   aspirin delayed-release 81 mg tablet Take 81 mg by mouth every other day. Does not take daily    Provider, Historical   omega-3 fatty acids-vitamin e (FISH OIL) 1,000 mg Cap Take 1 Cap by mouth daily. 4/6/10   Provider, Historical   Cholecalciferol, Vitamin D3, (VITAMIN D) 2,000 unit Cap Take  by mouth daily. 8/11/10   Provider, Historical     Allergies   Allergen Reactions    Naproxen Palpitations     Patient Active Problem List   Diagnosis Code    Essential hypertension, benign I10    Pure hypercholesterolemia E78.00    GERD (gastroesophageal reflux disease) K21.9    Vitamin D deficiency E55.9    PND (paroxysmal nocturnal dyspnea) R06.00    Pain in heel M79.673    Situational stress F43.9    Influenza vaccination declined Z28.21    23-polyvalent pneumococcal polysaccharide vaccine declined Z28.21    Overweight (BMI 25.0-29. 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    Osteopenia of multiple sites M85.89     Patient Active Problem List    Diagnosis Date Noted    Osteopenia of multiple sites 01/24/2018    Acquired absence of both cervix and uterus 01/25/2017    Overweight (BMI 25.0-29.9) 10/24/2016    Influenza vaccination declined 04/25/2016    23-polyvalent pneumococcal polysaccharide vaccine declined 04/25/2016    Situational stress 04/11/2016    Pain in heel 11/24/2014    PND (paroxysmal nocturnal dyspnea)     Vitamin D deficiency 03/11/2011    Essential hypertension, benign 04/06/2010    Pure hypercholesterolemia 04/06/2010    GERD (gastroesophageal reflux disease) 04/06/2010     Current Outpatient Medications   Medication Sig Dispense Refill    hydroCHLOROthiazide (HYDRODIURIL) 12.5 mg tablet TAKE 1 TABLET EVERY DAY 90 Tab 1    metoprolol succinate (TOPROL-XL) 100 mg tablet TAKE 1 TABLET EVERY DAY 90 Tab 0    omeprazole (PRILOSEC) 20 mg capsule Take 1 Cap by mouth two (2) times a day. 60 Cap 0    simvastatin (ZOCOR) 10 mg tablet TAKE 1 TABLET EVERY NIGHT 90 Tab 1    aspirin delayed-release 81 mg tablet Take 81 mg by mouth every other day. Does not take daily      omega-3 fatty acids-vitamin e (FISH OIL) 1,000 mg Cap Take 1 Cap by mouth daily.  Cholecalciferol, Vitamin D3, (VITAMIN D) 2,000 unit Cap Take  by mouth daily. Allergies   Allergen Reactions    Naproxen Palpitations     Past Medical History:   Diagnosis Date    Fibrocystic disease of breast     GERD (gastroesophageal reflux disease)     Hx of dizziness     Hypercholesterolemia     Hypertension     PND (paroxysmal nocturnal dyspnea)     Evaluated by Dr. Darrell Caceres (2012)    Vaginitis atrophic 09    Vitamin D deficiency      Past Surgical History:   Procedure Laterality Date    HX HYSTERECTOMY      MO COLONOSCOPY FLX DX W/COLLJ SPEC WHEN PFRMD  2004    normal ; Dr. Patrick Andrews     Family History   Problem Relation Age of Onset    Hypertension Mother     Arthritis-osteo Mother     Heart Attack Father     High Cholesterol Father     Hypertension Sister     High Cholesterol Sister     Breast Cancer Sister     Hypertension Brother      Social History     Tobacco Use    Smoking status: Former Smoker     Packs/day: 0.50     Years: 15.00     Pack years: 7.50     Last attempt to quit: 1979     Years since quittin.5    Smokeless tobacco: Never Used   Substance Use Topics    Alcohol use: No       Review of Systems   Constitutional: Negative for chills and fever. Respiratory: Negative for shortness of breath. Cardiovascular: Negative for chest pain and palpitations.    Gastrointestinal: Positive for heartburn. Negative for abdominal pain. Neurological: Negative for dizziness and headaches. Objective: There were no vitals taken for this visit. General: alert, cooperative, no distress   Mental  status: normal mood, behavior, speech, dress, motor activity, and thought processes, able to follow commands   HENT: NCAT   Neck: no visualized mass   Resp: no respiratory distress   Neuro: no gross deficits   Skin: no discoloration or lesions of concern on visible areas   Psychiatric: normal affect, consistent with stated mood, no evidence of hallucinations     Additional exam findings: We discussed the expected course, resolution and complications of the diagnosis(es) in detail. Medication risks, benefits, costs, interactions, and alternatives were discussed as indicated. I advised her to contact the office if her condition worsens, changes or fails to improve as anticipated. She expressed understanding with the diagnosis(es) and plan. Ezequiel Downs is a 69 y.o. female who was evaluated by a video visit encounter for concerns as above. Patient identification was verified prior to start of the visit. A caregiver was present when appropriate. Due to this being a TeleHealth encounter (During [facility]-41 public health emergency), evaluation of the following organ systems was limited: Vitals/Constitutional/EENT/Resp/CV/GI//MS/Neuro/Skin/Heme-Lymph-Imm. Pursuant to the emergency declaration under the [facility], 1135 waiver authority and the [employer] and [employer] Act, this Virtual  Visit was conducted, with patient's (and/or legal guardian's) consent, to reduce the patient's risk of exposure to COVID-19 and provide necessary medical care. Services were provided through a video synchronous discussion virtually to substitute for in-person clinic visit.   Patient was located in their home and I was in the office while conducting this encounter. Liz Esteban NP      This is the Subsequent Medicare Annual Wellness Exam, performed 12 months or more after the Initial AWV or the last Subsequent AWV    Consent: Tc Parra, who was seen by synchronous (real-time) audio-video technology, and/or her healthcare decision maker, is aware that this patient-initiated, Telehealth encounter on 6/5/2020 is a billable service. While AWVs are fully covered by Medicare, any services rendered on this date that are not included in an AWV are subject to additional billing, with coverage as determined by her insurance carrier. She is aware that she may receive a bill for any such additional services and has provided verbal consent to proceed: Yes. I have reviewed the patient's medical history in detail and updated the computerized patient record. History     Patient Active Problem List   Diagnosis Code    Essential hypertension, benign I10    Pure hypercholesterolemia E78.00    GERD (gastroesophageal reflux disease) K21.9    Vitamin D deficiency E55.9    PND (paroxysmal nocturnal dyspnea) R06.00    Pain in heel M79.673    Situational stress F43.9    Influenza vaccination declined Z28.21    23-polyvalent pneumococcal polysaccharide vaccine declined Z28.21    Overweight (BMI 25.0-29. 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 Cyndee Ness (March 2012)    Vaginitis atrophic 7-29-09    Vitamin D deficiency 4-2009      Past Surgical History:   Procedure Laterality Date    HX HYSTERECTOMY  1960's    [place] COLONOSCOPY FLX DX W/COLLJ SPEC WHEN PFRMD  2004    normal ; Dr. Lily Baeza     Current Outpatient Medications Medication Sig Dispense Refill    hydroCHLOROthiazide (HYDRODIURIL) 12.5 mg tablet TAKE 1 TABLET EVERY DAY 90 Tab 1    metoprolol succinate (TOPROL-XL) 100 mg tablet TAKE 1 TABLET EVERY DAY 90 Tab 0    omeprazole (PRILOSEC) 20 mg capsule Take 1 Cap by mouth two (2) times a day. 60 Cap 0    simvastatin (ZOCOR) 10 mg tablet TAKE 1 TABLET EVERY NIGHT 90 Tab 1    aspirin delayed-release 81 mg tablet Take 81 mg by mouth every other day. Does not take daily      omega-3 fatty acids-vitamin e (FISH OIL) 1,000 mg Cap Take 1 Cap by mouth daily.  Cholecalciferol, Vitamin D3, (VITAMIN D) 2,000 unit Cap Take  by mouth daily. Allergies   Allergen Reactions    Naproxen Palpitations       Family History   Problem Relation Age of Onset    Hypertension Mother    [employer] Arthritis-osteo Mother     Heart Attack Father     High Cholesterol Father     Hypertension Sister     High Cholesterol Sister     Breast Cancer Sister     Hypertension Brother      Social History     Tobacco Use    Smoking status: Former Smoker     Packs/day: 0.50     Years: 15.00     Pack years: 7.50     Last attempt to quit: 1979     Years since quittin.5    Smokeless tobacco: Never Used   Substance Use Topics    Alcohol use: No     Depression Risk Factor Screening:     3 most recent PHQ Screens 2020   PHQ Not Done Medical Reason (indicate in comments)   Little interest or pleasure in doing things Not at all   Feeling down, depressed, irritable, or hopeless Not at all   Total Score PHQ 2 0     Alcohol Risk Factor Screening:   Do you average 1 drink per night or more than 7 drinks a week:  No    On any one occasion in the past three months have you have had more than 3 drinks containing alcohol:  No      Functional Ability and Level of Safety:   Hearing: Hearing is good. Activities of Daily Living: The home contains: no safety equipment.   Patient does total self care    Ambulation: with no difficulty    Fall Risk:  Fall Risk Assessment, last 12 mths 2/5/2020   Able to walk? Yes   Fall in past 12 months? No       Abuse Screen:  Patient is not abused    Cognitive Screening   Has your family/caregiver stated any concerns about your memory: no    Patient Care Team   Patient Care Team:  Ashlee Lawton NP as PCP - General (Nurse Practitioner)  Ashlee Lawton NP as PCP - [facility]    Assessment/Plan   Education and counseling provided:  Are appropriate based on today's review and evaluation    Diagnoses and all orders for this visit:    1. Essential hypertension, benign  -     LIPID PANEL; Future  -     METABOLIC PANEL, COMPREHENSIVE; Future    2. Medicare annual wellness visit, subsequent    3. Vitamin D deficiency  -     VITAMIN D, 25 HYDROXY; Future    4. Pure hypercholesterolemia  -     LIPID PANEL; Future  -     METABOLIC PANEL, COMPREHENSIVE; Future    5. Hiatal hernia  -     XR ABD (AP AND ERECT OR DECUB); Future      Health Maintenance Due   Topic Date Due    Shingrix Vaccine Age 49> (1 of 2) 09/12/1997    Pneumococcal 65+ years (1 of 1 - PPSV23) 09/12/2012    Medicare Yearly Exam  10/23/2019    GLAUCOMA SCREENING Q2Y  11/29/2019       Renee Hung is a 69 y.o. female who was evaluated by an audio-video encounter for concerns as above. Patient identification was verified prior to start of the visit. A caregiver was present when appropriate. Due to this being a TeleHealth encounter (During [facility] public health emergency), evaluation of the following organ systems was limited: Vitals/Constitutional/EENT/Resp/CV/GI//MS/Neuro/Skin/Heme-Lymph-Imm.   Pursuant to the emergency declaration under the [facility], 1135 waiver authority and the [employer] Act, this Virtual Visit was conducted, with patient's (and/or legal guardian's) consent, to reduce the patient's risk of exposure to COVID-19 and provide necessary medical care.     Services were provided through a synchronous discussion virtually to substitute for in-person clinic visit. I was in the office. The patient was at home.       Juliette Romero NP

## 2020-06-05 NOTE — PATIENT INSTRUCTIONS
Medicare Wellness Visit, Female The best way to live healthy is to have a lifestyle where you eat a well-balanced diet, exercise regularly, limit alcohol use, and quit all forms of tobacco/nicotine, if applicable. Regular preventive services are another way to keep healthy. Preventive services (vaccines, screening tests, monitoring & exams) can help personalize your care plan, which helps you manage your own care. Screening tests can find health problems at the earliest stages, when they are easiest to treat. [facility] follows the current, evidence-based guidelines published by the [facility] ([facility]) when recommending preventive services for our patients. Because we follow these guidelines, sometimes recommendations change over time as research supports it. (For example, mammograms used to be recommended annually. Even though Medicare will still pay for an annual mammogram, the newer guidelines recommend a mammogram every two years for women of average risk). Of course, you and your doctor may decide to screen more often for some diseases, based on your risk and your co-morbidities (chronic disease you are already diagnosed with). Preventive services for you include: - Medicare offers their members a free annual wellness visit, which is time for you and your primary care provider to discuss and plan for your preventive service needs. Take advantage of this benefit every year! 
-All adults over the age of 72 should receive the recommended pneumonia vaccines. Current USPSTF guidelines recommend a series of two vaccines for the best pneumonia protection.  
-All adults should have a flu vaccine yearly and a tetanus vaccine every 10 years.  
-All adults age 48 and older should receive the shingles vaccines (series of two vaccines). -All adults age 38-68 who are overweight should have a diabetes screening test once every three years. -All adults born between [birth year] and 1965 should be screened once for Hepatitis C. 
-Other screening tests and preventive services for persons with diabetes include: an eye exam to screen for diabetic retinopathy, a kidney function test, a foot exam, and stricter control over your cholesterol.  
-Cardiovascular screening for adults with routine risk involves an electrocardiogram (ECG) at intervals determined by your doctor.  
-Colorectal cancer screenings should be done for adults age 54-65 with no increased risk factors for colorectal cancer. There are a number of acceptable methods of screening for this type of cancer. Each test has its own benefits and drawbacks. Discuss with your doctor what is most appropriate for you during your annual wellness visit. The different tests include: colonoscopy (considered the best screening method), a fecal occult blood test, a fecal DNA test, and sigmoidoscopy. 
 
-A bone mass density test is recommended when a woman turns 65 to screen for osteoporosis. This test is only recommended one time, as a screening. Some providers will use this same test as a disease monitoring tool if you already have osteoporosis. -Breast cancer screenings are recommended every other year for women of normal risk, age 54-69. 
-Cervical cancer screenings for women over age 72 are only recommended with certain risk factors. Here is a list of your current Health Maintenance items (your personalized list of preventive services) with a due date: 
Health Maintenance Due Topic Date Due  Shingles Vaccine (1 of 2) 09/12/1997  Pneumococcal Vaccine (1 of 1 - PPSV23) 09/12/2012 [facility] Annual Well Visit  10/23/2019  Glaucoma Screening   11/29/2019

## 2020-06-22 ENCOUNTER — HOSPITAL ENCOUNTER (OUTPATIENT)
Dept: GENERAL RADIOLOGY | Age: 75
Discharge: HOME | End: 2020-06-22
Payer: MEDICARE

## 2020-06-22 DIAGNOSIS — K44.9 HIATAL HERNIA: ICD-10-CM

## 2020-06-22 PROCEDURE — 74019 RADEX ABDOMEN 2 VIEWS: CPT

## 2020-09-01 RX ORDER — SIMVASTATIN 10 MG/1
10 TABLET, FILM COATED ORAL
Qty: 90 TAB | Refills: 1 | Status: SHIPPED | OUTPATIENT
Start: 2020-09-01 | End: 2021-01-06 | Stop reason: SDUPTHER

## 2020-09-01 NOTE — TELEPHONE ENCOUNTER
Last Visit: 6/5/20 with LAZARO Brush  Next Appointment: 10/6/20 with LAZARO Brush  Previous Refill Encounter(s): 5/16/19 #90 with 1 refill    Requested Prescriptions     Pending Prescriptions Disp Refills    simvastatin (ZOCOR) 10 mg tablet 90 Tab 1     Sig: Take 1 Tab by mouth nightly.

## 2020-09-01 NOTE — TELEPHONE ENCOUNTER
Requested Prescriptions     Pending Prescriptions Disp Refills    simvastatin (ZOCOR) 10 mg tablet 90 Tab 1      Pt completely out of medication.

## 2020-10-06 ENCOUNTER — OFFICE VISIT (OUTPATIENT)
Dept: FAMILY MEDICINE CLINIC | Age: 75
End: 2020-10-06
Payer: MEDICARE

## 2020-10-06 VITALS
DIASTOLIC BLOOD PRESSURE: 76 MMHG | TEMPERATURE: 97 F | HEART RATE: 64 BPM | RESPIRATION RATE: 16 BRPM | SYSTOLIC BLOOD PRESSURE: 120 MMHG | WEIGHT: 153 LBS | HEIGHT: 63 IN | BODY MASS INDEX: 27.11 KG/M2

## 2020-10-06 DIAGNOSIS — E78.00 PURE HYPERCHOLESTEROLEMIA: ICD-10-CM

## 2020-10-06 DIAGNOSIS — Z23 NEEDS FLU SHOT: ICD-10-CM

## 2020-10-06 DIAGNOSIS — E55.9 VITAMIN D DEFICIENCY: ICD-10-CM

## 2020-10-06 DIAGNOSIS — I10 ESSENTIAL HYPERTENSION, BENIGN: Primary | ICD-10-CM

## 2020-10-06 DIAGNOSIS — K44.9 HIATAL HERNIA: ICD-10-CM

## 2020-10-06 PROCEDURE — 90694 VACC AIIV4 NO PRSRV 0.5ML IM: CPT | Performed by: NURSE PRACTITIONER

## 2020-10-06 PROCEDURE — G8432 DEP SCR NOT DOC, RNG: HCPCS | Performed by: NURSE PRACTITIONER

## 2020-10-06 PROCEDURE — G8536 NO DOC ELDER MAL SCRN: HCPCS | Performed by: NURSE PRACTITIONER

## 2020-10-06 PROCEDURE — G8399 PT W/DXA RESULTS DOCUMENT: HCPCS | Performed by: NURSE PRACTITIONER

## 2020-10-06 PROCEDURE — 1090F PRES/ABSN URINE INCON ASSESS: CPT | Performed by: NURSE PRACTITIONER

## 2020-10-06 PROCEDURE — G8752 SYS BP LESS 140: HCPCS | Performed by: NURSE PRACTITIONER

## 2020-10-06 PROCEDURE — G8419 CALC BMI OUT NRM PARAM NOF/U: HCPCS | Performed by: NURSE PRACTITIONER

## 2020-10-06 PROCEDURE — 99214 OFFICE O/P EST MOD 30 MIN: CPT | Performed by: NURSE PRACTITIONER

## 2020-10-06 PROCEDURE — G8427 DOCREV CUR MEDS BY ELIG CLIN: HCPCS | Performed by: NURSE PRACTITIONER

## 2020-10-06 PROCEDURE — G9899 SCRN MAM PERF RSLTS DOC: HCPCS | Performed by: NURSE PRACTITIONER

## 2020-10-06 PROCEDURE — 3017F COLORECTAL CA SCREEN DOC REV: CPT | Performed by: NURSE PRACTITIONER

## 2020-10-06 PROCEDURE — G8754 DIAS BP LESS 90: HCPCS | Performed by: NURSE PRACTITIONER

## 2020-10-06 PROCEDURE — 1101F PT FALLS ASSESS-DOCD LE1/YR: CPT | Performed by: NURSE PRACTITIONER

## 2020-10-06 PROCEDURE — G0008 ADMIN INFLUENZA VIRUS VAC: HCPCS | Performed by: NURSE PRACTITIONER

## 2020-10-06 NOTE — PROGRESS NOTES
Subjective:   Choco James is a 70 y.o. female with hypertension presents for 4 months follow up. Patient Active Problem List   Diagnosis Code    Essential hypertension, benign I10    Pure hypercholesterolemia E78.00    GERD (gastroesophageal reflux disease) K21.9    Vitamin D deficiency E55.9    PND (paroxysmal nocturnal dyspnea) R06.00    Pain in heel M79.673    Situational stress F43.9    Influenza vaccination declined Z28.21    23-polyvalent pneumococcal polysaccharide vaccine declined Z28.21    Overweight (BMI 25.0-29. 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 90 Tab 1    metoprolol succinate (TOPROL-XL) 100 mg tablet TAKE 1 TABLET EVERY DAY 90 Tab 1    hydroCHLOROthiazide (HYDRODIURIL) 12.5 mg tablet TAKE 1 TABLET EVERY DAY 90 Tab 1    omeprazole (PRILOSEC) 20 mg capsule Take 1 Cap by mouth two (2) times a day. 60 Cap 0    aspirin delayed-release 81 mg tablet Take 81 mg by mouth every other day. Does not take daily      omega-3 fatty acids-vitamin e (FISH OIL) 1,000 mg Cap Take 1 Cap by mouth daily.  Cholecalciferol, Vitamin D3, (VITAMIN D) 2,000 unit Cap Take  by mouth daily.         Allergies   Allergen Reactions    Naproxen Palpitations     Past Surgical History:   Procedure Laterality Date    HX HYSTERECTOMY  1960's    [place] COLONOSCOPY FLX DX W/COLLJ SPEC WHEN PFRMD  2004    normal ; Dr. Randal Pitt     Family History   Problem Relation Age of Onset    Hypertension Mother    [place] Arthritis-osteo Mother     Heart Attack Father     High Cholesterol Father     Hypertension Sister     High Cholesterol Sister     Breast Cancer Sister     Hypertension Brother       Lab Results   Component Value Date/Time    Cholesterol, total 213 (H) 06/23/2020 08:14 AM    HDL Cholesterol 58 06/23/2020 08:14 AM LDL, calculated 143 (H) 06/23/2020 08:14 AM    VLDL, calculated 12 06/23/2020 08:14 AM    Triglyceride 60 06/23/2020 08:14 AM    CHOL/HDL Ratio 3.3 04/25/2018 07:42 AM     Lab Results   Component Value Date/Time    Sodium 140 06/23/2020 08:14 AM    Potassium 4.0 06/23/2020 08:14 AM    Chloride 102 06/23/2020 08:14 AM    CO2 25 06/23/2020 08:14 AM    Anion gap 8 10/18/2017 08:17 AM    Glucose 85 06/23/2020 08:14 AM    BUN 23 06/23/2020 08:14 AM    Creatinine 1.01 (H) 06/23/2020 08:14 AM    BUN/Creatinine ratio 23 06/23/2020 08:14 AM    GFR est AA 64 06/23/2020 08:14 AM    GFR est non-AA 56 (L) 06/23/2020 08:14 AM    Calcium 9.9 06/23/2020 08:14 AM    Bilirubin, total 0.4 06/23/2020 08:14 AM    Alk. phosphatase 45 06/23/2020 08:14 AM    Protein, total 6.9 06/23/2020 08:14 AM    Albumin 4.2 06/23/2020 08:14 AM    Globulin 3.4 10/18/2017 08:17 AM    A-G Ratio 1.6 06/23/2020 08:14 AM    ALT (SGPT) 11 06/23/2020 08:14 AM     Lab Results   Component Value Date/Time    WBC 6.0 01/17/2018 08:24 AM    HGB 13.0 01/17/2018 08:24 AM    HCT 41.4 01/17/2018 08:24 AM    PLATELET 130 26/38/5285 08:24 AM    MCV 83 01/17/2018 08:24 AM     Wt Readings from Last 3 Encounters:   02/05/20 155 lb (70.3 kg)   01/16/20 153 lb (69.4 kg)   10/16/19 152 lb (68.9 kg)       BP Readings from Last 3 Encounters:   02/05/20 136/71   01/16/20 116/78   10/16/19 132/81     Hypertension ROS: taking medications as instructed, no medication side effects noted, no TIA's, no chest pain on exertion, no dyspnea on exertion, no swelling of ankles. Patient states she has been walking every day and also been adhering to low cholesterol diet.     New concerns: none    BP Readings from Last 3 Encounters:   10/06/20 (!) 143/84   02/05/20 136/71   01/16/20 116/78       Objective:   Visit Vitals  /76 (BP 1 Location: Right arm, BP Patient Position: Sitting)   Pulse 64   Temp 97 °F (36.1 °C) (Temporal)   Resp 16   Ht 5' 3\" (1.6 m)   Wt 153 lb (69.4 kg)   BMI 27.10 kg/m²      General appearance - alert, well appearing, and in no distress  Neck - supple, no significant adenopathy, carotids upstroke normal bilaterally, no bruits  Chest - clear to auscultation, no wheezes, rales or rhonchi, symmetric air entry  Heart - normal rate, regular rhythm, normal S1, S2, no murmurs, rubs, clicks or gallops  Extremities - peripheral pulses normal, no pedal edema, no clubbing or cyanosis  Abdomen - soft, nontender, nondistended, no masses or organomegaly        Assessment/Plan:      ICD-10-CM ICD-9-CM    1. Essential hypertension, benign  Y04 359.6 METABOLIC PANEL, COMPREHENSIVE      LIPID PANEL   2. Pure hypercholesterolemia  K01.03 631.5 METABOLIC PANEL, COMPREHENSIVE      LIPID PANEL   3. Vitamin D deficiency  E55.9 268.9 VITAMIN D, 25 HYDROXY   4. Hiatal hernia  K44.9 553.3 REFERRAL TO GASTROENTEROLOGY     Orders Placed This Encounter    METABOLIC PANEL, COMPREHENSIVE    LIPID PANEL    VITAMIN D, 25 HYDROXY    REFERRAL TO GASTROENTEROLOGY       alarm signs when to seek emergent care provided and reviewed   I have discussed the diagnosis with the patient and the intended plan as seen in the above orders. The patient has received an after-visit summary and questions were answered concerning future plans. I have discussed medication side effects and warnings with the patient as well. Patient agreeable with above plan and verbalizes understanding. Follow-up and Dispositions    · Return in about 4 months (around 2/6/2021) for HTN/HLD, virtual follow up.

## 2020-10-06 NOTE — PATIENT INSTRUCTIONS
Hiatal Hernia: Care Instructions Your Care Instructions A hiatal hernia occurs when part of the stomach bulges into the chest cavity. A hiatal hernia may allow stomach acid and juices to back up into the esophagus (acid reflux). This can cause a feeling of burning, warmth, heat, or pain behind the breastbone. This feeling may often occur after you eat, soon after you lie down, or when you bend forward, and it may come and go. You also may have a sour taste in your mouth. These symptoms are commonly known as heartburn or reflux. But not all hiatal hernias cause symptoms. Follow-up care is a key part of your treatment and safety. Be sure to make and go to all appointments, and call your doctor if you are having problems. It's also a good idea to know your test results and keep a list of the medicines you take. How can you care for yourself at home? · Take your medicines exactly as prescribed. Call your doctor if you think you are having a problem with your medicine. · Do not take aspirin or other nonsteroidal anti-inflammatory drugs (NSAIDs), such as ibuprofen (Advil, Motrin) or naproxen (Aleve), unless your doctor says it is okay. Ask your doctor what you can take for pain. · Your doctor may recommend over-the-counter medicine. For mild or occasional indigestion, antacids such as Tums, Gaviscon, Maalox, or Mylanta may help. Your doctor also may recommend over-the-counter acid reducers, such as famotidine (Pepcid AC), cimetidine (Tagamet HB), or omeprazole (Prilosec). Read and follow all instructions on the label. If you use these medicines often, talk with your doctor. · Change your eating habits. ? It's best to eat several small meals instead of two or three large meals. ? After you eat, wait 2 to 3 hours before you lie down. Late-night snacks aren't a good idea. ? Chocolate, mint, and alcohol can make heartburn worse. They relax the valve between the esophagus and the stomach. ? Spicy foods, foods that have a lot of acid (like tomatoes and oranges), and coffee can make heartburn symptoms worse in some people. If your symptoms are worse after you eat a certain food, you may want to stop eating that food to see if your symptoms get better. · Do not smoke or chew tobacco. 
· If you get heartburn at night, raise the head of your bed 6 to 8 inches by putting the frame on blocks or placing a foam wedge under the head of your mattress. (Adding extra pillows does not work.) · Do not wear tight clothing around your middle. · Lose weight if you need to. Losing just 5 to 10 pounds can help. When should you call for help? Call your doctor now or seek immediate medical care if: 
  · You have new or worse belly pain.  
  · You are vomiting. Watch closely for changes in your health, and be sure to contact your doctor if: 
  · You have new or worse symptoms of indigestion.  
  · You have trouble or pain swallowing.  
  · You are losing weight.  
  · You do not get better as expected. Where can you learn more? Go to http://www.gray.com/ Enter X919 in the search box to learn more about \"Hiatal Hernia: Care Instructions. \" Current as of: April 15, 2020               Content Version: 12.6 © 0585-4257 [employer]. Care instructions adapted under license by [employer] (which disclaims liability or warranty for this information). If you have questions about a medical condition or this instruction, always ask your healthcare professional. [place] [address] any warranty or liability for your use of this information.

## 2020-10-07 ENCOUNTER — HOSPITAL ENCOUNTER (OUTPATIENT)
Dept: MAMMOGRAPHY | Age: 75
Discharge: HOME | End: 2020-10-07
Attending: NURSE PRACTITIONER
Payer: MEDICARE

## 2020-10-07 DIAGNOSIS — Z12.31 VISIT FOR SCREENING MAMMOGRAM: ICD-10-CM

## 2020-10-07 LAB
25(OH)D3+25(OH)D2 SERPL-MCNC: 63.5 NG/ML (ref 30–100)
ALBUMIN SERPL-MCNC: 4.1 G/DL (ref 3.7–4.7)
ALBUMIN/GLOB SERPL: 1.5 {RATIO} (ref 1.2–2.2)
ALP SERPL-CCNC: 49 IU/L (ref 39–117)
ALT SERPL-CCNC: 9 IU/L (ref 0–32)
AST SERPL-CCNC: 14 IU/L (ref 0–40)
BILIRUB SERPL-MCNC: 0.4 MG/DL (ref 0–1.2)
BUN SERPL-MCNC: 19 MG/DL (ref 8–27)
BUN/CREAT SERPL: 19 (ref 12–28)
CALCIUM SERPL-MCNC: 9.7 MG/DL (ref 8.7–10.3)
CHLORIDE SERPL-SCNC: 102 MMOL/L (ref 96–106)
CHOLEST SERPL-MCNC: 189 MG/DL (ref 100–199)
CO2 SERPL-SCNC: 27 MMOL/L (ref 20–29)
CREAT SERPL-MCNC: 0.98 MG/DL (ref 0.57–1)
GLOBULIN SER CALC-MCNC: 2.7 G/DL (ref 1.5–4.5)
GLUCOSE SERPL-MCNC: 83 MG/DL (ref 65–99)
HDLC SERPL-MCNC: 57 MG/DL
LDLC SERPL CALC-MCNC: 122 MG/DL (ref 0–99)
POTASSIUM SERPL-SCNC: 4.6 MMOL/L (ref 3.5–5.2)
PROT SERPL-MCNC: 6.8 G/DL (ref 6–8.5)
SODIUM SERPL-SCNC: 140 MMOL/L (ref 134–144)
TRIGL SERPL-MCNC: 53 MG/DL (ref 0–149)
VLDLC SERPL CALC-MCNC: 10 MG/DL (ref 5–40)

## 2020-10-07 PROCEDURE — 77063 BREAST TOMOSYNTHESIS BI: CPT

## 2020-11-03 RX ORDER — HYDROCHLOROTHIAZIDE 12.5 MG/1
TABLET ORAL
Qty: 90 TAB | Refills: 1 | Status: SHIPPED | OUTPATIENT
Start: 2020-11-03 | End: 2021-04-22 | Stop reason: ALTCHOICE

## 2021-01-06 NOTE — TELEPHONE ENCOUNTER
Rafal Barth is requesting a 90 day supply  Previous Rx was sent to [facility]    Last Visit: 10/6/20 with NP Hayde Catherine  Next Appointment: Advised to follow-up in 4 months  Previous Refill Encounter(s): 9/1/20 #90 with 1 refill    Requested Prescriptions     Pending Prescriptions Disp Refills    simvastatin (ZOCOR) 10 mg tablet 90 Tab 1     Sig: Take 1 Tab by mouth nightly.

## 2021-01-10 RX ORDER — SIMVASTATIN 10 MG/1
10 TABLET, FILM COATED ORAL
Qty: 90 TAB | Refills: 1 | Status: SHIPPED | OUTPATIENT
Start: 2021-01-10 | End: 2021-08-27

## 2021-03-24 ENCOUNTER — APPOINTMENT (OUTPATIENT)
Dept: FAMILY MEDICINE CLINIC | Age: 76
End: 2021-03-24

## 2021-03-24 ENCOUNTER — VIRTUAL VISIT (OUTPATIENT)
Dept: FAMILY MEDICINE CLINIC | Age: 76
End: 2021-03-24
Payer: MEDICARE

## 2021-03-24 DIAGNOSIS — I10 ESSENTIAL HYPERTENSION, BENIGN: ICD-10-CM

## 2021-03-24 DIAGNOSIS — E87.6 HYPOKALEMIA: Primary | ICD-10-CM

## 2021-03-24 DIAGNOSIS — E87.6 HYPOKALEMIA: ICD-10-CM

## 2021-03-24 PROCEDURE — 1090F PRES/ABSN URINE INCON ASSESS: CPT | Performed by: NURSE PRACTITIONER

## 2021-03-24 PROCEDURE — 1101F PT FALLS ASSESS-DOCD LE1/YR: CPT | Performed by: NURSE PRACTITIONER

## 2021-03-24 PROCEDURE — 99214 OFFICE O/P EST MOD 30 MIN: CPT | Performed by: NURSE PRACTITIONER

## 2021-03-24 PROCEDURE — G9899 SCRN MAM PERF RSLTS DOC: HCPCS | Performed by: NURSE PRACTITIONER

## 2021-03-24 PROCEDURE — 3017F COLORECTAL CA SCREEN DOC REV: CPT | Performed by: NURSE PRACTITIONER

## 2021-03-24 PROCEDURE — G8756 NO BP MEASURE DOC: HCPCS | Performed by: NURSE PRACTITIONER

## 2021-03-24 PROCEDURE — G8432 DEP SCR NOT DOC, RNG: HCPCS | Performed by: NURSE PRACTITIONER

## 2021-03-24 PROCEDURE — G8399 PT W/DXA RESULTS DOCUMENT: HCPCS | Performed by: NURSE PRACTITIONER

## 2021-03-24 PROCEDURE — G8428 CUR MEDS NOT DOCUMENT: HCPCS | Performed by: NURSE PRACTITIONER

## 2021-03-24 RX ORDER — ATENOLOL 50 MG/1
50 TABLET ORAL 2 TIMES DAILY
Qty: 60 TAB | Refills: 2 | Status: SHIPPED | OUTPATIENT
Start: 2021-03-24 | End: 2021-04-22 | Stop reason: ALTCHOICE

## 2021-03-24 NOTE — PROGRESS NOTES
Ezequiel Downs is a 70 y.o. female who was seen by synchronous (real-time) audio-video technology on 3/24/2021 for ED Follow-up      Assessment & Plan:   Diagnoses and all orders for this visit:    1. Hypokalemia  -     METABOLIC PANEL, BASIC; Future    2. Essential hypertension, benign  -     METABOLIC PANEL, BASIC; Future  -     LIPID PANEL; Future    Other orders  -     atenoloL (TENORMIN) 50 mg tablet; Take 1 Tab by mouth two (2) times a day. Follow-up and Dispositions    · Return in about 4 weeks (around 4/21/2021) for HTN, in office follow up. Routing History        I spent at least 30 minutes on this visit with this established patient. 712  Subjective:   Patient states she was seen in the ED on 3/9/2021 for elevated blood pressure. After she was  History of Present Illness   This is a 80 y. o. female who has a history of hypertension and hypercholesterolemia.  She states for the last 24 hours or more she has had a discomfort on the left side of her chest that is been intermittent.  It is not sharp or dull.  She denies it as a pressure.  She states that it feels better if she belches so she thought it would was likely to be gas.  Her blood pressure has been elevated over the same time.  Her pressures remained elevated throughout the day today and this evening she became concerned when it was still 190/100.  She called her sister who recommended that she come to the hospital.  The patient admits to being under quite a bit of social stressors over the last year. Vasquez Sullivan denies a history of coronary disease.  Denies any new shortness of breath.  Denies any unusual swelling or pain in her legs. Tang Tamia is been no nausea or vomiting associated with this.     Patient had her first Covid vaccine about 2 weeks ago.  Afterwards she had some mild headache and lightheadedness and some arm soreness however that resolved within a few days.     Patient states she was unable to get in with our office for a sooner follow up and followed up with [facility] on 3/16/2021. States her potassium was still decreased (K+ 2.8) and prescribed Potassium 10mEq. Comments after taking the medication she felt dizzy which caused her to feel anxious and she called the paramedics given she was home by herself. States HCTZ was discontinued and prescribed losartan which she has not taken. Comments she wanted to speak with PCP prior to beginning medication. Patient further reports she was previously prescribed Atenolol which worked well for her and also helped with her anxiety. Medication was changed in 2017 to metoprolol after being on back order. Patient would like to resume taking Atenolol. Prior to Admission medications    Medication Sig Start Date End Date Taking? Authorizing Provider   simvastatin (ZOCOR) 10 mg tablet Take 1 Tab by mouth nightly. 1/10/21   Kwaku DOWELL NP   hydroCHLOROthiazide (HYDRODIURIL) 12.5 mg tablet TAKE 1 TABLET EVERY DAY 11/3/20   Kwaku DOWELL NP   metoprolol succinate (TOPROL-XL) 100 mg tablet TAKE 1 TABLET EVERY DAY 8/5/20   Kwaku DOWELL NP   omeprazole (PRILOSEC) 20 mg capsule Take 1 Cap by mouth two (2) times a day. 7/3/19   Kwaku DOWELL NP   aspirin delayed-release 81 mg tablet Take 81 mg by mouth every other day. Does not take daily    Provider, Historical   omega-3 fatty acids-vitamin e (FISH OIL) 1,000 mg Cap Take 1 Cap by mouth daily. 4/6/10   Provider, Historical   Cholecalciferol, Vitamin D3, (VITAMIN D) 2,000 unit Cap Take  by mouth daily.  8/11/10   Provider, Historical     Patient Active Problem List   Diagnosis Code    Essential hypertension, benign I10    Pure hypercholesterolemia E78.00    GERD (gastroesophageal reflux disease) K21.9    Vitamin D deficiency E55.9    PND (paroxysmal nocturnal dyspnea) R06.00    Pain in heel M79.673    Situational stress F43.9    Influenza vaccination declined Z28.21    23-polyvalent pneumococcal polysaccharide vaccine declined Z28.21    Overweight (BMI 25.0-29. 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    Osteopenia of multiple sites M85.89     Patient Active Problem List    Diagnosis Date Noted    Osteopenia of multiple sites 01/24/2018    Acquired absence of both cervix and uterus 01/25/2017    Overweight (BMI 25.0-29.9) 10/24/2016    Influenza vaccination declined 04/25/2016    23-polyvalent pneumococcal polysaccharide vaccine declined 04/25/2016    Situational stress 04/11/2016    Pain in heel 11/24/2014    PND (paroxysmal nocturnal dyspnea)     Vitamin D deficiency 03/11/2011    Essential hypertension, benign 04/06/2010    Pure hypercholesterolemia 04/06/2010    GERD (gastroesophageal reflux disease) 04/06/2010     Current Outpatient Medications   Medication Sig Dispense Refill    simvastatin (ZOCOR) 10 mg tablet Take 1 Tab by mouth nightly. 90 Tab 1    hydroCHLOROthiazide (HYDRODIURIL) 12.5 mg tablet TAKE 1 TABLET EVERY DAY 90 Tab 1    metoprolol succinate (TOPROL-XL) 100 mg tablet TAKE 1 TABLET EVERY DAY 90 Tab 1    omeprazole (PRILOSEC) 20 mg capsule Take 1 Cap by mouth two (2) times a day. 60 Cap 0    aspirin delayed-release 81 mg tablet Take 81 mg by mouth every other day. Does not take daily      omega-3 fatty acids-vitamin e (FISH OIL) 1,000 mg Cap Take 1 Cap by mouth daily.  Cholecalciferol, Vitamin D3, (VITAMIN D) 2,000 unit Cap Take  by mouth daily.        Allergies   Allergen Reactions    Naproxen Palpitations     Past Medical History:   Diagnosis Date    Fibrocystic disease of breast     GERD (gastroesophageal reflux disease)     Hx of dizziness     Hypercholesterolemia     Hypertension     PND (paroxysmal nocturnal dyspnea)     Evaluated by Dr. Han Sevilla (March 2012)    Vaginitis atrophic 7-29-09    Vitamin D deficiency 4-2009     Past Surgical History:   Procedure Laterality Date    HX HYSTERECTOMY  1960's    MI COLONOSCOPY FLX DX W/COLLJ SPEC WHEN PFRMD  2004    normal ;  Radha Perez     Family History   Problem Relation Age of Onset    Hypertension Mother     Arthritis-osteo Mother     Heart Attack Father     High Cholesterol Father     Hypertension Sister     High Cholesterol Sister     Breast Cancer Sister     Hypertension Brother      Social History     Tobacco Use    Smoking status: Former Smoker     Packs/day: 0.50     Years: 15.00     Pack years: 7.50     Quit date: 1979     Years since quittin.3    Smokeless tobacco: Never Used   Substance Use Topics    Alcohol use: No       ROS    Objective:   No flowsheet data found. General: alert, cooperative, no distress   Mental  status: normal mood, behavior, speech, dress, motor activity, and thought processes, able to follow commands   HENT: NCAT   Neck: no visualized mass   Resp: no respiratory distress   Neuro: no gross deficits   Skin: no discoloration or lesions of concern on visible areas   Psychiatric: normal affect, consistent with stated mood, no evidence of hallucinations     Additional exam findings: We discussed the expected course, resolution and complications of the diagnosis(es) in detail. Medication risks, benefits, costs, interactions, and alternatives were discussed as indicated. I advised her to contact the office if her condition worsens, changes or fails to improve as anticipated. She expressed understanding with the diagnosis(es) and plan. Ezequiel Downs, was evaluated through a synchronous (real-time) audio-video encounter. The patient (or guardian if applicable) is aware that this is a billable service. Verbal consent to proceed has been obtained within the past 12 months. The visit was conducted pursuant to the emergency declaration under the [address] [place] [address] authority and the [employer] General Act. Patient identification was verified, and a caregiver was present when appropriate.  The patient was located in a state where the provider was credentialed to provide care.     Denia Mace, NP

## 2021-03-25 LAB
BUN SERPL-MCNC: 12 MG/DL (ref 8–27)
BUN/CREAT SERPL: 11 (ref 12–28)
CALCIUM SERPL-MCNC: 9.6 MG/DL (ref 8.7–10.3)
CHLORIDE SERPL-SCNC: 105 MMOL/L (ref 96–106)
CHOLEST SERPL-MCNC: 158 MG/DL (ref 100–199)
CO2 SERPL-SCNC: 25 MMOL/L (ref 20–29)
CREAT SERPL-MCNC: 1.13 MG/DL (ref 0.57–1)
GLUCOSE SERPL-MCNC: 85 MG/DL (ref 65–99)
HDLC SERPL-MCNC: 50 MG/DL
LDLC SERPL CALC-MCNC: 96 MG/DL (ref 0–99)
POTASSIUM SERPL-SCNC: 5 MMOL/L (ref 3.5–5.2)
SODIUM SERPL-SCNC: 142 MMOL/L (ref 134–144)
TRIGL SERPL-MCNC: 57 MG/DL (ref 0–149)
VLDLC SERPL CALC-MCNC: 12 MG/DL (ref 5–40)

## 2021-04-22 ENCOUNTER — VIRTUAL VISIT (OUTPATIENT)
Dept: FAMILY MEDICINE CLINIC | Age: 76
End: 2021-04-22
Payer: MEDICARE

## 2021-04-22 DIAGNOSIS — I10 ESSENTIAL HYPERTENSION, BENIGN: Primary | ICD-10-CM

## 2021-04-22 PROCEDURE — 99443 PR PHYS/QHP TELEPHONE EVALUATION 21-30 MIN: CPT | Performed by: NURSE PRACTITIONER

## 2021-04-22 RX ORDER — CHLORTHALIDONE 25 MG/1
25 TABLET ORAL DAILY
COMMUNITY
Start: 2021-04-21 | End: 2021-05-13 | Stop reason: SDUPTHER

## 2021-04-22 RX ORDER — METOPROLOL SUCCINATE 100 MG/1
100 TABLET, EXTENDED RELEASE ORAL DAILY
Qty: 90 TAB | Refills: 1
Start: 2021-04-22 | End: 2021-08-27

## 2021-05-13 ENCOUNTER — OFFICE VISIT (OUTPATIENT)
Dept: FAMILY MEDICINE CLINIC | Age: 76
End: 2021-05-13
Payer: MEDICARE

## 2021-05-13 VITALS
HEIGHT: 63 IN | RESPIRATION RATE: 20 BRPM | WEIGHT: 145 LBS | SYSTOLIC BLOOD PRESSURE: 136 MMHG | BODY MASS INDEX: 25.69 KG/M2 | DIASTOLIC BLOOD PRESSURE: 84 MMHG | TEMPERATURE: 97.4 F | HEART RATE: 77 BPM

## 2021-05-13 DIAGNOSIS — I10 ESSENTIAL HYPERTENSION, BENIGN: Primary | ICD-10-CM

## 2021-05-13 DIAGNOSIS — E87.6 HYPOKALEMIA: ICD-10-CM

## 2021-05-13 PROCEDURE — G8399 PT W/DXA RESULTS DOCUMENT: HCPCS | Performed by: NURSE PRACTITIONER

## 2021-05-13 PROCEDURE — 1090F PRES/ABSN URINE INCON ASSESS: CPT | Performed by: NURSE PRACTITIONER

## 2021-05-13 PROCEDURE — G9899 SCRN MAM PERF RSLTS DOC: HCPCS | Performed by: NURSE PRACTITIONER

## 2021-05-13 PROCEDURE — 3017F COLORECTAL CA SCREEN DOC REV: CPT | Performed by: NURSE PRACTITIONER

## 2021-05-13 PROCEDURE — 99214 OFFICE O/P EST MOD 30 MIN: CPT | Performed by: NURSE PRACTITIONER

## 2021-05-13 PROCEDURE — G8754 DIAS BP LESS 90: HCPCS | Performed by: NURSE PRACTITIONER

## 2021-05-13 PROCEDURE — 1101F PT FALLS ASSESS-DOCD LE1/YR: CPT | Performed by: NURSE PRACTITIONER

## 2021-05-13 PROCEDURE — G8419 CALC BMI OUT NRM PARAM NOF/U: HCPCS | Performed by: NURSE PRACTITIONER

## 2021-05-13 PROCEDURE — G8536 NO DOC ELDER MAL SCRN: HCPCS | Performed by: NURSE PRACTITIONER

## 2021-05-13 PROCEDURE — G8427 DOCREV CUR MEDS BY ELIG CLIN: HCPCS | Performed by: NURSE PRACTITIONER

## 2021-05-13 PROCEDURE — G8432 DEP SCR NOT DOC, RNG: HCPCS | Performed by: NURSE PRACTITIONER

## 2021-05-13 PROCEDURE — G8752 SYS BP LESS 140: HCPCS | Performed by: NURSE PRACTITIONER

## 2021-05-13 RX ORDER — CHLORTHALIDONE 25 MG/1
25 TABLET ORAL DAILY
Qty: 90 TAB | Refills: 3 | Status: SHIPPED | OUTPATIENT
Start: 2021-05-13 | End: 2022-03-28

## 2021-05-13 NOTE — PROGRESS NOTES
Yoav Reno is a 70 y.o. female who was seen in clinic today (5/13/2021) for Hypertension      Assessment & Plan:   Diagnoses and all orders for this visit:    1. Essential hypertension, benign  -     chlorthalidone (HYGROTON) 25 mg tablet; Take 1 Tab by mouth daily.  -     METABOLIC PANEL, BASIC; Future    2. Hypokalemia  -     METABOLIC PANEL, BASIC; Future    Other orders  -     METABOLIC PANEL, BASIC      I have discussed the diagnosis with the patient and the intended plan as seen in the above orders. The patient has received an after-visit summary and questions were answered concerning future plans. I have discussed medication side effects and warnings with the patient as well. Patient agreeable with above plan and verbalizes understanding. Follow-up and Dispositions    · Return in about 4 months (around 9/13/2021) for HTN/HLD, in office follow up, fasting labs 1 wk prior. Subjective:   Hypertension ROS: taking medications as instructed, no medication side effects noted, no TIA's, no chest pain on exertion, no dyspnea on exertion, no swelling of ankles. States she has not been monitoring her blood pressure at home due to feeling anxious if it was elevated. Comments she returns to the cardiologist next month. States she has been doing well since beginning chlorthalidone. Lab Results   Component Value Date/Time    Sodium 142 03/24/2021 12:00 AM    Potassium 5.0 03/24/2021 12:00 AM    Chloride 105 03/24/2021 12:00 AM    CO2 25 03/24/2021 12:00 AM    Anion gap 5 03/09/2021 11:03 PM    Glucose 85 03/24/2021 12:00 AM    BUN 12 03/24/2021 12:00 AM    Creatinine 1.13 (H) 03/24/2021 12:00 AM    BUN/Creatinine ratio 11 (L) 03/24/2021 12:00 AM    GFR est AA 56 (L) 03/24/2021 12:00 AM    GFR est non-AA 48 (L) 03/24/2021 12:00 AM    Calcium 9.6 03/24/2021 12:00 AM    Bilirubin, total 0.4 10/06/2020 12:00 AM    Alk.  phosphatase 49 10/06/2020 12:00 AM    Protein, total 6.8 10/06/2020 12:00 AM    Albumin 4.1 10/06/2020 12:00 AM    Globulin 3.4 10/18/2017 08:17 AM    A-G Ratio 1.5 10/06/2020 12:00 AM    ALT (SGPT) 9 10/06/2020 12:00 AM    AST (SGOT) 14 10/06/2020 12:00 AM     Lab Results   Component Value Date/Time    Cholesterol, total 158 03/24/2021 12:00 AM    HDL Cholesterol 50 03/24/2021 12:00 AM    LDL, calculated 96 03/24/2021 12:00 AM    LDL, calculated 143 (H) 06/23/2020 08:14 AM    VLDL, calculated 12 03/24/2021 12:00 AM    VLDL, calculated 12 06/23/2020 08:14 AM    Triglyceride 57 03/24/2021 12:00 AM    CHOL/HDL Ratio 3.3 04/25/2018 07:42 AM     No results found for: HBA1C, HGBE8, MUJ1ZATM, XQZ4BWCP  Lab Results   Component Value Date/Time    Vitamin D 25-Hydroxy 55.1 10/18/2017 08:17 AM    VITAMIN D, 25-HYDROXY 63.5 10/06/2020 12:00 AM       Lab Results   Component Value Date/Time    WBC 7.7 03/09/2021 11:03 PM    HGB 13.2 03/09/2021 11:03 PM    HCT 42.3 03/09/2021 11:03 PM    PLATELET 566 84/30/6783 11:03 PM    MCV 82.1 03/09/2021 11:03 PM       Wt Readings from Last 3 Encounters:   05/13/21 145 lb (65.8 kg)   03/09/21 157 lb (71.2 kg)   10/06/20 153 lb (69.4 kg)     Temp Readings from Last 3 Encounters:   05/13/21 97.4 °F (36.3 °C) (Temporal)   03/10/21 98.4 °F (36.9 °C)   10/06/20 97 °F (36.1 °C) (Temporal)     BP Readings from Last 3 Encounters:   05/13/21 136/84   03/10/21 131/78   10/06/20 120/76     Pulse Readings from Last 3 Encounters:   05/13/21 77   03/10/21 74   10/06/20 64       Prior to Admission medications    Medication Sig Start Date End Date Taking? Authorizing Provider   chlorthalidone (HYGROTON) 25 mg tablet Take 1 Tab by mouth daily. 5/13/21  Yes Rebeca Smith NP   metoprolol succinate (TOPROL-XL) 100 mg tablet Take 1 Tab by mouth daily. 4/22/21  Yes Rebeca Smith NP   simvastatin (ZOCOR) 10 mg tablet Take 1 Tab by mouth nightly. 1/10/21  Yes Rebeca Smith NP   omeprazole (PRILOSEC) 20 mg capsule Take 1 Cap by mouth two (2) times a day.  7/3/19  Yes Rebeca Smith NP   aspirin delayed-release 81 mg tablet Take 81 mg by mouth every other day. Does not take daily   Yes Provider, Historical   omega-3 fatty acids-vitamin e (FISH OIL) 1,000 mg Cap Take 1 Cap by mouth daily. 4/6/10  Yes Provider, Historical   Cholecalciferol, Vitamin D3, (VITAMIN D) 2,000 unit Cap Take  by mouth daily. 8/11/10  Yes Provider, Historical   chlorthalidone (HYGROTON) 25 mg tablet Take 25 mg by mouth daily. 4/21/21 5/13/21  Provider, Historical         The following sections were reviewed & updated as appropriate: PMH, PSH, FH, and SH. Review of Systems   Constitutional: Negative for activity change, appetite change, chills, fatigue and fever. Respiratory: Negative for chest tightness and shortness of breath. Cardiovascular: Negative for chest pain. Neurological: Negative for dizziness and headaches. Objective:     Visit Vitals  /84 (BP 1 Location: Left arm, BP Patient Position: Sitting, BP Cuff Size: Adult)   Pulse 77   Temp 97.4 °F (36.3 °C) (Temporal)   Resp 20   Ht 5' 3\" (1.6 m)   Wt 145 lb (65.8 kg)   BMI 25.69 kg/m²      Physical Exam  Constitutional:       General: She is not in acute distress. Appearance: She is well-developed. HENT:      Head: Normocephalic and atraumatic. Neck:      Vascular: No carotid bruit. Cardiovascular:      Rate and Rhythm: Normal rate and regular rhythm. Pulses: Normal pulses. Heart sounds: Normal heart sounds. No murmur heard. No friction rub. No gallop. Pulmonary:      Effort: Pulmonary effort is normal.      Breath sounds: Normal breath sounds. No decreased breath sounds, wheezing, rhonchi or rales. Abdominal:      General: Bowel sounds are normal.      Palpations: Abdomen is soft. Tenderness: There is no abdominal tenderness. Musculoskeletal:      Cervical back: Normal range of motion and neck supple. Right lower leg: No edema. Left lower leg: No edema. Lymphadenopathy:      Cervical: No cervical adenopathy. Skin:     General: Skin is warm and dry. Neurological:      Mental Status: She is alert and oriented to person, place, and time. Disclaimer: The patient understands our medical plan. Alternatives have been explained and offered. The risks, benefits and significant side effects of all medications have been reviewed. Anticipated time course and progression of condition reviewed. All questions have been addressed. She is encouraged to employ the information provided in the after visit summary, which was reviewed. Where applicable, she is instructed to call the clinic if she has not been notified either by phone or through [address] with the results of her tests or with an appointment plan for any referrals within 1 week(s). No news is not good news; it's no news. The patient  is to call if her condition worsens or fails to improve or if significant side effects are experienced. Aspects of this note may have been generated using voice recognition software. Despite editing, there may be unrecognized errors.        Charley Guzman NP

## 2021-05-13 NOTE — PATIENT INSTRUCTIONS
Learning About High-Potassium Foods What foods are high in potassium? The foods you eat contain nutrients, such as vitamins and minerals. Potassium is a nutrient. Your body needs the right amount to stay healthy and work as it should. You can use the list below to help you make choices about which foods to eat. Foods are high in potassium if they have more than 200 mg per serving. Fruits · Apricots, 2 raw · Avocado, ½ fruit · Banana, 1 medium · [place], 1 fruit · Nectarine, 1 fruit · Orange, 1 fruit · Prunes, 5 fruits · Raisins, ¼ cup Vegetables · Artichoke, 1 medium · Beets, ½ cup · Broccoli, ½ cup · Kale (raw), 1 cup · Potato with skin, 1 medium · Spinach, ½ cup · Sweet potato, 1 medium · Tomato sauce, ½ cup · Zucchini, ½ cup Dairy and dairy alternatives · Milk, 1 cup · Soy milk, 1 cup · Yogurt, 6 oz Meats and other protein foods · Beans (lima, navy, white), ½ cup · Beef, ground, 3 oz · Chicken, 3 oz · Fish (halibut, tuna, cod, snapper), 3 oz · Nuts (almonds, hazelnuts, [place], cashew, pistachios), 1 oz · Peanut butter, 2 Tbsp · Peanuts, 1 oz · [place] 3 oz Seasonings · Salt substitutes Work with your doctor to find out how much of this nutrient you need. Depending on your health, you may need more or less of it in your diet. Where can you learn more? Go to http://www.gray.com/ Enter P450 in the search box to learn more about \"Learning About High-Potassium Foods. \" Current as of: December 17, 2020               Content Version: 12.8 © 1985-1230 [employer]. Care instructions adapted under license by [employer] (which disclaims liability or warranty for this information). If you have questions about a medical condition or this instruction, always ask your healthcare professional. [place] [address] any warranty or liability for your use of this information.  
 
 
  
Potassium-Rich Diet: Care Instructions Your Care Instructions Potassium is a mineral. It helps keep the right mix of fluids in your body. It also helps your nerves and muscles work as they should. You'll find it in milk and meats. It's also in all fresh foods, including fruits and vegetables. Most adults need about 5 grams of potassium a day. The foods you eat should supply all that you need. Some health conditions can cause a loss of potassium. For example, kidney problems and stomach problems with vomiting and diarrhea can cause you to lose this mineral. Some medicines, such as water pills (diuretics), can cause low potassium. If you can't get enough potassium from what you eat, your doctor may advise you to take supplements. Follow-up care is a key part of your treatment and safety. Be sure to make and go to all appointments, and call your doctor if you are having problems. It's also a good idea to know your test results and keep a list of the medicines you take. How can you care for yourself at home? · Plan your diet around foods that are rich in potassium. Fresh, unprocessed whole foods have the most. These foods include: ? Milk and other dairy products. ? Vegetables, especially broccoli, cooked dry beans, tomatoes, potatoes, artichokes, winter squash, and spinach. ? Fruits, especially citrus fruits, bananas, and apricots. Dried apricots contain more potassium than fresh apricots. ? Meat, poultry, and fish. · Ask your doctor about using a salt substitute or \"light\" salt. These often contain potassium. Where can you learn more? Go to http://www.gray.com/ Enter H315 in the search box to learn more about \"Potassium-Rich Diet: Care Instructions. \" Current as of: December 17, 2020               Content Version: 12.8 © 1908-0415 [employer]. Care instructions adapted under license by [employer] (which disclaims liability or warranty for this information).  If you have questions about a medical condition or this instruction, always ask your healthcare professional. [place] [address] any warranty or liability for your use of this information.

## 2021-05-14 LAB
BUN SERPL-MCNC: 19 MG/DL (ref 8–27)
BUN/CREAT SERPL: 19 (ref 12–28)
CALCIUM SERPL-MCNC: 9.8 MG/DL (ref 8.7–10.3)
CHLORIDE SERPL-SCNC: 97 MMOL/L (ref 96–106)
CO2 SERPL-SCNC: 29 MMOL/L (ref 20–29)
CREAT SERPL-MCNC: 1.02 MG/DL (ref 0.57–1)
GLUCOSE SERPL-MCNC: 115 MG/DL (ref 65–99)
POTASSIUM SERPL-SCNC: 4.1 MMOL/L (ref 3.5–5.2)
SODIUM SERPL-SCNC: 139 MMOL/L (ref 134–144)

## 2021-09-21 ENCOUNTER — OFFICE VISIT (OUTPATIENT)
Dept: FAMILY MEDICINE CLINIC | Age: 76
End: 2021-09-21
Payer: MEDICARE

## 2021-09-21 VITALS
TEMPERATURE: 97.1 F | HEART RATE: 73 BPM | BODY MASS INDEX: 25.52 KG/M2 | OXYGEN SATURATION: 100 % | SYSTOLIC BLOOD PRESSURE: 125 MMHG | HEIGHT: 63 IN | RESPIRATION RATE: 20 BRPM | WEIGHT: 144 LBS | DIASTOLIC BLOOD PRESSURE: 72 MMHG

## 2021-09-21 DIAGNOSIS — E78.00 PURE HYPERCHOLESTEROLEMIA: ICD-10-CM

## 2021-09-21 DIAGNOSIS — Z00.00 MEDICARE ANNUAL WELLNESS VISIT, SUBSEQUENT: Primary | ICD-10-CM

## 2021-09-21 DIAGNOSIS — M25.562 ACUTE PAIN OF LEFT KNEE: ICD-10-CM

## 2021-09-21 DIAGNOSIS — I10 ESSENTIAL HYPERTENSION, BENIGN: ICD-10-CM

## 2021-09-21 PROCEDURE — G8752 SYS BP LESS 140: HCPCS | Performed by: NURSE PRACTITIONER

## 2021-09-21 PROCEDURE — G8432 DEP SCR NOT DOC, RNG: HCPCS | Performed by: NURSE PRACTITIONER

## 2021-09-21 PROCEDURE — G0439 PPPS, SUBSEQ VISIT: HCPCS | Performed by: NURSE PRACTITIONER

## 2021-09-21 PROCEDURE — G8536 NO DOC ELDER MAL SCRN: HCPCS | Performed by: NURSE PRACTITIONER

## 2021-09-21 PROCEDURE — G9899 SCRN MAM PERF RSLTS DOC: HCPCS | Performed by: NURSE PRACTITIONER

## 2021-09-21 PROCEDURE — 1101F PT FALLS ASSESS-DOCD LE1/YR: CPT | Performed by: NURSE PRACTITIONER

## 2021-09-21 PROCEDURE — G8419 CALC BMI OUT NRM PARAM NOF/U: HCPCS | Performed by: NURSE PRACTITIONER

## 2021-09-21 PROCEDURE — G8754 DIAS BP LESS 90: HCPCS | Performed by: NURSE PRACTITIONER

## 2021-09-21 PROCEDURE — G8427 DOCREV CUR MEDS BY ELIG CLIN: HCPCS | Performed by: NURSE PRACTITIONER

## 2021-09-21 PROCEDURE — 3017F COLORECTAL CA SCREEN DOC REV: CPT | Performed by: NURSE PRACTITIONER

## 2021-09-21 PROCEDURE — G8399 PT W/DXA RESULTS DOCUMENT: HCPCS | Performed by: NURSE PRACTITIONER

## 2021-09-21 RX ORDER — DICLOFENAC SODIUM 10 MG/G
2 GEL TOPICAL 4 TIMES DAILY
Qty: 100 G | Refills: 0 | Status: SHIPPED | OUTPATIENT
Start: 2021-09-21

## 2021-09-21 NOTE — PROGRESS NOTES
This is the Subsequent Medicare Annual Wellness Exam, performed 12 months or more after the Initial AWV or the last Subsequent AWV    I have reviewed the patient's medical history in detail and updated the computerized patient record. Assessment/Plan   Education and counseling provided:  Are appropriate based on today's review and evaluation    1. Medicare annual wellness visit, subsequent  2. Essential hypertension, benign  -     LIPID PANEL; Future  -     METABOLIC PANEL, COMPREHENSIVE; Future  3. Pure hypercholesterolemia  -     LIPID PANEL; Future  -     METABOLIC PANEL, COMPREHENSIVE; Future  4. Acute pain of left knee    I have discussed the diagnosis with the patient and the intended plan as seen in the above orders. The patient has received an after-visit summary and questions were answered concerning future plans. I have discussed medication side effects and warnings with the patient as well. Patient agreeable with above plan and verbalizes understanding. Follow-up and Dispositions    · Return in about 4 months (around 1/21/2022) for HTN/HLD, in office follow up, fasting labs 1 wk prior.        Depression Risk Factor Screening     3 most recent PHQ Screens 9/21/2021   PHQ Not Done Patient refuses   Little interest or pleasure in doing things Not at all   Feeling down, depressed, irritable, or hopeless Not at all   Total Score PHQ 2 0   Trouble falling or staying asleep, or sleeping too much -   Feeling tired or having little energy -   Poor appetite, weight loss, or overeating -   Feeling bad about yourself - or that you are a failure or have let yourself or your family down -   Trouble concentrating on things such as school, work, reading, or watching TV -   Moving or speaking so slowly that other people could have noticed; or the opposite being so fidgety that others notice -   Thoughts of being better off dead, or hurting yourself in some way -   PHQ 9 Score -   How difficult have these problems made it for you to do your work, take care of your home and get along with others -       Alcohol Risk Screen    Do you average more than 1 drink per night or more than 7 drinks a week:  No    On any one occasion in the past three months have you have had more than 3 drinks containing alcohol:  No        Functional Ability and Level of Safety    Hearing: Hearing is good. Activities of Daily Living: The home contains: no safety equipment. Patient does total self care      Ambulation: with no difficulty     Fall Risk:  Fall Risk Assessment, last 12 mths 9/21/2021   Able to walk? Yes   Fall in past 12 months? 1   Do you feel unsteady? 0   Are you worried about falling 0   Is the gait abnormal? 0   Number of falls in past 12 months 1   Fall with injury? 0      Abuse Screen:  Patient is not abused       Cognitive Screening    Has your family/caregiver stated any concerns about your memory: no     Health Maintenance Due     Health Maintenance Due   Topic Date Due    Shingrix Vaccine Age 49> (1 of 2) Never done    Pneumococcal 65+ years (1 of 1 - PPSV23) Never done    Breast Cancer Screen Mammogram  10/07/2021     Patient Care Team   Patient Care Team:  Bri Cervantes NP as PCP - General (Nurse Practitioner)  Bri Cervantes NP as PCP - [facility] Empaneled Provider    History   Patient reports she has been doing well. Comments blood pressure has been controlled. Patient states she doesn't think she needs to follow up with cardiology. Patient states she tripped while in [place] falling hitting her left knee. Comments this morinng during her walk she was experiencing some right leg pain.   Patient Active Problem List   Diagnosis Code    Essential hypertension, benign I10    Pure hypercholesterolemia E78.00    GERD (gastroesophageal reflux disease) K21.9    Vitamin D deficiency E55.9    PND (paroxysmal nocturnal dyspnea) R06.00    Pain in heel M79.673    Situational stress F43.9    Influenza vaccination declined Z28.21    23-polyvalent pneumococcal polysaccharide vaccine declined Z28.21    Overweight (BMI 25.0-29. 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 Young Mosquera (March 2012)    Vaginitis atrophic 7-29-09    Vitamin D deficiency 4-2009      Past Surgical History:   Procedure Laterality Date    HX HYSTERECTOMY  1960's    NE COLONOSCOPY FLX DX W/COLLJ SPEC WHEN PFRMD  2004    normal ; Dr. Haleigh Guerrero     Current Outpatient Medications   Medication Sig Dispense Refill    simvastatin (ZOCOR) 10 mg tablet TAKE 1 TABLET EVERY NIGHT 90 Tablet 2    metoprolol succinate (TOPROL-XL) 100 mg tablet TAKE 1 TABLET EVERY DAY 90 Tablet 2    chlorthalidone (HYGROTON) 25 mg tablet Take 1 Tab by mouth daily. 90 Tab 3    potassium 99 mg tablet Take 99 mg by mouth daily.  omeprazole (PRILOSEC) 20 mg capsule Take 1 Cap by mouth two (2) times a day. 60 Cap 0    aspirin delayed-release 81 mg tablet Take 81 mg by mouth every other day. Does not take daily      omega-3 fatty acids-vitamin e (FISH OIL) 1,000 mg Cap Take 1 Cap by mouth daily.  Cholecalciferol, Vitamin D3, (VITAMIN D) 2,000 unit Cap Take  by mouth daily.        Allergies   Allergen Reactions    Naproxen Palpitations       Family History   Problem Relation Age of Onset    Hypertension Mother    [facility] Arthritis-osteo Mother     Heart Attack Father     High Cholesterol Father     Hypertension Sister     High Cholesterol Sister     Breast Cancer Sister     Hypertension Brother      Social History     Tobacco Use    Smoking status: Former Smoker     Packs/day: 0.50     Years: 15.00     Pack years: 7.50     Quit date: 12/13/1979     Years since quittin.8    Smokeless tobacco: Never Used   Substance Use Topics    Alcohol use: No     Review of Systems   Constitutional: Negative for chills and fever. Respiratory: Negative for shortness of breath. Cardiovascular: Negative for chest pain and palpitations. Musculoskeletal: Positive for joint pain (left knee pain). Neurological: Negative for dizziness and headaches. Visit Vitals  /72 (BP 1 Location: Left arm, BP Patient Position: Sitting, BP Cuff Size: Adult)   Pulse 73   Temp 97.1 °F (36.2 °C) (Temporal)   Resp 20   Ht 5' 3\" (1.6 m)   Wt 144 lb (65.3 kg)   SpO2 100%   BMI 25.51 kg/m²     Physical Exam  Constitutional:       General: She is not in acute distress. Appearance: She is well-developed. HENT:      Head: Normocephalic and atraumatic. Neck:      Vascular: No carotid bruit. Cardiovascular:      Rate and Rhythm: Normal rate and regular rhythm. Heart sounds: Normal heart sounds. No murmur heard. No friction rub. No gallop. Pulmonary:      Effort: Pulmonary effort is normal.      Breath sounds: Normal breath sounds. No decreased breath sounds, wheezing, rhonchi or rales. Abdominal:      General: Bowel sounds are normal.      Palpations: Abdomen is soft. Tenderness: There is no abdominal tenderness. Musculoskeletal:      Cervical back: Normal range of motion and neck supple. Right knee: Ecchymosis (mid) present. Tenderness present over the medial joint line. Legs:    Lymphadenopathy:      Cervical: No cervical adenopathy. Skin:     General: Skin is warm and dry. Neurological:      Mental Status: She is alert and oriented to person, place, and time.

## 2021-09-21 NOTE — ACP (ADVANCE CARE PLANNING)
Advance Care Planning     General Advance Care Planning (ACP) Conversation      Date of Conversation: 9/21/2021  Conducted with: Patient with Decision Making Capacity    Healthcare Decision Maker:     Primary Decision Maker: Lana Bourgeois - Child - [phone]  Click here to complete [address] including selection of the Healthcare Decision Maker Relationship (ie \"Primary\")      Content/Action Overview:   Has NO ACP documents/care preferences - information provided, considering goals and options  Reviewed DNR/DNI and patient elects Full Code (Attempt Resuscitation)    Length of Voluntary ACP Conversation in minutes:  <16 minutes (Non-Billable)    Marcelo Hogue, NP

## 2021-09-21 NOTE — PATIENT INSTRUCTIONS
Medicare Wellness Visit, Female     The best way to live healthy is to have a lifestyle where you eat a well-balanced diet, exercise regularly, limit alcohol use, and quit all forms of tobacco/nicotine, if applicable. Regular preventive services are another way to keep healthy. Preventive services (vaccines, screening tests, monitoring & exams) can help personalize your care plan, which helps you manage your own care. Screening tests can find health problems at the earliest stages, when they are easiest to treat. [facility] follows the current, evidence-based guidelines published by the [facility] ([facility]) when recommending preventive services for our patients. Because we follow these guidelines, sometimes recommendations change over time as research supports it. (For example, mammograms used to be recommended annually. Even though Medicare will still pay for an annual mammogram, the newer guidelines recommend a mammogram every two years for women of average risk). Of course, you and your doctor may decide to screen more often for some diseases, based on your risk and your co-morbidities (chronic disease you are already diagnosed with). Preventive services for you include:  - Medicare offers their members a free annual wellness visit, which is time for you and your primary care provider to discuss and plan for your preventive service needs. Take advantage of this benefit every year!  -All adults over the age of 72 should receive the recommended pneumonia vaccines. Current USPSTF guidelines recommend a series of two vaccines for the best pneumonia protection.   -All adults should have a flu vaccine yearly and a tetanus vaccine every 10 years.   -All adults age 48 and older should receive the shingles vaccines (series of two vaccines).       -All adults age 38-68 who are overweight should have a diabetes screening test once every three years.   -All adults born between [birth year] and 1965 should be screened once for Hepatitis C.  -Other screening tests and preventive services for persons with diabetes include: an eye exam to screen for diabetic retinopathy, a kidney function test, a foot exam, and stricter control over your cholesterol.   -Cardiovascular screening for adults with routine risk involves an electrocardiogram (ECG) at intervals determined by your doctor.   -Colorectal cancer screenings should be done for adults age 54-65 with no increased risk factors for colorectal cancer. There are a number of acceptable methods of screening for this type of cancer. Each test has its own benefits and drawbacks. Discuss with your doctor what is most appropriate for you during your annual wellness visit. The different tests include: colonoscopy (considered the best screening method), a fecal occult blood test, a fecal DNA test, and sigmoidoscopy.    -A bone mass density test is recommended when a woman turns 65 to screen for osteoporosis. This test is only recommended one time, as a screening. Some providers will use this same test as a disease monitoring tool if you already have osteoporosis. -Breast cancer screenings are recommended every other year for women of normal risk, age 54-69.  -Cervical cancer screenings for women over age 72 are only recommended with certain risk factors. Here is a list of your current Health Maintenance items (your personalized list of preventive services) with a due date:  Health Maintenance Due   Topic Date Due    Shingles Vaccine (1 of 2) Never done    Pneumococcal Vaccine (1 of 1 - PPSV23) Never done    Mammogram  10/07/2021            Knee Pain or Injury: Care Instructions  Your Care Instructions     Injuries are a common cause of knee problems. Sudden (acute) injuries may be caused by a direct blow to the knee. They can also be caused by abnormal twisting, bending, or falling on the knee.  Pain, bruising, or swelling may be severe, and may start within minutes of the injury. Overuse is another cause of knee pain. Other causes are climbing stairs, kneeling, and other activities that use the knee. Everyday wear and tear, especially as you get older, also can cause knee pain. Rest, along with home treatment, often relieves pain and allows your knee to heal. If you have a serious knee injury, you may need tests and treatment. Follow-up care is a key part of your treatment and safety. Be sure to make and go to all appointments, and call your doctor if you are having problems. It's also a good idea to know your test results and keep a list of the medicines you take. How can you care for yourself at home? · Be safe with medicines. Read and follow all instructions on the label. ? If the doctor gave you a prescription medicine for pain, take it as prescribed. ? If you are not taking a prescription pain medicine, ask your doctor if you can take an over-the-counter medicine. · Rest and protect your knee. Take a break from any activity that may cause pain. · Put ice or a cold pack on your knee for 10 to 20 minutes at a time. Put a thin cloth between the ice and your skin. · Prop up a sore knee on a pillow when you ice it or anytime you sit or lie down for the next 3 days. Try to keep it above the level of your heart. This will help reduce swelling. · If your knee is not swollen, you can put moist heat, a heating pad, or a warm cloth on your knee. · If your doctor recommends an elastic bandage, sleeve, or other type of support for your knee, wear it as directed. · Follow your doctor's instructions about how much weight you can put on your leg. Use a cane, crutches, or a walker as instructed. · Follow your doctor's instructions about activity during your healing process. If you can do mild exercise, slowly increase your activity. · Reach and stay at a healthy weight.  Extra weight can strain the joints, especially the knees and hips, and make the pain worse. Losing even a few pounds may help. When should you call for help? Call 911 anytime you think you may need emergency care. For example, call if:    · You have symptoms of a blood clot in your lung (called a pulmonary embolism). These may include:  ? Sudden chest pain. ? Trouble breathing. ? Coughing up blood. Call your doctor now or seek immediate medical care if:    · You have severe or increasing pain.     · Your leg or foot turns cold or changes color.     · You cannot stand or put weight on your knee.     · Your knee looks twisted or bent out of shape.     · You cannot move your knee.     · You have signs of infection, such as:  ? Increased pain, swelling, warmth, or redness. ? Red streaks leading from the knee. ? Pus draining from a place on your knee. ? A fever.     · You have signs of a blood clot in your leg (called a deep vein thrombosis), such as:  ? Pain in your calf, back of the knee, thigh, or groin. ? Redness and swelling in your leg or groin. Watch closely for changes in your health, and be sure to contact your doctor if:    · You have tingling, weakness, or numbness in your knee.     · You have any new symptoms, such as swelling.     · You have bruises from a knee injury that last longer than 2 weeks.     · You do not get better as expected. Where can you learn more? Go to http://www.gray.com/  Enter K195 in the search box to learn more about \"Knee Pain or Injury: Care Instructions. \"  Current as of: July 1, 2021               Content Version: 13.0  © 8296-7607 [employer]. Care instructions adapted under license by [employer] (which disclaims liability or warranty for this information). If you have questions about a medical condition or this instruction, always ask your healthcare professional. [address] any warranty or liability for your use of this information.

## 2021-09-24 ENCOUNTER — APPOINTMENT (OUTPATIENT)
Dept: FAMILY MEDICINE CLINIC | Age: 76
End: 2021-09-24

## 2021-09-24 DIAGNOSIS — I10 ESSENTIAL HYPERTENSION, BENIGN: ICD-10-CM

## 2021-09-24 DIAGNOSIS — E78.00 PURE HYPERCHOLESTEROLEMIA: ICD-10-CM

## 2021-09-25 LAB
ALBUMIN SERPL-MCNC: 4 G/DL (ref 3.7–4.7)
ALBUMIN/GLOB SERPL: 1.4 {RATIO} (ref 1.2–2.2)
ALP SERPL-CCNC: 51 IU/L (ref 44–121)
ALT SERPL-CCNC: 12 IU/L (ref 0–32)
AST SERPL-CCNC: 21 IU/L (ref 0–40)
BILIRUB SERPL-MCNC: 0.3 MG/DL (ref 0–1.2)
BUN SERPL-MCNC: 17 MG/DL (ref 8–27)
BUN/CREAT SERPL: 17 (ref 12–28)
CALCIUM SERPL-MCNC: 9.3 MG/DL (ref 8.7–10.3)
CHLORIDE SERPL-SCNC: 97 MMOL/L (ref 96–106)
CHOLEST SERPL-MCNC: 218 MG/DL (ref 100–199)
CO2 SERPL-SCNC: 29 MMOL/L (ref 20–29)
CREAT SERPL-MCNC: 1.03 MG/DL (ref 0.57–1)
GLOBULIN SER CALC-MCNC: 2.9 G/DL (ref 1.5–4.5)
GLUCOSE SERPL-MCNC: 81 MG/DL (ref 65–99)
HDLC SERPL-MCNC: 56 MG/DL
LDLC SERPL CALC-MCNC: 149 MG/DL (ref 0–99)
POTASSIUM SERPL-SCNC: 3.4 MMOL/L (ref 3.5–5.2)
PROT SERPL-MCNC: 6.9 G/DL (ref 6–8.5)
SODIUM SERPL-SCNC: 141 MMOL/L (ref 134–144)
TRIGL SERPL-MCNC: 72 MG/DL (ref 0–149)
VLDLC SERPL CALC-MCNC: 13 MG/DL (ref 5–40)

## 2021-09-29 ENCOUNTER — TRANSCRIBE ORDER (OUTPATIENT)
Dept: SCHEDULING | Age: 76
End: 2021-09-29

## 2021-09-29 DIAGNOSIS — Z12.31 VISIT FOR SCREENING MAMMOGRAM: Primary | ICD-10-CM

## 2021-10-13 ENCOUNTER — HOSPITAL ENCOUNTER (OUTPATIENT)
Dept: MAMMOGRAPHY | Age: 76
Discharge: HOME | End: 2021-10-13
Attending: NURSE PRACTITIONER
Payer: MEDICARE

## 2021-10-13 DIAGNOSIS — Z12.31 BREAST CANCER SCREENING BY MAMMOGRAM: ICD-10-CM

## 2021-10-13 PROCEDURE — 77063 BREAST TOMOSYNTHESIS BI: CPT

## 2021-11-18 ENCOUNTER — CLINICAL SUPPORT (OUTPATIENT)
Dept: FAMILY MEDICINE CLINIC | Age: 76
End: 2021-11-18
Payer: MEDICARE

## 2021-11-18 VITALS — TEMPERATURE: 96 F

## 2021-11-18 DIAGNOSIS — Z23 NEEDS FLU SHOT: Primary | ICD-10-CM

## 2021-11-18 PROCEDURE — G0008 ADMIN INFLUENZA VIRUS VAC: HCPCS | Performed by: NURSE PRACTITIONER

## 2021-11-18 PROCEDURE — 90694 VACC AIIV4 NO PRSRV 0.5ML IM: CPT | Performed by: NURSE PRACTITIONER

## 2021-11-18 NOTE — PROGRESS NOTES
America Madrigal is a 78 y.o. female who presents for routine immunizations. She denies any symptoms , reactions or allergies that would exclude them from being immunized today. Risks and adverse reactions were discussed and the VIS was given to them. All questions were addressed. She refused to stay for observation. There were no reaction (s) observed when patient left clinic. Verbal order for Flu IM received per Ever German NP for pt America Madrigal. Order written down and read back to provider for verification prior to completion.

## 2022-01-12 ENCOUNTER — TELEPHONE (OUTPATIENT)
Dept: FAMILY MEDICINE CLINIC | Age: 77
End: 2022-01-12

## 2022-01-12 NOTE — TELEPHONE ENCOUNTER
----- Message from Brittni Marquez sent at 1/12/2022  1:45 PM EST -----  Subject: Message to Provider    QUESTIONS  Information for Provider? PT is calling to schedule labs before her   appointment on 01/21/22. Office is ringing busy. Please advise.   ---------------------------------------------------------------------------  --------------  CALL BACK INFO  What is the best way for the office to contact you? OK to leave message on   voicemail  Preferred Call Back Phone Number?  [phone]  ---------------------------------------------------------------------------  --------------  SCRIPT ANSWERS  undefined

## 2022-01-19 NOTE — TELEPHONE ENCOUNTER
Spoke with patient  Rescheduled f/u appointment and informed patient labs can completed at [facility] or [facility] - patient verbalized understanding

## 2022-02-22 ENCOUNTER — LAB ONLY (OUTPATIENT)
Dept: FAMILY MEDICINE CLINIC | Age: 77
End: 2022-02-22

## 2022-02-22 DIAGNOSIS — I10 ESSENTIAL HYPERTENSION, BENIGN: Primary | ICD-10-CM

## 2022-02-22 DIAGNOSIS — E78.00 PURE HYPERCHOLESTEROLEMIA: ICD-10-CM

## 2022-02-23 LAB
ALBUMIN SERPL-MCNC: 4 G/DL (ref 3.7–4.7)
ALBUMIN/GLOB SERPL: 1.4 {RATIO} (ref 1.2–2.2)
ALP SERPL-CCNC: 49 IU/L (ref 44–121)
ALT SERPL-CCNC: 11 IU/L (ref 0–32)
AST SERPL-CCNC: 16 IU/L (ref 0–40)
BILIRUB SERPL-MCNC: 0.3 MG/DL (ref 0–1.2)
BUN SERPL-MCNC: 15 MG/DL (ref 8–27)
BUN/CREAT SERPL: 18 (ref 12–28)
CALCIUM SERPL-MCNC: 9.6 MG/DL (ref 8.7–10.3)
CHLORIDE SERPL-SCNC: 93 MMOL/L (ref 96–106)
CHOLEST SERPL-MCNC: 186 MG/DL (ref 100–199)
CO2 SERPL-SCNC: 26 MMOL/L (ref 20–29)
CREAT SERPL-MCNC: 0.82 MG/DL (ref 0.57–1)
GLOBULIN SER CALC-MCNC: 2.9 G/DL (ref 1.5–4.5)
GLUCOSE SERPL-MCNC: 73 MG/DL (ref 65–99)
HDLC SERPL-MCNC: 58 MG/DL
LDLC SERPL CALC-MCNC: 118 MG/DL (ref 0–99)
POTASSIUM SERPL-SCNC: 3.3 MMOL/L (ref 3.5–5.2)
PROT SERPL-MCNC: 6.9 G/DL (ref 6–8.5)
SODIUM SERPL-SCNC: 137 MMOL/L (ref 134–144)
TRIGL SERPL-MCNC: 53 MG/DL (ref 0–149)
VLDLC SERPL CALC-MCNC: 10 MG/DL (ref 5–40)

## 2022-03-02 ENCOUNTER — OFFICE VISIT (OUTPATIENT)
Dept: FAMILY MEDICINE CLINIC | Age: 77
End: 2022-03-02
Payer: MEDICARE

## 2022-03-02 VITALS
OXYGEN SATURATION: 99 % | RESPIRATION RATE: 16 BRPM | SYSTOLIC BLOOD PRESSURE: 138 MMHG | DIASTOLIC BLOOD PRESSURE: 85 MMHG | BODY MASS INDEX: 26.47 KG/M2 | TEMPERATURE: 98.6 F | WEIGHT: 149.4 LBS | HEIGHT: 63 IN | HEART RATE: 69 BPM

## 2022-03-02 DIAGNOSIS — E78.00 PURE HYPERCHOLESTEROLEMIA: ICD-10-CM

## 2022-03-02 DIAGNOSIS — E55.9 VITAMIN D DEFICIENCY: ICD-10-CM

## 2022-03-02 DIAGNOSIS — K21.9 GASTROESOPHAGEAL REFLUX DISEASE WITHOUT ESOPHAGITIS: ICD-10-CM

## 2022-03-02 DIAGNOSIS — M85.89 OSTEOPENIA OF MULTIPLE SITES: ICD-10-CM

## 2022-03-02 DIAGNOSIS — I10 ESSENTIAL HYPERTENSION, BENIGN: Primary | ICD-10-CM

## 2022-03-02 DIAGNOSIS — K44.9 HIATAL HERNIA: ICD-10-CM

## 2022-03-02 DIAGNOSIS — N95.1 POST MENOPAUSAL SYNDROME: ICD-10-CM

## 2022-03-02 PROCEDURE — G8752 SYS BP LESS 140: HCPCS | Performed by: NURSE PRACTITIONER

## 2022-03-02 PROCEDURE — G8399 PT W/DXA RESULTS DOCUMENT: HCPCS | Performed by: NURSE PRACTITIONER

## 2022-03-02 PROCEDURE — G9899 SCRN MAM PERF RSLTS DOC: HCPCS | Performed by: NURSE PRACTITIONER

## 2022-03-02 PROCEDURE — 99214 OFFICE O/P EST MOD 30 MIN: CPT | Performed by: NURSE PRACTITIONER

## 2022-03-02 PROCEDURE — 1090F PRES/ABSN URINE INCON ASSESS: CPT | Performed by: NURSE PRACTITIONER

## 2022-03-02 PROCEDURE — G8427 DOCREV CUR MEDS BY ELIG CLIN: HCPCS | Performed by: NURSE PRACTITIONER

## 2022-03-02 PROCEDURE — 3017F COLORECTAL CA SCREEN DOC REV: CPT | Performed by: NURSE PRACTITIONER

## 2022-03-02 PROCEDURE — G8432 DEP SCR NOT DOC, RNG: HCPCS | Performed by: NURSE PRACTITIONER

## 2022-03-02 PROCEDURE — G8536 NO DOC ELDER MAL SCRN: HCPCS | Performed by: NURSE PRACTITIONER

## 2022-03-02 PROCEDURE — G8419 CALC BMI OUT NRM PARAM NOF/U: HCPCS | Performed by: NURSE PRACTITIONER

## 2022-03-02 PROCEDURE — 1101F PT FALLS ASSESS-DOCD LE1/YR: CPT | Performed by: NURSE PRACTITIONER

## 2022-03-02 PROCEDURE — G8754 DIAS BP LESS 90: HCPCS | Performed by: NURSE PRACTITIONER

## 2022-03-02 RX ORDER — FAMOTIDINE 40 MG/1
40 TABLET, FILM COATED ORAL
Qty: 90 TABLET | Refills: 0 | Status: SHIPPED | OUTPATIENT
Start: 2022-03-02 | End: 2022-03-30

## 2022-03-02 RX ORDER — HYDROGEN PEROXIDE 3 %
20 SOLUTION, NON-ORAL MISCELLANEOUS
Qty: 90 CAPSULE | Refills: 1 | Status: SHIPPED | OUTPATIENT
Start: 2022-03-02 | End: 2022-03-30

## 2022-03-02 NOTE — PROGRESS NOTES
Depression Screening:  3 most recent PHQ Screens 3/2/2022   PHQ Not Done -   Little interest or pleasure in doing things Not at all   Feeling down, depressed, irritable, or hopeless Not at all   Total Score PHQ 2 0   Trouble falling or staying asleep, or sleeping too much -   Feeling tired or having little energy -   Poor appetite, weight loss, or overeating -   Feeling bad about yourself - or that you are a failure or have let yourself or your family down -   Trouble concentrating on things such as school, work, reading, or watching TV -   Moving or speaking so slowly that other people could have noticed; or the opposite being so fidgety that others notice -   Thoughts of being better off dead, or hurting yourself in some way -   PHQ 9 Score -   How difficult have these problems made it for you to do your work, take care of your home and get along with others -       Learning Assessment:  Learning Assessment 1/16/2020   PRIMARY LEARNER Patient   HIGHEST LEVEL OF EDUCATION - PRIMARY LEARNER  GRADUATED HIGH SCHOOL OR GED   BARRIERS PRIMARY LEARNER NONE   CO-LEARNER CAREGIVER No   PRIMARY LANGUAGE ENGLISH   LEARNER PREFERENCE PRIMARY DEMONSTRATION   ANSWERED BY patient   RELATIONSHIP SELF       Abuse Screening:  Abuse Screening Questionnaire 10/6/2020   Do you ever feel afraid of your partner? N   Are you in a relationship with someone who physically or mentally threatens you? N   Is it safe for you to go home? Y       Fall Risk  Fall Risk Assessment, last 12 mths 3/2/2022   Able to walk?  Yes   Fall in past 12 months? 0   Do you feel unsteady? -   Are you worried about falling -   Is the gait abnormal? -   Number of falls in past 12 months -   Fall with injury? -       ADL  ADL Assessment 10/22/2018   Feeding yourself No Help Needed   Getting from bed to chair No Help Needed   Getting dressed No Help Needed   Bathing or showering No Help Needed   Walk across the room (includes cane/walker) No Help Needed   Using the telphone No Help Needed   Taking your medications No Help Needed   Preparing meals No Help Needed   Managing money (expenses/bills) No Help Needed   Moderately strenuous housework (laundry) No Help Needed   Shopping for personal items (toiletries/medicines) No Help Needed   Shopping for groceries No Help Needed   Driving No Help Needed   Climbing a flight of stairs No Help Needed   Getting to places beyond walking distances No Help Needed       Travel Screening:    Travel Screening     Question   Response    In the last month, have you been in contact with someone who was confirmed or suspected to have Coronavirus / COVID-19? No / Unsure    Have you had a COVID-19 viral test in the last 14 days? No    Do you have any of the following new or worsening symptoms? None of these    Have you traveled internationally or domestically in the last month? No      Travel History   Travel since 02/02/22    No documented travel since 02/02/22         Health Maintenance reviewed and discussed and ordered per Provider. Health Maintenance Due   Topic Date Due    Shingrix Vaccine Age 49> (1 of 2) Never done    Pneumococcal 65+ years (1 of 1 - PPSV23) Never done    COVID-19 Vaccine (3 - Booster for Moderna series) 08/23/2021   . Ragini Amadorjosue presents today for   Chief Complaint   Patient presents with    Follow-up    Cholesterol Problem    Hypertension       Is someone accompanying this pt? no    Is the patient using any DME equipment during OV? no    Coordination of Care:  1. Have you been to the ER, urgent care clinic since your last visit? Hospitalized since your last visit? no    2. Have you seen or consulted any other health care providers outside of the [address] [place] [address] [facility] since your last visit? Include any pap smears or colon screening.  no

## 2022-03-02 NOTE — PROGRESS NOTES
Montana Shay is a 78 y.o. female who was seen in clinic today (3/2/2022) for Follow-up, Cholesterol Problem, and Hypertension    Assessment & Plan:   Diagnoses and all orders for this visit:    1. Essential hypertension, benign    2. Pure hypercholesterolemia    3. Osteopenia of multiple sites  -     DEXA BONE DENSITY STUDY AXIAL; Future    4. Vitamin D deficiency    5. Hiatal hernia  -     XR BA SWALLOW ESOPHOGRAM; Future    6. Gastroesophageal reflux disease without esophagitis  -     XR BA SWALLOW ESOPHOGRAM; Future    7. Post menopausal syndrome  -     DEXA BONE DENSITY STUDY AXIAL; Future    Other orders  -     esomeprazole (NEXIUM) 20 mg capsule; Take 1 Capsule by mouth Daily (before breakfast). -     famotidine (PEPCID) 40 mg tablet; Take 1 Tablet by mouth nightly. Instructed to begin taking omeprazole 20 mg twice a day to help with GERD symptoms until she receives Nexium and Pepcid for mail order. I have discussed the diagnosis with the patient and the intended plan as seen in the above orders. The patient has received an after-visit summary and questions were answered concerning future plans. I have discussed medication side effects and warnings with the patient as well. Patient agreeable with above plan and verbalizes understanding. Follow-up and Dispositions    · Return in about 4 weeks (around 3/30/2022) for GERD/bone density, virtual follow up. Subjective:   Hypertension ROS: taking medications as instructed, no medication side effects noted, no TIA's, no chest pain on exertion, no dyspnea on exertion, no swelling of ankles. She does adhere to healthier eating habits. Comments she resumed walking this week after recently loosing her mother. Patient states she has fullness in mid sternal chest typically at night. Comments she has been taking otc prilosec but this hasn't been as effective as of late.   She also has been drinking vinegar mixed with water daily which has helped some.    Patient reports she has been receiving reminder from her insurance she is due for a bone density. Last bone density was 2017. Lab Results   Component Value Date/Time    Sodium 137 02/22/2022 11:22 AM    Potassium 3.3 (L) 02/22/2022 11:22 AM    Chloride 93 (L) 02/22/2022 11:22 AM    CO2 26 02/22/2022 11:22 AM    Anion gap 5 03/09/2021 11:03 PM    Glucose 73 02/22/2022 11:22 AM    BUN 15 02/22/2022 11:22 AM    Creatinine 0.82 02/22/2022 11:22 AM    BUN/Creatinine ratio 18 02/22/2022 11:22 AM    GFR est AA 82 02/22/2022 11:22 AM    GFR est non-AA 71 02/22/2022 11:22 AM    Calcium 9.6 02/22/2022 11:22 AM    Bilirubin, total 0.3 02/22/2022 11:22 AM    Alk.  phosphatase 49 02/22/2022 11:22 AM    Protein, total 6.9 02/22/2022 11:22 AM    Albumin 4.0 02/22/2022 11:22 AM    Globulin 3.4 10/18/2017 08:17 AM    A-G Ratio 1.4 02/22/2022 11:22 AM    ALT (SGPT) 11 02/22/2022 11:22 AM    AST (SGOT) 16 02/22/2022 11:22 AM     Lab Results   Component Value Date/Time    Cholesterol, total 186 02/22/2022 11:22 AM    HDL Cholesterol 58 02/22/2022 11:22 AM    LDL, calculated 118 (H) 02/22/2022 11:22 AM    LDL, calculated 143 (H) 06/23/2020 08:14 AM    VLDL, calculated 10 02/22/2022 11:22 AM    VLDL, calculated 12 06/23/2020 08:14 AM    Triglyceride 53 02/22/2022 11:22 AM    CHOL/HDL Ratio 3.3 04/25/2018 07:42 AM     No results found for: HBA1C, VQN9FOXL, OMW4QURI  Lab Results   Component Value Date/Time    Vitamin D 25-Hydroxy 55.1 10/18/2017 08:17 AM    VITAMIN D, 25-HYDROXY 63.5 10/06/2020 12:00 AM       Lab Results   Component Value Date/Time    WBC 7.7 03/09/2021 11:03 PM    HGB 13.2 03/09/2021 11:03 PM    HCT 42.3 03/09/2021 11:03 PM    PLATELET 314 45/09/5221 11:03 PM    MCV 82.1 03/09/2021 11:03 PM       Wt Readings from Last 3 Encounters:   03/02/22 149 lb 6.4 oz (67.8 kg)   09/21/21 144 lb (65.3 kg)   05/13/21 145 lb (65.8 kg)     Temp Readings from Last 3 Encounters:   03/02/22 98.6 °F (37 °C) (Temporal)   11/18/21 (!) 96 °F (35.6 °C) (Temporal)   09/21/21 97.1 °F (36.2 °C) (Temporal)     BP Readings from Last 3 Encounters:   03/02/22 138/85   09/21/21 125/72   05/13/21 136/84     Pulse Readings from Last 3 Encounters:   03/02/22 69   09/21/21 73   05/13/21 77       Prior to Admission medications    Medication Sig Start Date End Date Taking? Authorizing Provider   diclofenac (VOLTAREN) 1 % gel Apply 2 g to affected area four (4) times daily. 9/21/21  Yes Sb Inman NP   simvastatin (ZOCOR) 10 mg tablet TAKE 1 TABLET EVERY NIGHT 8/27/21  Yes Sb Inman NP   metoprolol succinate (TOPROL-XL) 100 mg tablet TAKE 1 TABLET EVERY DAY 8/27/21  Yes Sb Inman NP   chlorthalidone (HYGROTON) 25 mg tablet Take 1 Tab by mouth daily. 5/13/21  Yes Sb Inman NP   potassium 99 mg tablet Take 99 mg by mouth daily. Yes Provider, Historical   omeprazole (PRILOSEC) 20 mg capsule Take 1 Cap by mouth two (2) times a day. 7/3/19  Yes Sb Inman NP   omega-3 fatty acids-vitamin e (FISH OIL) 1,000 mg Cap Take 1 Cap by mouth daily. 4/6/10  Yes Provider, Historical   Cholecalciferol, Vitamin D3, (VITAMIN D) 2,000 unit Cap Take  by mouth daily. 8/11/10  Yes Provider, Historical   aspirin delayed-release 81 mg tablet Take 81 mg by mouth every other day. Does not take daily  Patient not taking: Reported on 9/21/2021    Provider, Historical         The following sections were reviewed & updated as appropriate: PMH, PSH, FH, and SH. Review of Systems   Constitutional: Negative for activity change, appetite change, chills, fatigue and fever. Respiratory: Negative for chest tightness and shortness of breath. Cardiovascular: Negative for chest pain. Gastrointestinal: Negative for abdominal pain, constipation and diarrhea. Neurological: Negative for dizziness and headaches.           Objective:     Visit Vitals  /85 (BP 1 Location: Left upper arm, BP Patient Position: Sitting)   Pulse 69   Temp 98.6 °F (37 °C) (Temporal)   Resp 16   Ht 5' 3\" (1.6 m)   Wt 149 lb 6.4 oz (67.8 kg)   SpO2 99%   BMI 26.47 kg/m²      Physical Exam  Constitutional:       General: She is not in acute distress. Appearance: She is well-developed. HENT:      Head: Normocephalic and atraumatic. Neck:      Vascular: No carotid bruit. Cardiovascular:      Rate and Rhythm: Normal rate and regular rhythm. Heart sounds: Normal heart sounds. No murmur heard. No friction rub. No gallop. Pulmonary:      Effort: Pulmonary effort is normal.      Breath sounds: Normal breath sounds. No decreased breath sounds, wheezing, rhonchi or rales. Abdominal:      General: Bowel sounds are normal.      Palpations: Abdomen is soft. Tenderness: There is no abdominal tenderness. Musculoskeletal:      Cervical back: Normal range of motion and neck supple. Lymphadenopathy:      Cervical: No cervical adenopathy. Skin:     General: Skin is warm and dry. Neurological:      Mental Status: She is alert and oriented to person, place, and time. Disclaimer: The patient understands our medical plan. Alternatives have been explained and offered. The risks, benefits and significant side effects of all medications have been reviewed. Anticipated time course and progression of condition reviewed. All questions have been addressed. She is encouraged to employ the information provided in the after visit summary, which was reviewed. Where applicable, she is instructed to call the clinic if she has not been notified either by phone or through [address] with the results of her tests or with an appointment plan for any referrals within 1 week(s). No news is not good news; it's no news. The patient  is to call if her condition worsens or fails to improve or if significant side effects are experienced. Aspects of this note may have been generated using voice recognition software. Despite editing, there may be unrecognized errors. Jack Hearn, NP

## 2022-03-02 NOTE — PATIENT INSTRUCTIONS
Learning About High-Potassium Foods  What foods are high in potassium? The foods you eat contain nutrients, such as vitamins and minerals. Potassium is a nutrient. Your body needs the right amount to stay healthy and work as it should. You can use the list below to help you make choices about which foods to eat. Foods are high in potassium if they have more than 200 mg per serving. Fruits  · Apricots, 2 raw  · Avocado, ½ fruit  · Banana, 1 medium  · [place], 1 fruit  · Nectarine, 1 fruit  · Orange, 1 fruit  · Prunes, 5 fruits  · Raisins, ¼ cup  Vegetables  · Artichoke, 1 medium  · Beets, ½ cup  · Broccoli, ½ cup  · Kale (raw), 1 cup  · Potato with skin, 1 medium  · Spinach, ½ cup  · Sweet potato, 1 medium  · Tomato sauce, ½ cup  · Zucchini, ½ cup  Dairy and dairy alternatives  · Milk, 1 cup  · Soy milk, 1 cup  · Yogurt, 6 oz  Meats and other protein foods  · Beans (lima, navy, white), ½ cup  · Beef, ground, 3 oz  · Chicken, 3 oz  · Fish (halibut, tuna, cod, snapper), 3 oz  · Nuts (almonds, hazelnuts, [place], cashew, pistachios), 1 oz  · Peanut butter, 2 Tbsp  · Peanuts, 1 oz  · [place] 3 oz  Seasonings  · Salt substitutes  Work with your doctor to find out how much of this nutrient you need. Depending on your health, you may need more or less of it in your diet. Where can you learn more? Go to http://www.gray.com/  Enter P450 in the search box to learn more about \"Learning About High-Potassium Foods. \"  Current as of: December 17, 2020               Content Version: 13.0  © 1185-2960 [employer]wise[employer]. Care instructions adapted under license by [employer] (which disclaims liability or warranty for this information). If you have questions about a medical condition or this instruction, always ask your healthcare professional. [place] [address] any warranty or liability for your use of this information.          Potassium-Rich Diet: Care Instructions  Your Care Instructions     Potassium is a mineral. It helps keep the right mix of fluids in your body. It also helps your nerves and muscles work as they should. You'll find it in milk and meats. It's also in all fresh foods, including fruits and vegetables. Most adults need about 5 grams of potassium a day. The foods you eat should supply all that you need. Some health conditions can cause a loss of potassium. For example, kidney problems and stomach problems with vomiting and diarrhea can cause you to lose this mineral. Some medicines, such as water pills (diuretics), can cause low potassium. If you can't get enough potassium from what you eat, your doctor may advise you to take supplements. Follow-up care is a key part of your treatment and safety. Be sure to make and go to all appointments, and call your doctor if you are having problems. It's also a good idea to know your test results and keep a list of the medicines you take. How can you care for yourself at home? · Plan your diet around foods that are rich in potassium. Fresh, unprocessed whole foods have the most. These foods include:  ? Milk and other dairy products. ? Vegetables, especially broccoli, cooked dry beans, tomatoes, potatoes, artichokes, winter squash, and spinach. ? Fruits, especially citrus fruits, bananas, and apricots. Dried apricots contain more potassium than fresh apricots. ? Meat, poultry, and fish. · Ask your doctor about using a salt substitute or \"light\" salt. These often contain potassium. Where can you learn more? Go to http://rad-batool.info/  Enter H315 in the search box to learn more about \"Potassium-Rich Diet: Care Instructions. \"  Current as of: December 17, 2020               Content Version: 13.0  © 9371-4367 [employer]. Care instructions adapted under license by [employer] (which disclaims liability or warranty for this information).  If you have questions about a medical condition or this instruction, always ask your healthcare professional. [place] [address] any warranty or liability for your use of this information.

## 2022-03-07 ENCOUNTER — HOSPITAL ENCOUNTER (OUTPATIENT)
Dept: BONE DENSITY | Age: 77
Discharge: HOME | End: 2022-03-07
Attending: NURSE PRACTITIONER
Payer: MEDICARE

## 2022-03-07 DIAGNOSIS — N95.1 POST MENOPAUSAL SYNDROME: ICD-10-CM

## 2022-03-07 DIAGNOSIS — M85.89 OSTEOPENIA OF MULTIPLE SITES: ICD-10-CM

## 2022-03-07 PROCEDURE — 77080 DXA BONE DENSITY AXIAL: CPT

## 2022-03-18 PROBLEM — M85.89 OSTEOPENIA OF MULTIPLE SITES: Status: ACTIVE | Noted: 2018-01-24

## 2022-03-19 PROBLEM — Z90.710 ACQUIRED ABSENCE OF BOTH CERVIX AND UTERUS: Status: ACTIVE | Noted: 2017-01-25

## 2022-03-24 DIAGNOSIS — I10 ESSENTIAL HYPERTENSION, BENIGN: ICD-10-CM

## 2022-03-28 RX ORDER — CHLORTHALIDONE 25 MG/1
TABLET ORAL
Qty: 90 TABLET | Refills: 3 | Status: SHIPPED | OUTPATIENT
Start: 2022-03-28

## 2022-03-28 RX ORDER — SIMVASTATIN 10 MG/1
TABLET, FILM COATED ORAL
Qty: 90 TABLET | Refills: 3 | Status: SHIPPED | OUTPATIENT
Start: 2022-03-28

## 2022-03-28 RX ORDER — METOPROLOL SUCCINATE 100 MG/1
TABLET, EXTENDED RELEASE ORAL
Qty: 90 TABLET | Refills: 3 | Status: SHIPPED | OUTPATIENT
Start: 2022-03-28

## 2022-03-30 ENCOUNTER — VIRTUAL VISIT (OUTPATIENT)
Dept: FAMILY MEDICINE CLINIC | Age: 77
End: 2022-03-30
Payer: MEDICARE

## 2022-03-30 DIAGNOSIS — M85.89 OSTEOPENIA OF MULTIPLE SITES: ICD-10-CM

## 2022-03-30 DIAGNOSIS — K21.9 GASTROESOPHAGEAL REFLUX DISEASE WITHOUT ESOPHAGITIS: Primary | ICD-10-CM

## 2022-03-30 DIAGNOSIS — E55.9 VITAMIN D DEFICIENCY: ICD-10-CM

## 2022-03-30 DIAGNOSIS — I10 ESSENTIAL HYPERTENSION, BENIGN: ICD-10-CM

## 2022-03-30 DIAGNOSIS — E78.00 PURE HYPERCHOLESTEROLEMIA: ICD-10-CM

## 2022-03-30 PROCEDURE — 1090F PRES/ABSN URINE INCON ASSESS: CPT | Performed by: NURSE PRACTITIONER

## 2022-03-30 PROCEDURE — G8399 PT W/DXA RESULTS DOCUMENT: HCPCS | Performed by: NURSE PRACTITIONER

## 2022-03-30 PROCEDURE — 99212 OFFICE O/P EST SF 10 MIN: CPT | Performed by: NURSE PRACTITIONER

## 2022-03-30 PROCEDURE — G8427 DOCREV CUR MEDS BY ELIG CLIN: HCPCS | Performed by: NURSE PRACTITIONER

## 2022-03-30 PROCEDURE — 1101F PT FALLS ASSESS-DOCD LE1/YR: CPT | Performed by: NURSE PRACTITIONER

## 2022-03-30 PROCEDURE — 3017F COLORECTAL CA SCREEN DOC REV: CPT | Performed by: NURSE PRACTITIONER

## 2022-03-30 PROCEDURE — G9899 SCRN MAM PERF RSLTS DOC: HCPCS | Performed by: NURSE PRACTITIONER

## 2022-03-30 PROCEDURE — G8432 DEP SCR NOT DOC, RNG: HCPCS | Performed by: NURSE PRACTITIONER

## 2022-03-30 PROCEDURE — G8756 NO BP MEASURE DOC: HCPCS | Performed by: NURSE PRACTITIONER

## 2022-03-30 RX ORDER — OMEPRAZOLE 20 MG/1
20 TABLET, DELAYED RELEASE ORAL DAILY
COMMUNITY

## 2022-03-30 NOTE — PROGRESS NOTES
Albin Crigler is a 78 y.o. female who was seen by synchronous (real-time) audio-video technology on 3/30/2022 for No chief complaint on file. Assessment & Plan:     Diagnoses and all orders for this visit:    1. Gastroesophageal reflux disease without esophagitis    2. Osteopenia of multiple sites      Follow-up and Dispositions    · Return in about 5 months (around 8/30/2022) for HTN/HLD, fasting labs 1 wk prior, in office follow up. Routing History       I spent at least 15 minutes on this visit with this established patient. 712  Subjective:   Patient states GERD symptoms have overall improved since her last visit. Reports she continued with prilosec in the morning. She has only had to take medication once at night due to increased GERD since her last visit. Reports she did receive nexium and pepcid however, since symptoms improved didn't begin medication. Reports she didn't get barium swallow completed due to the cost.  Discussed bone density results in detail and all questions answered. Prior to Admission medications    Medication Sig Start Date End Date Taking? Authorizing Provider   chlorthalidone (HYGROTON) 25 mg tablet TAKE 1 TABLET EVERY DAY 3/28/22   Jignesh DOWELL NP   metoprolol succinate (TOPROL-XL) 100 mg tablet TAKE 1 TABLET EVERY DAY 3/28/22   Jignesh DOWELL NP   simvastatin (ZOCOR) 10 mg tablet TAKE 1 TABLET EVERY NIGHT 3/28/22   Jignesh DOWELL NP   esomeprazole (NEXIUM) 20 mg capsule Take 1 Capsule by mouth Daily (before breakfast). 3/2/22   Jignesh DOWELL NP   famotidine (PEPCID) 40 mg tablet Take 1 Tablet by mouth nightly. 3/2/22   Jignesh DOWELL NP   diclofenac (VOLTAREN) 1 % gel Apply 2 g to affected area four (4) times daily. 9/21/21   Jignesh DOWELL NP   potassium 99 mg tablet Take 99 mg by mouth daily. Provider, Historical   aspirin delayed-release 81 mg tablet Take 81 mg by mouth every other day.  Does not take daily  Patient not taking: Reported on 9/21/2021 Provider, Historical   omega-3 fatty acids-vitamin e (FISH OIL) 1,000 mg Cap Take 1 Cap by mouth daily. 4/6/10   Provider, Historical   Cholecalciferol, Vitamin D3, (VITAMIN D) 2,000 unit Cap Take  by mouth daily. 8/11/10   Provider, Historical     Patient Active Problem List   Diagnosis Code    Essential hypertension, benign I10    Pure hypercholesterolemia E78.00    GERD (gastroesophageal reflux disease) K21.9    Vitamin D deficiency E55.9    PND (paroxysmal nocturnal dyspnea) R06.00    Pain in heel M79.673    Situational stress F43.9    Influenza vaccination declined Z28.21    23-polyvalent pneumococcal polysaccharide vaccine declined Z28.21    Overweight (BMI 25.0-29. 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    Osteopenia of multiple sites M85.89     Patient Active Problem List    Diagnosis Date Noted    Osteopenia of multiple sites 01/24/2018    Acquired absence of both cervix and uterus 01/25/2017    Overweight (BMI 25.0-29.9) 10/24/2016    Influenza vaccination declined 04/25/2016    23-polyvalent pneumococcal polysaccharide vaccine declined 04/25/2016    Situational stress 04/11/2016    Pain in heel 11/24/2014    PND (paroxysmal nocturnal dyspnea)     Vitamin D deficiency 03/11/2011    Essential hypertension, benign 04/06/2010    Pure hypercholesterolemia 04/06/2010    GERD (gastroesophageal reflux disease) 04/06/2010     Allergies   Allergen Reactions    Naproxen Palpitations     Past Medical History:   Diagnosis Date    Fibrocystic disease of breast     GERD (gastroesophageal reflux disease)     Hx of dizziness     Hypercholesterolemia     Hypertension     PND (paroxysmal nocturnal dyspnea)     Evaluated by Dr. Hilton Cleveland (March 2012)    Vaginitis atrophic 7-29-09    Vitamin D deficiency 4-2009     Past Surgical History:   Procedure Laterality Date    HX HYSTERECTOMY  1960's    [place] COLONOSCOPY FLX DX W/COLLJ SPEC WHEN PFRMD  2004    normal ; Dr. Oliva Gip Family History   Problem Relation Age of Onset    Hypertension Mother     OSTEOARTHRITIS Mother     Heart Attack Father     High Cholesterol Father     Hypertension Sister     High Cholesterol Sister     Breast Cancer Sister     Hypertension Brother      Social History     Tobacco Use    Smoking status: Former Smoker     Packs/day: 0.50     Years: 15.00     Pack years: 7.50     Quit date: 1979     Years since quittin.3    Smokeless tobacco: Never Used   Substance Use Topics    Alcohol use: No       ROS    Objective:     Patient-Reported Vitals 3/24/2021   Patient-Reported Pulse 72   Patient-Reported Systolic  190   Patient-Reported Diastolic 89      General: alert, cooperative, no distress   Mental  status: normal mood, behavior, speech, dress, motor activity, and thought processes, able to follow commands   HENT: NCAT   Neck: no visualized mass   Resp: no respiratory distress   Neuro: no gross deficits   Skin: no discoloration or lesions of concern on visible areas   Psychiatric: normal affect, consistent with stated mood, no evidence of hallucinations     Additional exam findings: We discussed the expected course, resolution and complications of the diagnosis(es) in detail. Medication risks, benefits, costs, interactions, and alternatives were discussed as indicated. I advised her to contact the office if her condition worsens, changes or fails to improve as anticipated. She expressed understanding with the diagnosis(es) and plan. Ana Clarity, was evaluated through a synchronous (real-time) audio-video encounter. The patient (or guardian if applicable) is aware that this is a billable service, which includes applicable co-pays. Verbal consent to proceed has been obtained.  The visit was conducted pursuant to the emergency declaration under the [facility], 1135 waiver authority and the [employer] and [employer] Appropriations Act. Patient identification was verified, and a caregiver was present when appropriate. The patient was located at home in a state where the provider was licensed to provide care.     Koby Giron NP

## 2022-03-30 NOTE — Clinical Note
HTN/HLD, fasting labs 1 wk prior, in office follow up in 5 months, please call patient with appt details. Thanks!

## 2022-08-24 ENCOUNTER — TELEPHONE (OUTPATIENT)
Dept: FAMILY MEDICINE CLINIC | Age: 77
End: 2022-08-24

## 2022-08-24 NOTE — TELEPHONE ENCOUNTER
Tried to contact patient about her symptoms she is currently having. No answer and left message for her to contact our office at her earliest convenience.

## 2022-08-24 NOTE — TELEPHONE ENCOUNTER
Patient calling through the nurse triage line stating that she is having reoccurring dizzy spells. Patient is concerned it has something to do with her blood pressure. This morning before she took her blood pressure medication her BP was 110/76. Pt also states that these spells only happen in the morning and by the afternoon the subside. Requesting a call back. Please advise.

## 2022-08-26 ENCOUNTER — TELEPHONE (OUTPATIENT)
Dept: FAMILY MEDICINE CLINIC | Age: 77
End: 2022-08-26

## 2022-08-26 NOTE — TELEPHONE ENCOUNTER
Patient contacted staff back regarding her symptoms from 24 August 2022. She stated that she has been dizzy for the last 2-3 days and believes that it my be from her blood pressure dropping or her blood sugar dropping. She stated that it only happens in the morning and evening and after she takes her medication and walks or eats she starts to feel better. She stated that for the last two day she has not taken her chlorthalidone and has been feeling much better since stopping her medication. She asked if she was able to completely stop this medication. PCM was notified and stated that she can stop her chlorthalidone however she needs to monitor her blood pressure and if it is consecutively above 140/90 (three times within a day, or more then one three days in a row) then she needs to immediately report to the hospital for evaluation. Patient was instructed that she also needs to contact her cardiologist and speak with them about the symptoms she is having due to the cardiologist being the one who placed her on this medication. Patient stated understanding and would be reaching out to them.

## 2022-09-14 ENCOUNTER — APPOINTMENT (OUTPATIENT)
Dept: FAMILY MEDICINE CLINIC | Age: 77
End: 2022-09-14

## 2022-09-14 DIAGNOSIS — E78.00 PURE HYPERCHOLESTEROLEMIA: ICD-10-CM

## 2022-09-14 DIAGNOSIS — I10 ESSENTIAL HYPERTENSION, BENIGN: ICD-10-CM

## 2022-09-14 DIAGNOSIS — E55.9 VITAMIN D DEFICIENCY: ICD-10-CM

## 2022-09-15 LAB
25(OH)D3+25(OH)D2 SERPL-MCNC: 70.7 NG/ML (ref 30–100)
ALBUMIN SERPL-MCNC: 4 G/DL (ref 3.7–4.7)
ALBUMIN/GLOB SERPL: 1.4 {RATIO} (ref 1.2–2.2)
ALP SERPL-CCNC: 48 IU/L (ref 44–121)
ALT SERPL-CCNC: 8 IU/L (ref 0–32)
AST SERPL-CCNC: 19 IU/L (ref 0–40)
BILIRUB SERPL-MCNC: 0.3 MG/DL (ref 0–1.2)
BUN SERPL-MCNC: 13 MG/DL (ref 8–27)
BUN/CREAT SERPL: 14 (ref 12–28)
CALCIUM SERPL-MCNC: 9.2 MG/DL (ref 8.7–10.3)
CHLORIDE SERPL-SCNC: 105 MMOL/L (ref 96–106)
CHOLEST SERPL-MCNC: 200 MG/DL (ref 100–199)
CO2 SERPL-SCNC: 23 MMOL/L (ref 20–29)
CREAT SERPL-MCNC: 0.95 MG/DL (ref 0.57–1)
EGFR: 62 ML/MIN/1.73
GLOBULIN SER CALC-MCNC: 2.9 G/DL (ref 1.5–4.5)
GLUCOSE SERPL-MCNC: 80 MG/DL (ref 65–99)
HDLC SERPL-MCNC: 55 MG/DL
LDLC SERPL CALC-MCNC: 135 MG/DL (ref 0–99)
POTASSIUM SERPL-SCNC: 4.4 MMOL/L (ref 3.5–5.2)
PROT SERPL-MCNC: 6.9 G/DL (ref 6–8.5)
SODIUM SERPL-SCNC: 143 MMOL/L (ref 134–144)
TRIGL SERPL-MCNC: 54 MG/DL (ref 0–149)
VLDLC SERPL CALC-MCNC: 10 MG/DL (ref 5–40)

## 2022-10-20 ENCOUNTER — OFFICE VISIT (OUTPATIENT)
Dept: FAMILY MEDICINE CLINIC | Age: 77
End: 2022-10-20
Payer: MEDICARE

## 2022-10-20 VITALS
HEIGHT: 63 IN | DIASTOLIC BLOOD PRESSURE: 80 MMHG | TEMPERATURE: 97.6 F | BODY MASS INDEX: 26.97 KG/M2 | OXYGEN SATURATION: 98 % | HEART RATE: 76 BPM | SYSTOLIC BLOOD PRESSURE: 114 MMHG | WEIGHT: 152.2 LBS | RESPIRATION RATE: 18 BRPM

## 2022-10-20 DIAGNOSIS — Z00.00 MEDICARE ANNUAL WELLNESS VISIT, SUBSEQUENT: Primary | ICD-10-CM

## 2022-10-20 DIAGNOSIS — Z12.31 ENCOUNTER FOR SCREENING MAMMOGRAM FOR MALIGNANT NEOPLASM OF BREAST: ICD-10-CM

## 2022-10-20 DIAGNOSIS — I10 ESSENTIAL HYPERTENSION, BENIGN: ICD-10-CM

## 2022-10-20 DIAGNOSIS — Z23 ENCOUNTER FOR IMMUNIZATION: ICD-10-CM

## 2022-10-20 DIAGNOSIS — E78.00 PURE HYPERCHOLESTEROLEMIA: ICD-10-CM

## 2022-10-20 DIAGNOSIS — E55.9 VITAMIN D DEFICIENCY: ICD-10-CM

## 2022-10-20 DIAGNOSIS — M85.89 OSTEOPENIA OF MULTIPLE SITES: ICD-10-CM

## 2022-10-20 PROCEDURE — G8754 DIAS BP LESS 90: HCPCS | Performed by: NURSE PRACTITIONER

## 2022-10-20 PROCEDURE — G8752 SYS BP LESS 140: HCPCS | Performed by: NURSE PRACTITIONER

## 2022-10-20 PROCEDURE — G8432 DEP SCR NOT DOC, RNG: HCPCS | Performed by: NURSE PRACTITIONER

## 2022-10-20 PROCEDURE — G0439 PPPS, SUBSEQ VISIT: HCPCS | Performed by: NURSE PRACTITIONER

## 2022-10-20 PROCEDURE — G8399 PT W/DXA RESULTS DOCUMENT: HCPCS | Performed by: NURSE PRACTITIONER

## 2022-10-20 PROCEDURE — G8427 DOCREV CUR MEDS BY ELIG CLIN: HCPCS | Performed by: NURSE PRACTITIONER

## 2022-10-20 PROCEDURE — G0008 ADMIN INFLUENZA VIRUS VAC: HCPCS | Performed by: NURSE PRACTITIONER

## 2022-10-20 PROCEDURE — G8417 CALC BMI ABV UP PARAM F/U: HCPCS | Performed by: NURSE PRACTITIONER

## 2022-10-20 PROCEDURE — 3017F COLORECTAL CA SCREEN DOC REV: CPT | Performed by: NURSE PRACTITIONER

## 2022-10-20 PROCEDURE — 3078F DIAST BP <80 MM HG: CPT | Performed by: NURSE PRACTITIONER

## 2022-10-20 PROCEDURE — 1123F ACP DISCUSS/DSCN MKR DOCD: CPT | Performed by: NURSE PRACTITIONER

## 2022-10-20 PROCEDURE — 90694 VACC AIIV4 NO PRSRV 0.5ML IM: CPT | Performed by: NURSE PRACTITIONER

## 2022-10-20 PROCEDURE — 3074F SYST BP LT 130 MM HG: CPT | Performed by: NURSE PRACTITIONER

## 2022-10-20 PROCEDURE — G8536 NO DOC ELDER MAL SCRN: HCPCS | Performed by: NURSE PRACTITIONER

## 2022-10-20 PROCEDURE — 1101F PT FALLS ASSESS-DOCD LE1/YR: CPT | Performed by: NURSE PRACTITIONER

## 2022-10-20 RX ORDER — ZOSTER VACCINE RECOMBINANT, ADJUVANTED 50 MCG/0.5
KIT INTRAMUSCULAR
Qty: 0.5 ML | Refills: 1 | Status: SHIPPED | OUTPATIENT
Start: 2022-10-20

## 2022-10-20 NOTE — ACP (ADVANCE CARE PLANNING)
Advance Care Planning     General Advance Care Planning (ACP) Conversation      Date of Conversation: 10/20/2022  Conducted with: Patient with Decision Making Capacity    Healthcare Decision Maker:     Primary Decision Maker: Sami Dhillon - [phone]  Click here to complete [address] including selection of the Healthcare Decision Maker Relationship (ie \"Primary\")    Content/Action Overview:   Has NO ACP documents/care preferences - information provided, considering goals and options  Reviewed DNR/DNI and patient elects Full Code (Attempt Resuscitation)    Length of Voluntary ACP Conversation in minutes:  <16 minutes (Non-Billable)    Ismael Melton NP

## 2022-10-20 NOTE — PATIENT INSTRUCTIONS

## 2022-10-20 NOTE — PROGRESS NOTES
This is the Subsequent Medicare Annual Wellness Exam, performed 12 months or more after the Initial AWV or the last Subsequent AWV    I have reviewed the patient's medical history in detail and updated the computerized patient record. Assessment/Plan   Education and counseling provided:  Are appropriate based on today's review and evaluation    1. Medicare annual wellness visit, subsequent  2. Essential hypertension, benign  3. Osteopenia of multiple sites  4. Pure hypercholesterolemia  5. Vitamin D deficiency  6. Encounter for immunization  -     INFLUENZA, FLUAD, (AGE 65 Y+), IM, PF, 0.5 ML  7. Encounter for screening mammogram for malignant neoplasm of breast  -     [facility] MAMMO BI SCREENING INCL CAD; Future     I have discussed the diagnosis with the patient and the intended plan as seen in the above orders. The patient has received an after-visit summary and questions were answered concerning future plans. I have discussed medication side effects and warnings with the patient as well. Patient agreeable with above plan and verbalizes understanding. Follow-up and Dispositions    Return in about 6 months (around 4/20/2023) for HTN/HLD, fasting labs 1 wk prior, in office follow up.        Depression Risk Factor Screening     3 most recent PHQ Screens 3/30/2022   PHQ Not Done -   Little interest or pleasure in doing things Not at all   Feeling down, depressed, irritable, or hopeless Not at all   Total Score PHQ 2 0   Trouble falling or staying asleep, or sleeping too much -   Feeling tired or having little energy -   Poor appetite, weight loss, or overeating -   Feeling bad about yourself - or that you are a failure or have let yourself or your family down -   Trouble concentrating on things such as school, work, reading, or watching TV -   Moving or speaking so slowly that other people could have noticed; or the opposite being so fidgety that others notice -   Thoughts of being better off dead, or hurting yourself in some way -   PHQ 9 Score -   How difficult have these problems made it for you to do your work, take care of your home and get along with others -       Alcohol & Drug Abuse Risk Screen    Do you average more than 1 drink per night or more than 7 drinks a week:  No    On any one occasion in the past three months have you have had more than 3 drinks containing alcohol:  No          Functional Ability and Level of Safety    Hearing: Hearing is good. Activities of Daily Living: The home contains: no safety equipment. Patient does total self care      Ambulation: with no difficulty     Fall Risk:  Fall Risk Assessment, last 12 mths 3/2/2022   Able to walk? Yes   Fall in past 12 months? 0   Do you feel unsteady? -   Are you worried about falling -   Is the gait abnormal? -   Number of falls in past 12 months -   Fall with injury?  -      Abuse Screen:  Patient is not abused     Cognitive Screening    Has your family/caregiver stated any concerns about your memory: no     Health Maintenance Due     Health Maintenance Due   Topic Date Due    Shingrix Vaccine Age 49> (1 of 2) Never done    Pneumococcal 65+ years (1 - PCV) Never done    COVID-19 Vaccine (3 - Booster for Barragan Ser series) 08/23/2021    Medicare Yearly Exam  09/22/2022     Patient Care Team   Patient Care Team:  Matias Gardner NP as PCP - General (Nurse Practitioner)  Matias Gardner NP as PCP - [facility] Empaneled Provider    History   Hypertension ROS: taking medications as instructed, no medication side effects noted, home BP monitoring in range of 211'M-546(DMO) systolic over 17'Q diastolic, no TIA's, no chest pain on exertion, no dyspnea on exertion, no swelling of ankles      Patient Active Problem List   Diagnosis Code    Essential hypertension, benign I10    Pure hypercholesterolemia E78.00    GERD (gastroesophageal reflux disease) K21.9    Vitamin D deficiency E55.9    PND (paroxysmal nocturnal dyspnea) R06.00    Pain in heel M79.673 Situational stress F43.9    Influenza vaccination declined Z28.21    23-polyvalent pneumococcal polysaccharide vaccine declined Z28.21    Overweight (BMI 25.0-29. 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 Den Chappell (March 2012)    Vaginitis atrophic 7-29-09    Vitamin D deficiency 4-2009      Past Surgical History:   Procedure Laterality Date    HX HYSTERECTOMY  1960's    [place] COLONOSCOPY FLX DX W/COLLJ SPEC WHEN PFRMD  2004    normal ; Dr. Cal Irene     Current Outpatient Medications   Medication Sig Dispense Refill    omeprazole (PriLOSEC OTC) 20 mg tablet Take 20 mg by mouth daily. chlorthalidone (HYGROTON) 25 mg tablet TAKE 1 TABLET EVERY DAY 90 Tablet 3    metoprolol succinate (TOPROL-XL) 100 mg tablet TAKE 1 TABLET EVERY DAY 90 Tablet 3    simvastatin (ZOCOR) 10 mg tablet TAKE 1 TABLET EVERY NIGHT 90 Tablet 3    diclofenac (VOLTAREN) 1 % gel Apply 2 g to affected area four (4) times daily. 100 g 0    potassium 99 mg tablet Take 99 mg by mouth daily. omega-3 fatty acids-vitamin e (FISH OIL) 1,000 mg Cap Take 1 Cap by mouth daily. Cholecalciferol, Vitamin D3, (VITAMIN D) 2,000 unit Cap Take  by mouth daily.        Allergies   Allergen Reactions    Naproxen Palpitations       Family History   Problem Relation Age of Onset    Hypertension Mother     OSTEOARTHRITIS Mother     Heart Attack Father     High Cholesterol Father     Hypertension Sister     High Cholesterol Sister     Breast Cancer Sister     Hypertension Brother      Social History     Tobacco Use    Smoking status: Former     Packs/day: 0.50     Years: 15.00     Pack years: 7.50     Types: Cigarettes     Quit date: 12/13/1979     Years since quitting: 42.8    Smokeless tobacco: Never   Substance Use Topics    Alcohol use: No     Review of Systems   Constitutional:  Negative for chills and fever. Respiratory:  Negative for shortness of breath. Cardiovascular:  Negative for chest pain and palpitations. Neurological:  Negative for dizziness and headaches. /80 (BP 1 Location: Right upper arm, BP Patient Position: Sitting, BP Cuff Size: Adult)   Pulse 76   Temp 97.6 °F (36.4 °C) (Temporal)   Resp 18   Ht 5' 3\" (1.6 m)   Wt 152 lb 3.2 oz (69 kg)   SpO2 98%   BMI 26.96 kg/m²     Physical Exam  Constitutional:       General: She is not in acute distress. Appearance: She is well-developed. HENT:      Head: Normocephalic and atraumatic. Neck:      Vascular: No carotid bruit. Cardiovascular:      Rate and Rhythm: Normal rate and regular rhythm. Heart sounds: Normal heart sounds. No murmur heard. No friction rub. No gallop. Pulmonary:      Effort: Pulmonary effort is normal.      Breath sounds: Normal breath sounds. No decreased breath sounds, wheezing, rhonchi or rales. Musculoskeletal:      Cervical back: Normal range of motion and neck supple. Lymphadenopathy:      Cervical: No cervical adenopathy. Skin:     General: Skin is warm and dry. Neurological:      Mental Status: She is alert and oriented to person, place, and time.        Ivan Orellana NP

## 2022-10-20 NOTE — PROGRESS NOTES
1. \"Have you been to the ER, urgent care clinic since your last visit? Hospitalized since your last visit? \" No    2. \"Have you seen or consulted any other health care providers outside of the [address] [place] [address] since your last visit? \" No     3. For patients aged 39-70: Has the patient had a colonoscopy / FIT/ Cologuard? Yes - Care Gap present. Most recent result on file      If the patient is female:    4. For patients aged 41-77: Has the patient had a mammogram within the past 2 years? NA - based on age or sex      11. For patients aged 21-65: Has the patient had a pap smear?  No

## 2022-11-09 ENCOUNTER — HOSPITAL ENCOUNTER (OUTPATIENT)
Dept: MAMMOGRAPHY | Age: 77
Discharge: HOME | End: 2022-11-09
Attending: NURSE PRACTITIONER
Payer: MEDICARE

## 2022-11-09 DIAGNOSIS — Z12.31 ENCOUNTER FOR SCREENING MAMMOGRAM FOR MALIGNANT NEOPLASM OF BREAST: ICD-10-CM

## 2022-11-09 PROCEDURE — 77063 BREAST TOMOSYNTHESIS BI: CPT

## 2022-11-09 PROCEDURE — 77067 SCR MAMMO BI INCL CAD: CPT

## 2023-01-17 ENCOUNTER — TELEPHONE (OUTPATIENT)
Dept: FAMILY MEDICINE CLINIC | Age: 78
End: 2023-01-17

## 2023-01-17 NOTE — TELEPHONE ENCOUNTER
Called patient back and let know sooner appointment has been made for 1-20-23 at 1240. Patient stated that would work better. Advised to check blood pressure BID, 1 hour after taking medication in the morning and again in the afternoon and is to keep a log of the readings. Advised if BP gets above 180/100, any SOb, CP or chest tightness to go to ER. Patient verbalized understanding. Did state BP today was 156/98.

## 2023-01-17 NOTE — TELEPHONE ENCOUNTER
Patient was transferred from [address] [place] [address] complaining of high blood pressure last reading was this morning 156/98 and at night the highest it's been is 200 patient stated she did go to [facility] for her blood pressure on 01/15/2023 and at discharged Bp was 169/72 and stated she was not given any medication for Bp patient has been scheduled for follow up appointment with provider on 01/25/23.

## 2023-01-20 ENCOUNTER — OFFICE VISIT (OUTPATIENT)
Dept: FAMILY MEDICINE CLINIC | Age: 78
End: 2023-01-20
Payer: MEDICARE

## 2023-01-20 VITALS
WEIGHT: 152 LBS | DIASTOLIC BLOOD PRESSURE: 105 MMHG | BODY MASS INDEX: 26.93 KG/M2 | RESPIRATION RATE: 16 BRPM | OXYGEN SATURATION: 98 % | HEART RATE: 86 BPM | TEMPERATURE: 97.9 F | HEIGHT: 63 IN | SYSTOLIC BLOOD PRESSURE: 158 MMHG

## 2023-01-20 DIAGNOSIS — I10 ESSENTIAL HYPERTENSION, BENIGN: Primary | ICD-10-CM

## 2023-01-20 DIAGNOSIS — K21.9 GASTROESOPHAGEAL REFLUX DISEASE WITHOUT ESOPHAGITIS: ICD-10-CM

## 2023-01-20 PROCEDURE — 3074F SYST BP LT 130 MM HG: CPT | Performed by: NURSE PRACTITIONER

## 2023-01-20 PROCEDURE — G8399 PT W/DXA RESULTS DOCUMENT: HCPCS | Performed by: NURSE PRACTITIONER

## 2023-01-20 PROCEDURE — G8536 NO DOC ELDER MAL SCRN: HCPCS | Performed by: NURSE PRACTITIONER

## 2023-01-20 PROCEDURE — G8417 CALC BMI ABV UP PARAM F/U: HCPCS | Performed by: NURSE PRACTITIONER

## 2023-01-20 PROCEDURE — 1101F PT FALLS ASSESS-DOCD LE1/YR: CPT | Performed by: NURSE PRACTITIONER

## 2023-01-20 PROCEDURE — G8427 DOCREV CUR MEDS BY ELIG CLIN: HCPCS | Performed by: NURSE PRACTITIONER

## 2023-01-20 PROCEDURE — 1090F PRES/ABSN URINE INCON ASSESS: CPT | Performed by: NURSE PRACTITIONER

## 2023-01-20 PROCEDURE — 99214 OFFICE O/P EST MOD 30 MIN: CPT | Performed by: NURSE PRACTITIONER

## 2023-01-20 PROCEDURE — 3017F COLORECTAL CA SCREEN DOC REV: CPT | Performed by: NURSE PRACTITIONER

## 2023-01-20 PROCEDURE — 1123F ACP DISCUSS/DSCN MKR DOCD: CPT | Performed by: NURSE PRACTITIONER

## 2023-01-20 PROCEDURE — 3078F DIAST BP <80 MM HG: CPT | Performed by: NURSE PRACTITIONER

## 2023-01-20 PROCEDURE — G8432 DEP SCR NOT DOC, RNG: HCPCS | Performed by: NURSE PRACTITIONER

## 2023-01-20 RX ORDER — AMLODIPINE BESYLATE 10 MG/1
10 TABLET ORAL DAILY
Qty: 90 TABLET | Refills: 0 | Status: SHIPPED | OUTPATIENT
Start: 2023-01-20

## 2023-01-20 RX ORDER — AMLODIPINE BESYLATE 10 MG/1
10 TABLET ORAL DAILY
Qty: 30 TABLET | Refills: 0 | Status: SHIPPED | OUTPATIENT
Start: 2023-01-20 | End: 2023-02-19

## 2023-01-20 RX ORDER — FAMOTIDINE 40 MG/1
40 TABLET, FILM COATED ORAL
Qty: 90 TABLET | Refills: 1 | Status: SHIPPED | OUTPATIENT
Start: 2023-01-20

## 2023-01-20 NOTE — PROGRESS NOTES
Abran Malhotra is a 78 y.o. female who was seen in clinic today (2023) for ED Follow-up      Assessment & Plan:   {There are no diagnoses linked to this encounter. (Refresh or delete this SmartLink)}      Subjective:   Patient states for the last few weeks she has been waking up in the middle of the night with discomfort. State she thinks that is related acid reflux. States  her blood pressure was elevated at home ? 220/120. Reports she did go to the [facility]) 2023. History of Present Illness   This is a 78 y.o. female presents from home for evaluation of elevated blood pressure. She states that she can tell that her blood pressure was elevated when she checked it today it was in the 215/120 range. She had taken her metoprolol 100 mg dose. She took it again it was still elevated so she took a dose of the left upper blood pressure medicine that starts with a C. Continued to be elevated and she was concerned and came in tonight. On questioning she does mention at timesthat she has had some discomfort in the center of her chest feels a little bit heavy sometimes it feels like it is in her arms its not exertional it comes and go she is not short of breath sweaty she not nauseated with it. Does not have it now. She states she has little bit of a frontal headache \"mild\" gradual onset no paresthesias or weakness. States she has been under a lot of stress her niece just  today at [facility] where she has been after overdose. She states she did take a baby aspirin this morning. INDEPENDENT HISTORIAN: Daughter is with her states symptoms seem to occur with stress anxiety. Did not have any discomfort in the waiting room. Impression and Management Plan   77-year-old female presents with concerns that her blood pressure is elevated has been elevated since this morning.   She did take an extra dose of her blood pressure medicine that she previously been on possibly Coreg 25 mg this morning. But is still continue to be elevated which is what prompted her to come to the emergency department social stressors certainly contributing to her pressure as the death of her niece today from an overdose. At this time she is neurologically intact. She is not feeling the chest discomfort at this time it seems to come and go. We will place her on blood pressure, pulse oximetry cardiac monitors. EKG baseline lab including CBC CMP troponin will be obtained an EKG with a headache and elevated blood pressure CTA will be obtained evaluate for acute intracranial process. We will trend her blood pressures and see what their were averaging and decide if we need to get something to help lower her pressure. Medical Decision Making   Lab work shows CBC shows a normal white count H&H normal limits no signs of anemia platelets are normal BMP shows normal renal function normal electrolytes initial troponin was 27 LFTs were within normal limits chest x-ray does not show any evidence of acute process EKG was also no acute process CT head showed some microvascular change but no acute. Patient has remained comfortable. She has not had any chest pain since he has been here. Her blood pressure is coming down on most recent check it is 174/96. Placed in ED status for delta troponin. We will continue to trend her blood pressures and see if they are continuing to come down. HEARTScore 4     Final Diagnosis          ICD-10-CM ICD-9-CM   1. Elevated blood pressure reading with diagnosis of hypertension  I10 401.9   2. Precordial pain  R07.2 786.51      States since she left the ED she has been taking chlorthalidone which was discontinued previously due to hypokalemia. States all week her blood pressure has been 150-160/90-99. She has had a systolic in the 254K.   Patient reports she hasn't been by her cardiologist, per patient she has been doing fine and didn't feel the need to go. Comments she just didn't feel well when she went to the ED    Reports as of late, she has not been adhering to a low sodium diet. States she has been eating a lot of soup she makes which is make with chicken broth(not low sodium), fried chicken, mac and cheese, etc.    States since her mother passed 1/30/22 she has not been able to get a handle on her eating. States since Sunday she has not had an appetite. Prior to she has been eating all day. Comments she thinks she may have some depression symptoms but doesn't want to take any medication. This year also makes the 3rd year of 's passing. Reports she did resume going to the gym last week to [facility]. Lab Results   Component Value Date/Time    Sodium 138 01/16/2023 03:01 AM    Potassium 3.7 01/16/2023 03:01 AM    Chloride 104 01/16/2023 03:01 AM    CO2 27 01/16/2023 03:01 AM    Anion gap 7 01/16/2023 03:01 AM    Glucose 91 01/16/2023 03:01 AM    BUN 10 01/16/2023 03:01 AM    Creatinine 1.00 01/16/2023 03:01 AM    BUN/Creatinine ratio 14 09/14/2022 08:02 AM    GFR est AA >60.0 01/16/2023 03:01 AM    GFR est non-AA 57 01/16/2023 03:01 AM    Calcium 9.2 01/16/2023 03:01 AM    Bilirubin, total 0.40 01/16/2023 03:01 AM    Alk.  phosphatase 63 01/16/2023 03:01 AM    Protein, total 7.4 01/16/2023 03:01 AM    Albumin 3.6 01/16/2023 03:01 AM    Globulin 3.4 10/18/2017 08:17 AM    A-G Ratio 1.4 09/14/2022 08:02 AM    ALT (SGPT) 8 (L) 01/16/2023 03:01 AM    AST (SGOT) 20.0 01/16/2023 03:01 AM     Lab Results   Component Value Date/Time    Cholesterol, total 200 (H) 09/14/2022 08:02 AM    HDL Cholesterol 55 09/14/2022 08:02 AM    LDL, calculated 135 (H) 09/14/2022 08:02 AM    LDL, calculated 143 (H) 06/23/2020 08:14 AM    VLDL, calculated 10 09/14/2022 08:02 AM    VLDL, calculated 12 06/23/2020 08:14 AM    Triglyceride 54 09/14/2022 08:02 AM    CHOL/HDL Ratio 3.3 04/25/2018 07:42 AM     No results found for: HBA1C, RKH2LTEI, ETH8IGYD  Lab Results   Component Value Date/Time    Vitamin D 25-Hydroxy 55.1 10/18/2017 08:17 AM    VITAMIN D, 25-HYDROXY 70.7 09/14/2022 08:02 AM       Lab Results   Component Value Date/Time    WBC 9.0 01/16/2023 03:01 AM    HGB 14.0 01/16/2023 03:01 AM    HCT 44.0 01/16/2023 03:01 AM    PLATELET 359 05/53/8186 03:01 AM    MCV 80.1 01/16/2023 03:01 AM       Wt Readings from Last 3 Encounters:   01/20/23 152 lb (68.9 kg)   01/16/23 155 lb (70.3 kg)   10/20/22 152 lb 3.2 oz (69 kg)     Temp Readings from Last 3 Encounters:   01/20/23 97.9 °F (36.6 °C) (Temporal)   01/16/23 98.1 °F (36.7 °C)   10/20/22 97.6 °F (36.4 °C) (Temporal)     BP Readings from Last 3 Encounters:   01/20/23 (!) 161/109   01/16/23 (!) 165/92   10/20/22 114/80     Pulse Readings from Last 3 Encounters:   01/20/23 86   01/16/23 65   10/20/22 76       Prior to Admission medications    Medication Sig Start Date End Date Taking? Authorizing Provider   varicella-zoster recombinant, PF, (Shingrix, PF,) 50 mcg/0.5 mL susr injection 0.5mL by IntraMUSCular route once now and then repeat in 2-6 months 10/20/22  Yes Timmy Doll NP   omeprazole (PRILOSEC OTC) 20 mg tablet Take 20 mg by mouth daily. Yes Provider, Historical   chlorthalidone (HYGROTON) 25 mg tablet TAKE 1 TABLET EVERY DAY 3/28/22  Yes Timmy Doll NP   metoprolol succinate (TOPROL-XL) 100 mg tablet TAKE 1 TABLET EVERY DAY 3/28/22  Yes Julio C DOWELL NP   simvastatin (ZOCOR) 10 mg tablet TAKE 1 TABLET EVERY NIGHT 3/28/22  Yes Julio C DOWELL NP   potassium 99 mg tablet Take 99 mg by mouth daily. Yes Provider, Historical   omega-3 fatty acids-vitamin e 1,000 mg cap Take 1 Cap by mouth daily. 4/6/10  Yes Provider, Historical   cholecalciferol (VITAMIN D3) (2,000 UNITS /50 MCG) cap capsule Take  by mouth daily. 8/11/10  Yes Provider, Historical   diclofenac (VOLTAREN) 1 % gel Apply 2 g to affected area four (4) times daily.   Patient not taking: No sig reported 9/21/21   Timmy Doll NP The following sections were reviewed & updated as appropriate: PMH, PSH, FH, and SH. Review of Systems   Constitutional:  Negative for activity change, appetite change, chills, fatigue and fever. Respiratory:  Negative for chest tightness and shortness of breath. Cardiovascular:  Negative for chest pain. Neurological:  Negative for dizziness and headaches. Objective:   Visit Vitals  BP (!) 161/109 (BP 1 Location: Left upper arm, BP Patient Position: Sitting)   Pulse 86   Temp 97.9 °F (36.6 °C) (Temporal)   Resp 16   Ht 5' 3\" (1.6 m)   Wt 152 lb (68.9 kg)   SpO2 98%   BMI 26.93 kg/m²      Physical Exam  Constitutional:       General: She is not in acute distress. Appearance: She is well-developed. HENT:      Head: Normocephalic and atraumatic. Neck:      Vascular: No carotid bruit. Cardiovascular:      Rate and Rhythm: Normal rate and regular rhythm. Heart sounds: Normal heart sounds. No murmur heard. No friction rub. No gallop. Pulmonary:      Effort: Pulmonary effort is normal.      Breath sounds: Normal breath sounds. No decreased breath sounds, wheezing, rhonchi or rales. Musculoskeletal:      Cervical back: Normal range of motion and neck supple. Lymphadenopathy:      Cervical: No cervical adenopathy. Skin:     General: Skin is warm and dry. Neurological:      Mental Status: She is alert and oriented to person, place, and time. Disclaimer: The patient understands our medical plan. Alternatives have been explained and offered. The risks, benefits and significant side effects of all medications have been reviewed. Anticipated time course and progression of condition reviewed. All questions have been addressed. She is encouraged to employ the information provided in the after visit summary, which was reviewed.       Where applicable, she is instructed to call the clinic if she has not been notified either by phone or through [address] with the results of her tests or with an appointment plan for any referrals within 1 week(s). No news is not good news; it's no news. The patient  is to call if her condition worsens or fails to improve or if significant side effects are experienced. Aspects of this note may have been generated using voice recognition software. Despite editing, there may be unrecognized errors.        Brigido Epley, NP

## 2023-01-20 NOTE — PROGRESS NOTES
Dieter Bernal presents today for   Chief Complaint   Patient presents with    Hospital Follow Up       Vitals:    01/20/23 1253 01/20/23 1258   BP: (!) 161/109 (!) 158/105   Pulse: 86    Resp: 16    Temp: 97.9 °F (36.6 °C)    TempSrc: Temporal    SpO2: 98%    Weight: 152 lb (68.9 kg)    Height: 5' 3\" (1.6 m)         Is someone accompanying this pt? no    Is the patient using any DME equipment during OV? no    Depression Screening:  3 most recent PHQ Screens 1/20/2023   PHQ Not Done -   Little interest or pleasure in doing things Not at all   Feeling down, depressed, irritable, or hopeless Not at all   Total Score PHQ 2 0   Trouble falling or staying asleep, or sleeping too much -   Feeling tired or having little energy -   Poor appetite, weight loss, or overeating -   Feeling bad about yourself - or that you are a failure or have let yourself or your family down -   Trouble concentrating on things such as school, work, reading, or watching TV -   Moving or speaking so slowly that other people could have noticed; or the opposite being so fidgety that others notice -   Thoughts of being better off dead, or hurting yourself in some way -   PHQ 9 Score -   How difficult have these problems made it for you to do your work, take care of your home and get along with others -       Learning Assessment:  Learning Assessment 1/16/2020   PRIMARY LEARNER Patient   HIGHEST LEVEL OF EDUCATION - PRIMARY LEARNER  GRADUATED HIGH SCHOOL OR GED   BARRIERS PRIMARY LEARNER NONE   CO-LEARNER CAREGIVER No   PRIMARY LANGUAGE ENGLISH   LEARNER PREFERENCE PRIMARY DEMONSTRATION   ANSWERED BY patient   RELATIONSHIP SELF       Abuse Screening:  Abuse Screening Questionnaire 10/20/2022   Do you ever feel afraid of your partner? N   Are you in a relationship with someone who physically or mentally threatens you? N   Is it safe for you to go home? Y       Fall Screening  Fall Risk Assessment, last 12 mths 1/20/2023   Able to walk?  Yes   Fall in past 12 months? 0   Do you feel unsteady? -   Are you worried about falling -   Is the gait abnormal? -   Number of falls in past 12 months -   Fall with injury? -       Generalized Anxiety  No flowsheet data found. Health Maintenance Due   Topic Date Due    Shingles Vaccine (1 of 2) Never done    Pneumococcal 65+ years (1 - PCV) Never done    COVID-19 Vaccine (4 - Booster for Moderna series) 09/23/2022   . Health Maintenance reviewed and discussed and ordered per Provider. Vaccines Due   Screenings Due       Damien Dexter is updated on all HM    1. \"Have you been to the ER, urgent care clinic since your last visit? Hospitalized since your last visit? \" Yes in chart    2. \"Have you seen or consulted any other health care providers outside of the [address] [place] [address] since your last visit? \" No     3. For patients aged 39-70: Has the patient had a colonoscopy / FIT/ Cologuard? Yes - no Care Gap present     If the patient is female:    4. For patients aged 41-77: Has the patient had a mammogram within the past 2 years? NA - based on age    11. For patients aged 21-65: Has the patient had a pap smear?  NA - based on age

## 2023-02-08 ENCOUNTER — CLINICAL SUPPORT (OUTPATIENT)
Dept: FAMILY MEDICINE CLINIC | Age: 78
End: 2023-02-08

## 2023-02-08 VITALS — DIASTOLIC BLOOD PRESSURE: 80 MMHG | SYSTOLIC BLOOD PRESSURE: 128 MMHG | HEART RATE: 73 BPM

## 2023-02-08 DIAGNOSIS — I10 ESSENTIAL HYPERTENSION: Primary | ICD-10-CM

## 2023-02-08 NOTE — PROGRESS NOTES
Patient came in office today for BP only. After sitting in quiet room for 10 minutes BP was 128/80 with HR of 73. Advised to continue to take medications and keep follow-up.

## 2023-03-02 NOTE — TELEPHONE ENCOUNTER
Last Appointment- 1/20/23    Next Appointment- 4/19/23    - N/A    Urine Drug Screen- N/A    Controlled Substance Agreement- N/A    Requested Prescriptions     Pending Prescriptions Disp Refills    chlorthalidone (HYGROTON) 25 MG tablet [Pharmacy Med Name: CHLORTHALIDONE 25 MG Tablet] 90 tablet      Sig: TAKE 1 TABLET EVERY DAY    simvastatin (ZOCOR) 10 MG tablet [Pharmacy Med Name: SIMVASTATIN 10 MG Tablet] 90 tablet      Sig: TAKE 1 TABLET EVERY NIGHT    metoprolol succinate (TOPROL XL) 100 MG extended release tablet [Pharmacy Med Name: METOPROLOL SUCCINATE  MG Tablet Extended Release 24 Hour] 90 tablet      Sig: TAKE 1 TABLET EVERY DAY

## 2023-03-03 RX ORDER — CHLORTHALIDONE 25 MG/1
TABLET ORAL
Qty: 90 TABLET | Refills: 3 | Status: SHIPPED | OUTPATIENT
Start: 2023-03-03

## 2023-03-03 RX ORDER — SIMVASTATIN 10 MG
TABLET ORAL
Qty: 90 TABLET | Refills: 3 | Status: SHIPPED | OUTPATIENT
Start: 2023-03-03

## 2023-03-03 RX ORDER — METOPROLOL SUCCINATE 100 MG/1
TABLET, EXTENDED RELEASE ORAL
Qty: 90 TABLET | Refills: 3 | Status: SHIPPED | OUTPATIENT
Start: 2023-03-03

## 2023-04-15 RX ORDER — AMLODIPINE BESYLATE 10 MG/1
TABLET ORAL
Qty: 90 TABLET | Refills: 3 | Status: SHIPPED | OUTPATIENT
Start: 2023-04-15

## 2023-04-19 ENCOUNTER — OFFICE VISIT (OUTPATIENT)
Age: 78
End: 2023-04-19
Payer: MEDICARE

## 2023-04-19 VITALS
SYSTOLIC BLOOD PRESSURE: 136 MMHG | HEIGHT: 63 IN | RESPIRATION RATE: 18 BRPM | HEART RATE: 71 BPM | TEMPERATURE: 98.2 F | WEIGHT: 158 LBS | OXYGEN SATURATION: 98 % | BODY MASS INDEX: 28 KG/M2 | DIASTOLIC BLOOD PRESSURE: 82 MMHG

## 2023-04-19 DIAGNOSIS — E55.9 VITAMIN D DEFICIENCY, UNSPECIFIED: ICD-10-CM

## 2023-04-19 DIAGNOSIS — I10 ESSENTIAL (PRIMARY) HYPERTENSION: Primary | ICD-10-CM

## 2023-04-19 DIAGNOSIS — E78.00 PURE HYPERCHOLESTEROLEMIA, UNSPECIFIED: ICD-10-CM

## 2023-04-19 PROCEDURE — G8399 PT W/DXA RESULTS DOCUMENT: HCPCS | Performed by: NURSE PRACTITIONER

## 2023-04-19 PROCEDURE — 3074F SYST BP LT 130 MM HG: CPT | Performed by: NURSE PRACTITIONER

## 2023-04-19 PROCEDURE — 3017F COLORECTAL CA SCREEN DOC REV: CPT | Performed by: NURSE PRACTITIONER

## 2023-04-19 PROCEDURE — 1123F ACP DISCUSS/DSCN MKR DOCD: CPT | Performed by: NURSE PRACTITIONER

## 2023-04-19 PROCEDURE — 99213 OFFICE O/P EST LOW 20 MIN: CPT

## 2023-04-19 PROCEDURE — 99214 OFFICE O/P EST MOD 30 MIN: CPT | Performed by: NURSE PRACTITIONER

## 2023-04-19 PROCEDURE — G8427 DOCREV CUR MEDS BY ELIG CLIN: HCPCS | Performed by: NURSE PRACTITIONER

## 2023-04-19 PROCEDURE — 3078F DIAST BP <80 MM HG: CPT | Performed by: NURSE PRACTITIONER

## 2023-04-19 PROCEDURE — 1090F PRES/ABSN URINE INCON ASSESS: CPT | Performed by: NURSE PRACTITIONER

## 2023-04-19 PROCEDURE — G8419 CALC BMI OUT NRM PARAM NOF/U: HCPCS | Performed by: NURSE PRACTITIONER

## 2023-04-19 PROCEDURE — 1036F TOBACCO NON-USER: CPT | Performed by: NURSE PRACTITIONER

## 2023-04-19 RX ORDER — FAMOTIDINE 40 MG/1
1 TABLET, FILM COATED ORAL NIGHTLY
COMMUNITY
Start: 2023-01-20 | End: 2023-04-19 | Stop reason: SDUPTHER

## 2023-04-19 RX ORDER — FAMOTIDINE 40 MG/1
40 TABLET, FILM COATED ORAL NIGHTLY PRN
Qty: 90 TABLET | Refills: 1 | Status: SHIPPED | OUTPATIENT
Start: 2023-04-19

## 2023-04-19 SDOH — ECONOMIC STABILITY: HOUSING INSECURITY
IN THE LAST 12 MONTHS, WAS THERE A TIME WHEN YOU DID NOT HAVE A STEADY PLACE TO SLEEP OR SLEPT IN A SHELTER (INCLUDING NOW)?: NO

## 2023-04-19 SDOH — ECONOMIC STABILITY: FOOD INSECURITY: WITHIN THE PAST 12 MONTHS, THE FOOD YOU BOUGHT JUST DIDN'T LAST AND YOU DIDN'T HAVE MONEY TO GET MORE.: NEVER TRUE

## 2023-04-19 SDOH — ECONOMIC STABILITY: FOOD INSECURITY: WITHIN THE PAST 12 MONTHS, YOU WORRIED THAT YOUR FOOD WOULD RUN OUT BEFORE YOU GOT MONEY TO BUY MORE.: NEVER TRUE

## 2023-04-19 SDOH — ECONOMIC STABILITY: INCOME INSECURITY: HOW HARD IS IT FOR YOU TO PAY FOR THE VERY BASICS LIKE FOOD, HOUSING, MEDICAL CARE, AND HEATING?: SOMEWHAT HARD

## 2023-04-19 ASSESSMENT — PATIENT HEALTH QUESTIONNAIRE - PHQ9
SUM OF ALL RESPONSES TO PHQ QUESTIONS 1-9: 0
1. LITTLE INTEREST OR PLEASURE IN DOING THINGS: 0
SUM OF ALL RESPONSES TO PHQ QUESTIONS 1-9: 0
2. FEELING DOWN, DEPRESSED OR HOPELESS: 0
SUM OF ALL RESPONSES TO PHQ9 QUESTIONS 1 & 2: 0

## 2023-04-19 ASSESSMENT — ENCOUNTER SYMPTOMS
CHEST TIGHTNESS: 0
SHORTNESS OF BREATH: 0

## 2023-08-14 ENCOUNTER — TELEPHONE (OUTPATIENT)
Age: 78
End: 2023-08-14

## 2023-08-14 NOTE — TELEPHONE ENCOUNTER
Pt experiencing pain and swelling behind left knee for abt a week, states had a fall last month and [facility] not sure if that has anything to do with it, scheduled next available, pt states that if unable to wait then will go back to [facility]

## 2023-08-15 NOTE — TELEPHONE ENCOUNTER
NP [facility] has been notified and patient has a pending appointment for evaluation 8/16/23.

## 2023-08-16 ENCOUNTER — OFFICE VISIT (OUTPATIENT)
Age: 78
End: 2023-08-16
Payer: MEDICARE

## 2023-08-16 ENCOUNTER — HOSPITAL ENCOUNTER (OUTPATIENT)
Facility: HOSPITAL | Age: 78
Discharge: HOME | End: 2023-08-19
Payer: MEDICARE

## 2023-08-16 VITALS
TEMPERATURE: 98.2 F | WEIGHT: 157.8 LBS | BODY MASS INDEX: 27.96 KG/M2 | OXYGEN SATURATION: 99 % | SYSTOLIC BLOOD PRESSURE: 138 MMHG | HEART RATE: 74 BPM | RESPIRATION RATE: 18 BRPM | DIASTOLIC BLOOD PRESSURE: 77 MMHG | HEIGHT: 63 IN

## 2023-08-16 DIAGNOSIS — M25.562 ACUTE PAIN OF LEFT KNEE: Primary | ICD-10-CM

## 2023-08-16 DIAGNOSIS — M25.562 PAIN AND SWELLING OF LEFT KNEE: ICD-10-CM

## 2023-08-16 DIAGNOSIS — M25.462 PAIN AND SWELLING OF LEFT KNEE: ICD-10-CM

## 2023-08-16 DIAGNOSIS — M25.562 ACUTE PAIN OF LEFT KNEE: ICD-10-CM

## 2023-08-16 PROCEDURE — G8399 PT W/DXA RESULTS DOCUMENT: HCPCS | Performed by: NURSE PRACTITIONER

## 2023-08-16 PROCEDURE — 3074F SYST BP LT 130 MM HG: CPT | Performed by: NURSE PRACTITIONER

## 2023-08-16 PROCEDURE — 3078F DIAST BP <80 MM HG: CPT | Performed by: NURSE PRACTITIONER

## 2023-08-16 PROCEDURE — G8427 DOCREV CUR MEDS BY ELIG CLIN: HCPCS | Performed by: NURSE PRACTITIONER

## 2023-08-16 PROCEDURE — 99213 OFFICE O/P EST LOW 20 MIN: CPT | Performed by: NURSE PRACTITIONER

## 2023-08-16 PROCEDURE — 1036F TOBACCO NON-USER: CPT | Performed by: NURSE PRACTITIONER

## 2023-08-16 PROCEDURE — 73564 X-RAY EXAM KNEE 4 OR MORE: CPT

## 2023-08-16 PROCEDURE — G8419 CALC BMI OUT NRM PARAM NOF/U: HCPCS | Performed by: NURSE PRACTITIONER

## 2023-08-16 PROCEDURE — 3017F COLORECTAL CA SCREEN DOC REV: CPT | Performed by: NURSE PRACTITIONER

## 2023-08-16 PROCEDURE — 1090F PRES/ABSN URINE INCON ASSESS: CPT | Performed by: NURSE PRACTITIONER

## 2023-08-16 PROCEDURE — 1123F ACP DISCUSS/DSCN MKR DOCD: CPT | Performed by: NURSE PRACTITIONER

## 2023-08-16 ASSESSMENT — PATIENT HEALTH QUESTIONNAIRE - PHQ9
SUM OF ALL RESPONSES TO PHQ QUESTIONS 1-9: 0
SUM OF ALL RESPONSES TO PHQ9 QUESTIONS 1 & 2: 0
2. FEELING DOWN, DEPRESSED OR HOPELESS: 0
1. LITTLE INTEREST OR PLEASURE IN DOING THINGS: 0
SUM OF ALL RESPONSES TO PHQ QUESTIONS 1-9: 0

## 2023-08-28 ENCOUNTER — HOSPITAL ENCOUNTER (OUTPATIENT)
Facility: HOSPITAL | Age: 78
Discharge: HOME | End: 2023-08-30
Payer: MEDICARE

## 2023-08-28 DIAGNOSIS — M25.462 PAIN AND SWELLING OF LEFT KNEE: ICD-10-CM

## 2023-08-28 DIAGNOSIS — M25.562 PAIN AND SWELLING OF LEFT KNEE: ICD-10-CM

## 2023-08-28 PROCEDURE — 93971 EXTREMITY STUDY: CPT

## 2023-08-28 PROCEDURE — 93971 EXTREMITY STUDY: CPT | Performed by: INTERNAL MEDICINE

## 2023-10-04 NOTE — TELEPHONE ENCOUNTER
Last appointment: 08/16/2023    Next appointment: 10/19/2023    Pharmacy requesting refill     metoprolol succinate (TOPROL XL) 100 MG extended release tablet [[id number]]       Pharmacy   [facility] [phone] N [phone] (Pharmacy

## 2023-10-12 RX ORDER — METOPROLOL SUCCINATE 100 MG/1
100 TABLET, EXTENDED RELEASE ORAL DAILY
Qty: 90 TABLET | Refills: 3 | Status: SHIPPED | OUTPATIENT
Start: 2023-10-12

## 2023-10-13 LAB
25(OH)D3+25(OH)D2 SERPL-MCNC: 54 NG/ML (ref 30–100)
ALBUMIN SERPL-MCNC: 3.8 G/DL (ref 3.8–4.8)
ALBUMIN/GLOB SERPL: 1.2 {RATIO} (ref 1.2–2.2)
ALP SERPL-CCNC: 69 IU/L (ref 44–121)
ALT SERPL-CCNC: 8 IU/L (ref 0–32)
AST SERPL-CCNC: 14 IU/L (ref 0–40)
BASOPHILS # BLD AUTO: 0 X10E3/UL (ref 0–0.2)
BASOPHILS NFR BLD AUTO: 1 %
BILIRUB SERPL-MCNC: 0.3 MG/DL (ref 0–1.2)
BUN SERPL-MCNC: 15 MG/DL (ref 8–27)
BUN/CREAT SERPL: 16 (ref 12–28)
CALCIUM SERPL-MCNC: 9.3 MG/DL (ref 8.7–10.3)
CHLORIDE SERPL-SCNC: 103 MMOL/L (ref 96–106)
CHOLEST SERPL-MCNC: 196 MG/DL (ref 100–199)
CO2 SERPL-SCNC: 25 MMOL/L (ref 20–29)
CREAT SERPL-MCNC: 0.96 MG/DL (ref 0.57–1)
EGFRCR SERPLBLD CKD-EPI 2021: 61 ML/MIN/1.73
EOSINOPHIL # BLD AUTO: 0.2 X10E3/UL (ref 0–0.4)
EOSINOPHIL NFR BLD AUTO: 3 %
ERYTHROCYTE [DISTWIDTH] IN BLOOD BY AUTOMATED COUNT: 13.9 % (ref 11.7–15.4)
GLOBULIN SER CALC-MCNC: 3.1 G/DL (ref 1.5–4.5)
GLUCOSE SERPL-MCNC: 80 MG/DL (ref 70–99)
HBA1C MFR BLD: 6.1 % (ref 4.8–5.6)
HCT VFR BLD AUTO: 42.9 % (ref 34–46.6)
HDLC SERPL-MCNC: 60 MG/DL
HGB BLD-MCNC: 13.4 G/DL (ref 11.1–15.9)
IMM GRANULOCYTES # BLD AUTO: 0 X10E3/UL (ref 0–0.1)
IMM GRANULOCYTES NFR BLD AUTO: 0 %
LDLC SERPL CALC-MCNC: 126 MG/DL (ref 0–99)
LYMPHOCYTES # BLD AUTO: 1.8 X10E3/UL (ref 0.7–3.1)
LYMPHOCYTES NFR BLD AUTO: 27 %
MCH RBC QN AUTO: 26.2 PG (ref 26.6–33)
MCHC RBC AUTO-ENTMCNC: 31.2 G/DL (ref 31.5–35.7)
MCV RBC AUTO: 84 FL (ref 79–97)
MONOCYTES # BLD AUTO: 0.4 X10E3/UL (ref 0.1–0.9)
MONOCYTES NFR BLD AUTO: 6 %
NEUTROPHILS # BLD AUTO: 4.3 X10E3/UL (ref 1.4–7)
NEUTROPHILS NFR BLD AUTO: 63 %
PLATELET # BLD AUTO: 311 X10E3/UL (ref 150–450)
POTASSIUM SERPL-SCNC: 4.3 MMOL/L (ref 3.5–5.2)
PROT SERPL-MCNC: 6.9 G/DL (ref 6–8.5)
RBC # BLD AUTO: 5.12 X10E6/UL (ref 3.77–5.28)
SODIUM SERPL-SCNC: 140 MMOL/L (ref 134–144)
SPECIMEN STATUS REPORT: NORMAL
TRIGL SERPL-MCNC: 55 MG/DL (ref 0–149)
VLDLC SERPL CALC-MCNC: 10 MG/DL (ref 5–40)
WBC # BLD AUTO: 6.6 X10E3/UL (ref 3.4–10.8)

## 2023-10-19 ENCOUNTER — OFFICE VISIT (OUTPATIENT)
Age: 78
End: 2023-10-19
Payer: MEDICARE

## 2023-10-19 VITALS
TEMPERATURE: 98.1 F | RESPIRATION RATE: 18 BRPM | BODY MASS INDEX: 27.75 KG/M2 | HEIGHT: 63 IN | WEIGHT: 156.6 LBS | SYSTOLIC BLOOD PRESSURE: 138 MMHG | OXYGEN SATURATION: 99 % | HEART RATE: 71 BPM | DIASTOLIC BLOOD PRESSURE: 83 MMHG

## 2023-10-19 DIAGNOSIS — R73.09 ELEVATED HEMOGLOBIN A1C: ICD-10-CM

## 2023-10-19 DIAGNOSIS — Z23 NEED FOR INFLUENZA VACCINATION: ICD-10-CM

## 2023-10-19 DIAGNOSIS — Z00.00 MEDICARE ANNUAL WELLNESS VISIT, SUBSEQUENT: Primary | ICD-10-CM

## 2023-10-19 PROCEDURE — 1123F ACP DISCUSS/DSCN MKR DOCD: CPT | Performed by: NURSE PRACTITIONER

## 2023-10-19 PROCEDURE — 3078F DIAST BP <80 MM HG: CPT | Performed by: NURSE PRACTITIONER

## 2023-10-19 PROCEDURE — PBSHW INFLUENZA, FLUAD, (AGE 65 Y+), IM, PF, 0.5 ML: Performed by: NURSE PRACTITIONER

## 2023-10-19 PROCEDURE — G0439 PPPS, SUBSEQ VISIT: HCPCS | Performed by: NURSE PRACTITIONER

## 2023-10-19 PROCEDURE — G8483 FLU IMM NO ADMIN DOC REA: HCPCS | Performed by: NURSE PRACTITIONER

## 2023-10-19 PROCEDURE — 3074F SYST BP LT 130 MM HG: CPT | Performed by: NURSE PRACTITIONER

## 2023-10-19 PROCEDURE — 90694 VACC AIIV4 NO PRSRV 0.5ML IM: CPT | Performed by: NURSE PRACTITIONER

## 2023-10-19 ASSESSMENT — PATIENT HEALTH QUESTIONNAIRE - PHQ9
SUM OF ALL RESPONSES TO PHQ QUESTIONS 1-9: 0
1. LITTLE INTEREST OR PLEASURE IN DOING THINGS: 0
SUM OF ALL RESPONSES TO PHQ QUESTIONS 1-9: 0
SUM OF ALL RESPONSES TO PHQ QUESTIONS 1-9: 0
SUM OF ALL RESPONSES TO PHQ9 QUESTIONS 1 & 2: 0
SUM OF ALL RESPONSES TO PHQ QUESTIONS 1-9: 0
2. FEELING DOWN, DEPRESSED OR HOPELESS: 0

## 2023-10-19 ASSESSMENT — LIFESTYLE VARIABLES: HOW MANY STANDARD DRINKS CONTAINING ALCOHOL DO YOU HAVE ON A TYPICAL DAY: PATIENT DOES NOT DRINK

## 2023-10-19 NOTE — PROGRESS NOTES
1. \"Have you been to the ER, urgent care clinic since your last visit? Hospitalized since your last visit? \" No    2. \"Have you seen or consulted any other health care providers outside of the [address] [place] [address] since your last visit? \" No     3. For patients aged 43-73: Has the patient had a colonoscopy / FIT/ Cologuard? NA - based on age      If the patient is female:    4. For patients aged 43-66: Has the patient had a mammogram within the past 2 years? NA - based on age or sex      11. For patients aged 21-65: Has the patient had a pap smear?  NA - based on age or sex

## 2023-11-14 ENCOUNTER — HOSPITAL ENCOUNTER (OUTPATIENT)
Facility: HOSPITAL | Age: 78
Discharge: HOME | End: 2023-11-17
Payer: MEDICARE

## 2023-11-14 VITALS — BODY MASS INDEX: 26.63 KG/M2 | WEIGHT: 156 LBS | HEIGHT: 64 IN

## 2023-11-14 DIAGNOSIS — Z12.31 VISIT FOR SCREENING MAMMOGRAM: ICD-10-CM

## 2023-11-14 PROCEDURE — 77063 BREAST TOMOSYNTHESIS BI: CPT

## 2024-02-20 ENCOUNTER — OFFICE VISIT (OUTPATIENT)
Age: 79
End: 2024-02-20
Payer: MEDICARE

## 2024-02-20 VITALS
DIASTOLIC BLOOD PRESSURE: 80 MMHG | HEIGHT: 64 IN | WEIGHT: 148.4 LBS | BODY MASS INDEX: 25.33 KG/M2 | TEMPERATURE: 97.3 F | HEART RATE: 66 BPM | SYSTOLIC BLOOD PRESSURE: 148 MMHG

## 2024-02-20 DIAGNOSIS — E55.9 VITAMIN D DEFICIENCY, UNSPECIFIED: ICD-10-CM

## 2024-02-20 DIAGNOSIS — I10 ESSENTIAL (PRIMARY) HYPERTENSION: ICD-10-CM

## 2024-02-20 DIAGNOSIS — E78.00 PURE HYPERCHOLESTEROLEMIA, UNSPECIFIED: ICD-10-CM

## 2024-02-20 DIAGNOSIS — R73.09 ELEVATED HEMOGLOBIN A1C: Primary | ICD-10-CM

## 2024-02-20 LAB — HBA1C MFR BLD: 5.9 %

## 2024-02-20 PROCEDURE — 99213 OFFICE O/P EST LOW 20 MIN: CPT | Performed by: NURSE PRACTITIONER

## 2024-02-20 PROCEDURE — G8419 CALC BMI OUT NRM PARAM NOF/U: HCPCS | Performed by: NURSE PRACTITIONER

## 2024-02-20 PROCEDURE — 3078F DIAST BP <80 MM HG: CPT | Performed by: NURSE PRACTITIONER

## 2024-02-20 PROCEDURE — PBSHW AMB POC HEMOGLOBIN A1C: Performed by: NURSE PRACTITIONER

## 2024-02-20 PROCEDURE — G8483 FLU IMM NO ADMIN DOC REA: HCPCS | Performed by: NURSE PRACTITIONER

## 2024-02-20 PROCEDURE — G8427 DOCREV CUR MEDS BY ELIG CLIN: HCPCS | Performed by: NURSE PRACTITIONER

## 2024-02-20 PROCEDURE — 3077F SYST BP >= 140 MM HG: CPT | Performed by: NURSE PRACTITIONER

## 2024-02-20 PROCEDURE — 1036F TOBACCO NON-USER: CPT | Performed by: NURSE PRACTITIONER

## 2024-02-20 PROCEDURE — G8399 PT W/DXA RESULTS DOCUMENT: HCPCS | Performed by: NURSE PRACTITIONER

## 2024-02-20 PROCEDURE — 1123F ACP DISCUSS/DSCN MKR DOCD: CPT | Performed by: NURSE PRACTITIONER

## 2024-02-20 PROCEDURE — 1090F PRES/ABSN URINE INCON ASSESS: CPT | Performed by: NURSE PRACTITIONER

## 2024-02-20 PROCEDURE — 83036 HEMOGLOBIN GLYCOSYLATED A1C: CPT | Performed by: NURSE PRACTITIONER

## 2024-02-20 ASSESSMENT — PATIENT HEALTH QUESTIONNAIRE - PHQ9
2. FEELING DOWN, DEPRESSED OR HOPELESS: 0
SUM OF ALL RESPONSES TO PHQ QUESTIONS 1-9: 0
1. LITTLE INTEREST OR PLEASURE IN DOING THINGS: 0
SUM OF ALL RESPONSES TO PHQ9 QUESTIONS 1 & 2: 0

## 2024-02-20 ASSESSMENT — ENCOUNTER SYMPTOMS
SHORTNESS OF BREATH: 0
CHEST TIGHTNESS: 0

## 2024-02-20 NOTE — PROGRESS NOTES
1. \"Have you been to the ER, urgent care clinic since your last visit?  Hospitalized since your last visit?\" No    2. \"Have you seen or consulted any other health care providers outside of the [facility] since your last visit?\" No     3. For patients aged 45-75: Has the patient had a colonoscopy / FIT/ Cologuard? NA - based on age      If the patient is female:    4. For patients aged 40-74: Has the patient had a mammogram within the past 2 years? NA - based on age or sex      5. For patients aged 21-65: Has the patient had a pap smear? NA - based on age or sex  
chest pain and palpitations.   Neurological:  Negative for dizziness and headaches.      Objective:   BP (!) 148/80 (Site: Right Upper Arm, Position: Sitting, Cuff Size: Medium Adult)   Pulse 66   Temp 97.3 °F (36.3 °C) (Temporal)   Ht 1.62 m (5' 3.78\")   Wt 67.3 kg (148 lb 6.4 oz)   BMI 25.65 kg/m²      Physical Exam  Constitutional:       Appearance: Normal appearance.   HENT:      Head: Normocephalic and atraumatic.   Cardiovascular:      Rate and Rhythm: Normal rate and regular rhythm.      Heart sounds: Normal heart sounds. No murmur heard.     No gallop.   Pulmonary:      Effort: Pulmonary effort is normal.      Breath sounds: Normal breath sounds. No wheezing, rhonchi or rales.   Musculoskeletal:         General: Normal range of motion.      Cervical back: Normal range of motion and neck supple.   Skin:     General: Skin is warm.   Neurological:      General: No focal deficit present.      Mental Status: She is alert. Mental status is at baseline.       Disclaimer:    The patient understands our medical plan.  Alternatives have been explained and offered.  The risks, benefits and significant side effects of all medications have been reviewed. Anticipated time course and progression of condition reviewed. All questions have been addressed.  She is encouraged to employ the information provided in the after visit summary, which was reviewed.      Where applicable, she is instructed to call the clinic if she has not been notified either by phone or through [employer] with the results of her tests or with an appointment plan for any referrals within 1 week(s).  No news is not good news; it's no news. The patient  is to call if her condition worsens or fails to improve or if significant side effects are experienced.     Aspects of this note may have been generated using voice recognition software. Despite editing, there may be unrecognized errors.       Brooke Cintron, APRN - NP   An electronic signature was used

## 2024-02-21 LAB
25(OH)D3+25(OH)D2 SERPL-MCNC: 58.1 NG/ML (ref 30–100)
ALBUMIN SERPL-MCNC: 4.2 G/DL (ref 3.8–4.8)
ALBUMIN/GLOB SERPL: 1.6 {RATIO} (ref 1.2–2.2)
ALP SERPL-CCNC: 73 IU/L (ref 44–121)
ALT SERPL-CCNC: 8 IU/L (ref 0–32)
AST SERPL-CCNC: 16 IU/L (ref 0–40)
BASOPHILS # BLD AUTO: 0 X10E3/UL (ref 0–0.2)
BASOPHILS NFR BLD AUTO: 1 %
BILIRUB SERPL-MCNC: 0.3 MG/DL (ref 0–1.2)
BUN SERPL-MCNC: 18 MG/DL (ref 8–27)
BUN/CREAT SERPL: 20 (ref 12–28)
CALCIUM SERPL-MCNC: 9.5 MG/DL (ref 8.7–10.3)
CHLORIDE SERPL-SCNC: 103 MMOL/L (ref 96–106)
CHOLEST SERPL-MCNC: 206 MG/DL (ref 100–199)
CO2 SERPL-SCNC: 19 MMOL/L (ref 20–29)
CREAT SERPL-MCNC: 0.9 MG/DL (ref 0.57–1)
EGFRCR SERPLBLD CKD-EPI 2021: 66 ML/MIN/1.73
EOSINOPHIL # BLD AUTO: 0.2 X10E3/UL (ref 0–0.4)
EOSINOPHIL NFR BLD AUTO: 2 %
ERYTHROCYTE [DISTWIDTH] IN BLOOD BY AUTOMATED COUNT: 13.5 % (ref 11.7–15.4)
GLOBULIN SER CALC-MCNC: 2.7 G/DL (ref 1.5–4.5)
GLUCOSE SERPL-MCNC: 80 MG/DL (ref 70–99)
HBA1C MFR BLD: 6 % (ref 4.8–5.6)
HCT VFR BLD AUTO: 42.3 % (ref 34–46.6)
HDLC SERPL-MCNC: 57 MG/DL
HGB BLD-MCNC: 13.5 G/DL (ref 11.1–15.9)
IMM GRANULOCYTES # BLD AUTO: 0 X10E3/UL (ref 0–0.1)
IMM GRANULOCYTES NFR BLD AUTO: 0 %
LDLC SERPL CALC-MCNC: 141 MG/DL (ref 0–99)
LYMPHOCYTES # BLD AUTO: 1.7 X10E3/UL (ref 0.7–3.1)
LYMPHOCYTES NFR BLD AUTO: 25 %
MCH RBC QN AUTO: 26 PG (ref 26.6–33)
MCHC RBC AUTO-ENTMCNC: 31.9 G/DL (ref 31.5–35.7)
MCV RBC AUTO: 81 FL (ref 79–97)
MONOCYTES # BLD AUTO: 0.5 X10E3/UL (ref 0.1–0.9)
MONOCYTES NFR BLD AUTO: 7 %
NEUTROPHILS # BLD AUTO: 4.4 X10E3/UL (ref 1.4–7)
NEUTROPHILS NFR BLD AUTO: 65 %
PLATELET # BLD AUTO: 324 X10E3/UL (ref 150–450)
POTASSIUM SERPL-SCNC: 4.2 MMOL/L (ref 3.5–5.2)
PROT SERPL-MCNC: 6.9 G/DL (ref 6–8.5)
RBC # BLD AUTO: 5.2 X10E6/UL (ref 3.77–5.28)
SODIUM SERPL-SCNC: 140 MMOL/L (ref 134–144)
SPECIMEN STATUS REPORT: NORMAL
TRIGL SERPL-MCNC: 46 MG/DL (ref 0–149)
VLDLC SERPL CALC-MCNC: 8 MG/DL (ref 5–40)
WBC # BLD AUTO: 6.8 X10E3/UL (ref 3.4–10.8)

## 2024-05-03 RX ORDER — AMLODIPINE BESYLATE 10 MG/1
TABLET ORAL
Qty: 90 TABLET | Refills: 3 | Status: SHIPPED | OUTPATIENT
Start: 2024-05-03

## 2024-05-03 RX ORDER — METOPROLOL SUCCINATE 100 MG/1
100 TABLET, EXTENDED RELEASE ORAL DAILY
Qty: 90 TABLET | Refills: 3 | Status: SHIPPED | OUTPATIENT
Start: 2024-05-03

## 2024-10-17 LAB
25(OH)D3+25(OH)D2 SERPL-MCNC: 60.3 NG/ML (ref 30–100)
ALBUMIN SERPL-MCNC: 4.1 G/DL (ref 3.8–4.8)
ALP SERPL-CCNC: 70 IU/L (ref 44–121)
ALT SERPL-CCNC: 11 IU/L (ref 0–32)
AST SERPL-CCNC: 16 IU/L (ref 0–40)
BASOPHILS # BLD AUTO: 0 X10E3/UL (ref 0–0.2)
BASOPHILS NFR BLD AUTO: 1 %
BILIRUB SERPL-MCNC: 0.4 MG/DL (ref 0–1.2)
BUN SERPL-MCNC: 11 MG/DL (ref 8–27)
BUN/CREAT SERPL: 12 (ref 12–28)
CALCIUM SERPL-MCNC: 9.6 MG/DL (ref 8.7–10.3)
CHLORIDE SERPL-SCNC: 101 MMOL/L (ref 96–106)
CHOLEST SERPL-MCNC: 176 MG/DL (ref 100–199)
CO2 SERPL-SCNC: 24 MMOL/L (ref 20–29)
CREAT SERPL-MCNC: 0.95 MG/DL (ref 0.57–1)
EGFRCR SERPLBLD CKD-EPI 2021: 62 ML/MIN/1.73
EOSINOPHIL # BLD AUTO: 0.2 X10E3/UL (ref 0–0.4)
EOSINOPHIL NFR BLD AUTO: 2 %
ERYTHROCYTE [DISTWIDTH] IN BLOOD BY AUTOMATED COUNT: 13.6 % (ref 11.7–15.4)
GLOBULIN SER CALC-MCNC: 2.9 G/DL (ref 1.5–4.5)
GLUCOSE SERPL-MCNC: 75 MG/DL (ref 70–99)
HBA1C MFR BLD: 6.2 % (ref 4.8–5.6)
HCT VFR BLD AUTO: 44.7 % (ref 34–46.6)
HDLC SERPL-MCNC: 61 MG/DL
HGB BLD-MCNC: 13.5 G/DL (ref 11.1–15.9)
IMM GRANULOCYTES # BLD AUTO: 0 X10E3/UL (ref 0–0.1)
IMM GRANULOCYTES NFR BLD AUTO: 0 %
LDLC SERPL CALC-MCNC: 106 MG/DL (ref 0–99)
LYMPHOCYTES # BLD AUTO: 1.9 X10E3/UL (ref 0.7–3.1)
LYMPHOCYTES NFR BLD AUTO: 26 %
MCH RBC QN AUTO: 25.7 PG (ref 26.6–33)
MCHC RBC AUTO-ENTMCNC: 30.2 G/DL (ref 31.5–35.7)
MCV RBC AUTO: 85 FL (ref 79–97)
MONOCYTES # BLD AUTO: 0.6 X10E3/UL (ref 0.1–0.9)
MONOCYTES NFR BLD AUTO: 8 %
NEUTROPHILS # BLD AUTO: 4.6 X10E3/UL (ref 1.4–7)
NEUTROPHILS NFR BLD AUTO: 63 %
PLATELET # BLD AUTO: 349 X10E3/UL (ref 150–450)
POTASSIUM SERPL-SCNC: 4.4 MMOL/L (ref 3.5–5.2)
PROT SERPL-MCNC: 7 G/DL (ref 6–8.5)
RBC # BLD AUTO: 5.25 X10E6/UL (ref 3.77–5.28)
SODIUM SERPL-SCNC: 140 MMOL/L (ref 134–144)
SPECIMEN STATUS REPORT: NORMAL
TRIGL SERPL-MCNC: 42 MG/DL (ref 0–149)
VLDLC SERPL CALC-MCNC: 9 MG/DL (ref 5–40)
WBC # BLD AUTO: 7.3 X10E3/UL (ref 3.4–10.8)

## 2024-10-23 ENCOUNTER — OFFICE VISIT (OUTPATIENT)
Facility: CLINIC | Age: 79
End: 2024-10-23
Payer: MEDICARE

## 2024-10-23 VITALS
DIASTOLIC BLOOD PRESSURE: 77 MMHG | SYSTOLIC BLOOD PRESSURE: 138 MMHG | HEIGHT: 64 IN | HEART RATE: 68 BPM | OXYGEN SATURATION: 98 % | BODY MASS INDEX: 25.51 KG/M2 | TEMPERATURE: 98 F | RESPIRATION RATE: 16 BRPM | WEIGHT: 149.4 LBS

## 2024-10-23 DIAGNOSIS — K21.9 GASTROESOPHAGEAL REFLUX DISEASE WITHOUT ESOPHAGITIS: ICD-10-CM

## 2024-10-23 DIAGNOSIS — Z23 NEED FOR INFLUENZA VACCINATION: ICD-10-CM

## 2024-10-23 DIAGNOSIS — Z12.31 SCREENING MAMMOGRAM FOR BREAST CANCER: ICD-10-CM

## 2024-10-23 DIAGNOSIS — I10 ESSENTIAL HYPERTENSION, BENIGN: ICD-10-CM

## 2024-10-23 DIAGNOSIS — Z00.00 MEDICARE ANNUAL WELLNESS VISIT, SUBSEQUENT: Primary | ICD-10-CM

## 2024-10-23 DIAGNOSIS — E55.9 VITAMIN D DEFICIENCY: ICD-10-CM

## 2024-10-23 DIAGNOSIS — E78.00 PURE HYPERCHOLESTEROLEMIA: ICD-10-CM

## 2024-10-23 PROCEDURE — G0008 ADMIN INFLUENZA VIRUS VAC: HCPCS | Performed by: NURSE PRACTITIONER

## 2024-10-23 PROCEDURE — 1159F MED LIST DOCD IN RCRD: CPT | Performed by: NURSE PRACTITIONER

## 2024-10-23 PROCEDURE — 3078F DIAST BP <80 MM HG: CPT | Performed by: NURSE PRACTITIONER

## 2024-10-23 PROCEDURE — 1160F RVW MEDS BY RX/DR IN RCRD: CPT | Performed by: NURSE PRACTITIONER

## 2024-10-23 PROCEDURE — G0439 PPPS, SUBSEQ VISIT: HCPCS | Performed by: NURSE PRACTITIONER

## 2024-10-23 PROCEDURE — 1126F AMNT PAIN NOTED NONE PRSNT: CPT | Performed by: NURSE PRACTITIONER

## 2024-10-23 PROCEDURE — 90653 IIV ADJUVANT VACCINE IM: CPT | Performed by: NURSE PRACTITIONER

## 2024-10-23 PROCEDURE — 1123F ACP DISCUSS/DSCN MKR DOCD: CPT | Performed by: NURSE PRACTITIONER

## 2024-10-23 PROCEDURE — G8482 FLU IMMUNIZE ORDER/ADMIN: HCPCS | Performed by: NURSE PRACTITIONER

## 2024-10-23 PROCEDURE — 3075F SYST BP GE 130 - 139MM HG: CPT | Performed by: NURSE PRACTITIONER

## 2024-10-23 SDOH — ECONOMIC STABILITY: INCOME INSECURITY: HOW HARD IS IT FOR YOU TO PAY FOR THE VERY BASICS LIKE FOOD, HOUSING, MEDICAL CARE, AND HEATING?: NOT HARD AT ALL

## 2024-10-23 SDOH — ECONOMIC STABILITY: FOOD INSECURITY: WITHIN THE PAST 12 MONTHS, YOU WORRIED THAT YOUR FOOD WOULD RUN OUT BEFORE YOU GOT MONEY TO BUY MORE.: NEVER TRUE

## 2024-10-23 SDOH — ECONOMIC STABILITY: FOOD INSECURITY: WITHIN THE PAST 12 MONTHS, THE FOOD YOU BOUGHT JUST DIDN'T LAST AND YOU DIDN'T HAVE MONEY TO GET MORE.: NEVER TRUE

## 2024-10-23 ASSESSMENT — PATIENT HEALTH QUESTIONNAIRE - PHQ9
1. LITTLE INTEREST OR PLEASURE IN DOING THINGS: NOT AT ALL
2. FEELING DOWN, DEPRESSED OR HOPELESS: NOT AT ALL
SUM OF ALL RESPONSES TO PHQ QUESTIONS 1-9: 0
SUM OF ALL RESPONSES TO PHQ9 QUESTIONS 1 & 2: 0
SUM OF ALL RESPONSES TO PHQ QUESTIONS 1-9: 0

## 2024-10-23 ASSESSMENT — LIFESTYLE VARIABLES
HOW OFTEN DO YOU HAVE A DRINK CONTAINING ALCOHOL: NEVER
HOW MANY STANDARD DRINKS CONTAINING ALCOHOL DO YOU HAVE ON A TYPICAL DAY: PATIENT DOES NOT DRINK

## 2024-10-23 NOTE — PROGRESS NOTES
Medicare Annual Wellness Visit    Taryn Carrillo is here for Medicare AWV    Assessment & Plan  1. Annual Medicare wellness visit.  Her vitamin D levels are satisfactory. The slight increase in her A1c could be attributed to her dietary habits. Her cholesterol levels have significantly improved. An increase in bone density was noted from the previous test conducted two years ago. She was advised to incorporate steel-cut oats into her diet. A mammogram was ordered for her. A bone density test will be scheduled for the following year.    Follow-up  A follow-up visit is scheduled in 6 months.  Medicare annual wellness visit, subsequent  Essential hypertension, benign  Gastroesophageal reflux disease without esophagitis  Pure hypercholesterolemia  Vitamin D deficiency  Need for influenza vaccination  -     Influenza, FLUAD Trivalent, (age 65 y+), IM, Preservative Free, 0.5mL  Screening mammogram for breast cancer    Orders Placed This Encounter    Influenza, FLUAD Trivalent, (age 65 y+), IM, Preservative Free, 0.5mL    omeprazole (PRILOSEC) 20 MG delayed release capsule     Sig: Take 1 capsule by mouth every morning (before breakfast)     Recommendations for Preventive Services Due: see orders and patient instructions/AVS.  Recommended screening schedule for the next 5-10 years is provided to the patient in written form: see Patient Instructions/AVS.  orders and [facility], lab results reviewed with patient  I have discussed the diagnosis with the patient and the intended plan as seen in the above orders.  The patient has received an after-visit summary and questions were answered concerning future plans.  I have discussed medication side effects and warnings with the patient as well. Patient agreeable with above plan and verbalizes understanding.    Return in about 6 months (around 4/23/2025) for HTN/HLD, Prediabetes, fasting labs 1 week prior, in office ONLY.       Subjective   History of

## 2024-10-23 NOTE — PROGRESS NOTES
\"Have you been to the ER, urgent care clinic since your last visit?  Hospitalized since your last visit?\"    YES - When: approximately During the summer ago.  Where and Why: [facility]    “Have you seen or consulted any other health care providers outside our system since your last visit?”    NO

## 2024-11-07 ENCOUNTER — TELEPHONE (OUTPATIENT)
Facility: CLINIC | Age: 79
End: 2024-11-07

## 2024-11-07 NOTE — TELEPHONE ENCOUNTER
Patient came in office to drop off form to be completed by MARK Cintron for her new medical insurance. Form has been scanned into patient's chart and placed in provider's sign box. Form will need to be faxed back to the number provider on the front page.

## 2024-11-14 ENCOUNTER — TELEPHONE (OUTPATIENT)
Facility: CLINIC | Age: 79
End: 2024-11-14

## 2024-11-18 ENCOUNTER — HOSPITAL ENCOUNTER (OUTPATIENT)
Facility: HOSPITAL | Age: 79
Discharge: HOME | End: 2024-11-21
Payer: MEDICARE

## 2024-11-18 DIAGNOSIS — Z12.31 SCREENING MAMMOGRAM FOR BREAST CANCER: ICD-10-CM

## 2024-11-18 PROCEDURE — 77063 BREAST TOMOSYNTHESIS BI: CPT

## 2025-01-13 ENCOUNTER — TELEPHONE (OUTPATIENT)
Facility: CLINIC | Age: 80
End: 2025-01-13

## 2025-01-13 NOTE — TELEPHONE ENCOUNTER
Patient called to request Refill     Last Appointment: 10/23/2024    Next Appointment: 04/23/2025    Medication:     amLODIPine (NORVASC) 10 MG tablet [[id number]]     simvastatin (ZOCOR) 10 MG tablet [[id number]]     metoprolol succinate (TOPROL XL) 100 MG extended release tablet [[id number]]     Pharmacy:   [facility], [address] [phone] - F [phone] [[id number]]

## 2025-01-21 RX ORDER — SIMVASTATIN 10 MG
10 TABLET ORAL NIGHTLY
Qty: 90 TABLET | Refills: 3 | Status: SHIPPED | OUTPATIENT
Start: 2025-01-21

## 2025-01-21 RX ORDER — METOPROLOL SUCCINATE 100 MG/1
100 TABLET, EXTENDED RELEASE ORAL DAILY
Qty: 90 TABLET | Refills: 3 | Status: SHIPPED | OUTPATIENT
Start: 2025-01-21

## 2025-01-21 RX ORDER — AMLODIPINE BESYLATE 10 MG/1
10 TABLET ORAL DAILY
Qty: 90 TABLET | Refills: 3 | Status: SHIPPED | OUTPATIENT
Start: 2025-01-21

## 2025-04-16 ENCOUNTER — LAB (OUTPATIENT)
Facility: CLINIC | Age: 80
End: 2025-04-16

## 2025-04-16 DIAGNOSIS — M85.89 OSTEOPENIA OF MULTIPLE SITES: ICD-10-CM

## 2025-04-16 DIAGNOSIS — E78.00 PURE HYPERCHOLESTEROLEMIA: ICD-10-CM

## 2025-04-16 DIAGNOSIS — I10 ESSENTIAL HYPERTENSION, BENIGN: Primary | ICD-10-CM

## 2025-04-16 DIAGNOSIS — R73.09 ELEVATED HEMOGLOBIN A1C: ICD-10-CM

## 2025-04-16 DIAGNOSIS — I10 ESSENTIAL HYPERTENSION, BENIGN: ICD-10-CM

## 2025-04-16 DIAGNOSIS — E55.9 VITAMIN D DEFICIENCY: ICD-10-CM

## 2025-04-17 LAB
25(OH)D3+25(OH)D2 SERPL-MCNC: 52.2 NG/ML (ref 30–100)
ALBUMIN SERPL-MCNC: 3.9 G/DL (ref 3.8–4.8)
ALP SERPL-CCNC: 75 IU/L (ref 44–121)
ALT SERPL-CCNC: 11 IU/L (ref 0–32)
AST SERPL-CCNC: 18 IU/L (ref 0–40)
BASOPHILS # BLD AUTO: 0.1 X10E3/UL (ref 0–0.2)
BASOPHILS NFR BLD AUTO: 1 %
BILIRUB SERPL-MCNC: 0.3 MG/DL (ref 0–1.2)
BUN SERPL-MCNC: 16 MG/DL (ref 8–27)
BUN/CREAT SERPL: 16 (ref 12–28)
CALCIUM SERPL-MCNC: 9.7 MG/DL (ref 8.7–10.3)
CHLORIDE SERPL-SCNC: 103 MMOL/L (ref 96–106)
CHOLEST SERPL-MCNC: 167 MG/DL (ref 100–199)
CO2 SERPL-SCNC: 25 MMOL/L (ref 20–29)
CREAT SERPL-MCNC: 0.98 MG/DL (ref 0.57–1)
EGFRCR SERPLBLD CKD-EPI 2021: 59 ML/MIN/1.73
EOSINOPHIL # BLD AUTO: 0.2 X10E3/UL (ref 0–0.4)
EOSINOPHIL NFR BLD AUTO: 2 %
ERYTHROCYTE [DISTWIDTH] IN BLOOD BY AUTOMATED COUNT: 13.6 % (ref 11.7–15.4)
GLOBULIN SER CALC-MCNC: 2.9 G/DL (ref 1.5–4.5)
GLUCOSE SERPL-MCNC: 76 MG/DL (ref 70–99)
HBA1C MFR BLD: 6 % (ref 4.8–5.6)
HCT VFR BLD AUTO: 43 % (ref 34–46.6)
HDLC SERPL-MCNC: 51 MG/DL
HGB BLD-MCNC: 13.5 G/DL (ref 11.1–15.9)
IMM GRANULOCYTES # BLD AUTO: 0.1 X10E3/UL (ref 0–0.1)
IMM GRANULOCYTES NFR BLD AUTO: 1 %
LDLC SERPL CALC-MCNC: 106 MG/DL (ref 0–99)
LYMPHOCYTES # BLD AUTO: 1.6 X10E3/UL (ref 0.7–3.1)
LYMPHOCYTES NFR BLD AUTO: 17 %
MCH RBC QN AUTO: 26.2 PG (ref 26.6–33)
MCHC RBC AUTO-ENTMCNC: 31.4 G/DL (ref 31.5–35.7)
MCV RBC AUTO: 83 FL (ref 79–97)
MONOCYTES # BLD AUTO: 0.7 X10E3/UL (ref 0.1–0.9)
MONOCYTES NFR BLD AUTO: 7 %
NEUTROPHILS # BLD AUTO: 7 X10E3/UL (ref 1.4–7)
NEUTROPHILS NFR BLD AUTO: 72 %
PLATELET # BLD AUTO: 351 X10E3/UL (ref 150–450)
POTASSIUM SERPL-SCNC: 4.7 MMOL/L (ref 3.5–5.2)
PROT SERPL-MCNC: 6.8 G/DL (ref 6–8.5)
RBC # BLD AUTO: 5.16 X10E6/UL (ref 3.77–5.28)
SODIUM SERPL-SCNC: 142 MMOL/L (ref 134–144)
TRIGL SERPL-MCNC: 46 MG/DL (ref 0–149)
VLDLC SERPL CALC-MCNC: 10 MG/DL (ref 5–40)
WBC # BLD AUTO: 9.7 X10E3/UL (ref 3.4–10.8)

## 2025-04-22 SDOH — ECONOMIC STABILITY: INCOME INSECURITY: IN THE LAST 12 MONTHS, WAS THERE A TIME WHEN YOU WERE NOT ABLE TO PAY THE MORTGAGE OR RENT ON TIME?: PATIENT DECLINED

## 2025-04-22 SDOH — ECONOMIC STABILITY: FOOD INSECURITY: WITHIN THE PAST 12 MONTHS, THE FOOD YOU BOUGHT JUST DIDN'T LAST AND YOU DIDN'T HAVE MONEY TO GET MORE.: NEVER TRUE

## 2025-04-22 SDOH — ECONOMIC STABILITY: FOOD INSECURITY: WITHIN THE PAST 12 MONTHS, YOU WORRIED THAT YOUR FOOD WOULD RUN OUT BEFORE YOU GOT MONEY TO BUY MORE.: NEVER TRUE

## 2025-04-22 SDOH — ECONOMIC STABILITY: TRANSPORTATION INSECURITY
IN THE PAST 12 MONTHS, HAS THE LACK OF TRANSPORTATION KEPT YOU FROM MEDICAL APPOINTMENTS OR FROM GETTING MEDICATIONS?: NO

## 2025-04-22 SDOH — ECONOMIC STABILITY: TRANSPORTATION INSECURITY
IN THE PAST 12 MONTHS, HAS LACK OF TRANSPORTATION KEPT YOU FROM MEETINGS, WORK, OR FROM GETTING THINGS NEEDED FOR DAILY LIVING?: NO

## 2025-04-23 ENCOUNTER — OFFICE VISIT (OUTPATIENT)
Facility: CLINIC | Age: 80
End: 2025-04-23
Payer: MEDICARE

## 2025-04-23 ENCOUNTER — RESULTS FOLLOW-UP (OUTPATIENT)
Facility: CLINIC | Age: 80
End: 2025-04-23

## 2025-04-23 VITALS
RESPIRATION RATE: 18 BRPM | DIASTOLIC BLOOD PRESSURE: 83 MMHG | BODY MASS INDEX: 25.99 KG/M2 | SYSTOLIC BLOOD PRESSURE: 139 MMHG | TEMPERATURE: 97.3 F | HEIGHT: 64 IN | OXYGEN SATURATION: 100 % | HEART RATE: 74 BPM | WEIGHT: 152.2 LBS

## 2025-04-23 DIAGNOSIS — I10 ESSENTIAL HYPERTENSION, BENIGN: Primary | ICD-10-CM

## 2025-04-23 DIAGNOSIS — E78.00 PURE HYPERCHOLESTEROLEMIA: ICD-10-CM

## 2025-04-23 DIAGNOSIS — E55.9 VITAMIN D DEFICIENCY: ICD-10-CM

## 2025-04-23 DIAGNOSIS — R73.03 PREDIABETES: ICD-10-CM

## 2025-04-23 PROCEDURE — 1126F AMNT PAIN NOTED NONE PRSNT: CPT | Performed by: NURSE PRACTITIONER

## 2025-04-23 PROCEDURE — 3075F SYST BP GE 130 - 139MM HG: CPT | Performed by: NURSE PRACTITIONER

## 2025-04-23 PROCEDURE — 99214 OFFICE O/P EST MOD 30 MIN: CPT | Performed by: NURSE PRACTITIONER

## 2025-04-23 PROCEDURE — 1159F MED LIST DOCD IN RCRD: CPT | Performed by: NURSE PRACTITIONER

## 2025-04-23 PROCEDURE — 3079F DIAST BP 80-89 MM HG: CPT | Performed by: NURSE PRACTITIONER

## 2025-04-23 PROCEDURE — 1160F RVW MEDS BY RX/DR IN RCRD: CPT | Performed by: NURSE PRACTITIONER

## 2025-04-23 PROCEDURE — 1123F ACP DISCUSS/DSCN MKR DOCD: CPT | Performed by: NURSE PRACTITIONER

## 2025-04-23 ASSESSMENT — PATIENT HEALTH QUESTIONNAIRE - PHQ9
1. LITTLE INTEREST OR PLEASURE IN DOING THINGS: NOT AT ALL
SUM OF ALL RESPONSES TO PHQ QUESTIONS 1-9: 0
2. FEELING DOWN, DEPRESSED OR HOPELESS: NOT AT ALL

## 2025-04-23 NOTE — PROGRESS NOTES
Taryn Carrillo (:  1945) is a 79 y.o. female, Established patient, presents alone, here for evaluation of the following chief complaint(s):  Cholesterol Problem, Hypertension, and Prediabetes  History obtained from patient.    Taryn was seen today for cholesterol problem, hypertension and prediabetes.    Diagnoses and all orders for this visit:    Essential hypertension, benign    Pure hypercholesterolemia    Vitamin D deficiency    Prediabetes      current treatment plan is effective, no change in therapy, orders and follow up as documented in [facility], lab results reviewed with patient  I have discussed the diagnosis with the patient and the intended plan as seen in the above orders.  The patient has received an after-visit summary and questions were answered concerning future plans.  I have discussed medication side effects and warnings with the patient as well. Patient agreeable with above plan and verbalizes understanding.    Return in about 6 months (around 10/23/2025) for AWV, fasting labs 1 week prior, in office ONLY.      Subjective   Treatment Adherence:   Medication compliance:  compliant all of the time  Diet compliance:  compliant all of the time  Weight trend: stable  Barriers: none    Hypertension:  Home blood pressure monitoring: No. Patient denies chest pain, shortness of breath, headache, lightheadedness, blurred vision, peripheral edema, palpitations, dry cough, and fatigue.  Antihypertensive medication side effects: no medication side effects noted.  Use of agents associated with hypertension: none.                                        Sodium (mmol/L)   Date Value   2025 142    BUN (mg/dL)   Date Value   2025 16    Glucose (mg/dL)   Date Value   2025 76      Potassium (mmol/L)   Date Value   2025 4.7    Creatinine (mg/dL)   Date Value   2025 0.98         Hyperlipidemia:  No new myalgias or GI upset on simvastatin (Zocor).     Lab Results   Component

## 2025-06-18 ENCOUNTER — TELEPHONE (OUTPATIENT)
Dept: PHARMACY | Facility: CLINIC | Age: 80
End: 2025-06-18

## 2025-06-18 NOTE — TELEPHONE ENCOUNTER
[facility] CLINICAL PHARMACY: ADHERENCE REVIEW  Identified care gap per [place]: fills at [facility]: Statin adherence      ASSESSMENT  STATIN ADHERENCE    Insurance Records claims through 6/10/25 (Prior Year PDC = not reported; YTD PDC = FIRST FILL; Potential Fail Date: 25):   SIMVASTATIN 10 MG  last filled on 25 for 90 day supply. Next refill due: 25    Prescribed si tablet/capsule daily    Mail order not contacted    Lab Results   Component Value Date    CHOL 167 2025    TRIG 46 2025    HDL 51 2025     Lab Results   Component Value Date     (H) 2025      ALT   Date Value Ref Range Status   2025 11 0 - 32 IU/L Final     AST   Date Value Ref Range Status   2025 18 0 - 40 IU/L Final     The 10-year ASCVD risk score (Lore WARNER, et al., 2019) is: 15.4%    Values used to calculate the score:      Age: 79 years      Sex: Female      Is Non- : Yes      Diabetic: No      Tobacco smoker: No      Systolic Blood Pressure: 139 mmHg      Is BP treated: Yes      HDL Cholesterol: 51 mg/dL      Total Cholesterol: 167 mg/dL       The following are interventions that have been identified:   Patient OVERDUE refilling SIMVASTATIN 10 MG  and active on home medication list.     Attempting to reach patient to review.  Left message asking for return call. [employer] message sent to patient.      Last Visit: 25  Next Visit: 10/24/25    [place] please route to [place] PharmD       Clotilde Christensen CPhT.   [facility] Clinical   [facility]  Toll free: [phone] Option 1

## 2025-06-19 NOTE — TELEPHONE ENCOUNTER
Patient returned call.    Patient reports she received a call in January from [facility] regarding Simvastatin refills.    Reports taking every daily without barriers to adherence, but states she had switched from [employer] to [facility], so she had an overstock on these medications.    Patient requesting this [facility] call her mailorder pharmacy to request refills for Metoprolol, Amlodipine and Simvastatin.  Spoke to [place] with [facility] and confirmed all 3 Rx's will be filled today and delivered to patient in 5-7 business days.  Rep states patient is not on autorefill, so if patient prefers this she should call them to set up - otherwise she will need to call them to request refills.      NOTE: Patient reports PCP Brooke Cintron is leaving [facility], so patient has an appointment with new PCP office tomorrow at [facility]. This office does not appear to be a [facility] site.  Discussed with patient that once she has met with new PCP tomorrow and has provider's name and facility info to call her insurance to request they update the PCP on file to ensure her new provider's office receives important communications from her insurance.  Patient agreed and verbalized understanding.      Radha Kwon, PharmD., [facility]  Population Health Pharmacist  [facility] Clinical Pharmacy  Department, toll free: [phone], option 1  =======================================================  For Pharmacy Admin Tracking Only    Program: [facility] [employer]  CPA in place:  No  Recommendation Provided To: Patient/Caregiver: 3 via Telephone and Pharmacy: 3  Intervention Detail: Adherence Monitoring: 3  Intervention Accepted By: Patient/Caregiver: 3 and Pharmacy: 3  Gap Closed?: Yes   Time Spent (min): 30

## 2025-07-23 ENCOUNTER — HOSPITAL ENCOUNTER (OUTPATIENT)
Facility: HOSPITAL | Age: 80
Setting detail: SPECIMEN
Discharge: HOME | End: 2025-07-26

## 2025-07-23 LAB — LABCORP SPECIMEN COLLECTION: NORMAL

## 2025-07-23 PROCEDURE — 99001 SPECIMEN HANDLING PT-LAB: CPT

## 2025-07-25 ENCOUNTER — TRANSCRIBE ORDERS (OUTPATIENT)
Facility: HOSPITAL | Age: 80
End: 2025-07-25

## 2025-07-25 DIAGNOSIS — R79.89 ELEVATED BRAIN NATRIURETIC PEPTIDE (BNP) LEVEL: Primary | ICD-10-CM

## 2025-08-20 ENCOUNTER — TELEPHONE (OUTPATIENT)
Facility: CLINIC | Age: 80
End: 2025-08-20

## 2025-08-25 ENCOUNTER — TRANSCRIBE ORDERS (OUTPATIENT)
Facility: HOSPITAL | Age: 80
End: 2025-08-25

## 2025-08-25 DIAGNOSIS — Z12.31 ENCOUNTER FOR SCREENING MAMMOGRAM FOR MALIGNANT NEOPLASM OF BREAST: Primary | ICD-10-CM

## 2025-08-27 ENCOUNTER — HOSPITAL ENCOUNTER (OUTPATIENT)
Age: 80
Discharge: HOME | End: 2025-08-30
Payer: MEDICARE

## 2025-08-27 DIAGNOSIS — M25.562 POSTERIOR LEFT KNEE PAIN: ICD-10-CM

## 2025-08-27 PROCEDURE — 73562 X-RAY EXAM OF KNEE 3: CPT

## 2025-08-29 ENCOUNTER — HOSPITAL ENCOUNTER (OUTPATIENT)
Age: 80
Discharge: HOME | End: 2025-08-31
Payer: MEDICARE

## 2025-08-29 DIAGNOSIS — M79.605 LEFT LEG PAIN: ICD-10-CM

## 2025-08-29 PROCEDURE — 93971 EXTREMITY STUDY: CPT

## 2025-09-01 PROCEDURE — 93971 EXTREMITY STUDY: CPT | Performed by: SURGERY
